# Patient Record
Sex: FEMALE | Race: BLACK OR AFRICAN AMERICAN | Employment: OTHER | URBAN - METROPOLITAN AREA
[De-identification: names, ages, dates, MRNs, and addresses within clinical notes are randomized per-mention and may not be internally consistent; named-entity substitution may affect disease eponyms.]

---

## 2017-11-15 ENCOUNTER — HOSPITAL ENCOUNTER (OUTPATIENT)
Dept: MAMMOGRAPHY | Age: 67
Discharge: HOME | End: 2017-11-15
Attending: FAMILY MEDICINE
Payer: COMMERCIAL

## 2017-11-15 PROCEDURE — 77067 SCR MAMMO BI INCL CAD: CPT

## 2017-11-17 ENCOUNTER — HOSPITAL ENCOUNTER (OUTPATIENT)
Dept: MAMMOGRAPHY | Age: 67
Discharge: HOME | End: 2017-11-17
Attending: FAMILY MEDICINE
Payer: COMMERCIAL

## 2017-11-17 DIAGNOSIS — N63.10 BREAST MASS, RIGHT: ICD-10-CM

## 2017-11-17 PROCEDURE — 76642 ULTRASOUND BREAST LIMITED: CPT

## 2017-11-20 NOTE — PROGRESS NOTES
sched MRI bilateral with contrast : 1. 2.4 cm x 1 cm x 1.8 cm hypoechoic abnormality at the level of a prior scar  which may represent scar tissue although should be further characterized given  that this does appear to have demonstrated evolution by mammography. This would  be best performed with a contrasted bilateral breast MRI. I am going to try to order; might need PA? ??

## 2017-11-28 ENCOUNTER — HOSPITAL ENCOUNTER (OUTPATIENT)
Dept: MRI IMAGING | Age: 67
Discharge: HOME | End: 2017-11-28
Attending: FAMILY MEDICINE
Payer: COMMERCIAL

## 2017-11-28 VITALS — WEIGHT: 163 LBS | BODY MASS INDEX: 30.8 KG/M2

## 2017-11-28 DIAGNOSIS — R92.8 ABNORMAL ULTRASOUND OF BREAST: ICD-10-CM

## 2017-11-28 DIAGNOSIS — N63.10 BREAST MASS, RIGHT: ICD-10-CM

## 2017-11-28 DIAGNOSIS — Z85.3 PERSONAL HISTORY OF BREAST CANCER: ICD-10-CM

## 2017-11-28 LAB — CREAT BLD-MCNC: 1.2 MG/DL (ref 0.8–1.5)

## 2017-11-28 PROCEDURE — 82565 ASSAY OF CREATININE: CPT

## 2017-11-28 PROCEDURE — 74011250636 HC RX REV CODE- 250/636: Performed by: FAMILY MEDICINE

## 2017-11-28 PROCEDURE — A9577 INJ MULTIHANCE: HCPCS | Performed by: FAMILY MEDICINE

## 2017-11-28 PROCEDURE — 77059 MRI BREAST BI W WO CONT: CPT

## 2017-11-28 PROCEDURE — 74011000258 HC RX REV CODE- 258: Performed by: FAMILY MEDICINE

## 2017-11-28 RX ORDER — SODIUM CHLORIDE 0.9 % (FLUSH) 0.9 %
10 SYRINGE (ML) INJECTION
Status: COMPLETED | OUTPATIENT
Start: 2017-11-28 | End: 2017-11-28

## 2017-11-28 RX ADMIN — GADOBENATE DIMEGLUMINE 15 ML: 529 INJECTION, SOLUTION INTRAVENOUS at 10:07

## 2017-11-28 RX ADMIN — SODIUM CHLORIDE 100 ML: 900 INJECTION, SOLUTION INTRAVENOUS at 10:07

## 2017-11-28 RX ADMIN — Medication 10 ML: at 10:07

## 2017-12-05 ENCOUNTER — HOSPITAL ENCOUNTER (OUTPATIENT)
Dept: MAMMOGRAPHY | Age: 67
Discharge: HOME | End: 2017-12-05
Attending: FAMILY MEDICINE
Payer: COMMERCIAL

## 2017-12-05 VITALS — SYSTOLIC BLOOD PRESSURE: 110 MMHG | DIASTOLIC BLOOD PRESSURE: 73 MMHG | TEMPERATURE: 97.2 F | HEART RATE: 94 BPM

## 2017-12-05 DIAGNOSIS — R92.2 BREAST DENSITY: ICD-10-CM

## 2017-12-05 DIAGNOSIS — R92.8 OTHER ABNORMAL AND INCONCLUSIVE FINDINGS ON DIAGNOSTIC IMAGING OF BREAST: ICD-10-CM

## 2017-12-05 DIAGNOSIS — N63.10 BREAST MASS, RIGHT: ICD-10-CM

## 2017-12-05 DIAGNOSIS — Z85.3 HISTORY OF BREAST CANCER: ICD-10-CM

## 2017-12-05 PROCEDURE — 77065 DX MAMMO INCL CAD UNI: CPT

## 2017-12-05 PROCEDURE — 19083 BX BREAST 1ST LESION US IMAG: CPT

## 2017-12-05 PROCEDURE — 74011000250 HC RX REV CODE- 250: Performed by: FAMILY MEDICINE

## 2017-12-05 PROCEDURE — 88305 TISSUE EXAM BY PATHOLOGIST: CPT | Performed by: FAMILY MEDICINE

## 2017-12-05 RX ORDER — LIDOCAINE HYDROCHLORIDE 10 MG/ML
8 INJECTION INFILTRATION; PERINEURAL
Status: COMPLETED | OUTPATIENT
Start: 2017-12-05 | End: 2017-12-05

## 2017-12-05 RX ADMIN — LIDOCAINE HYDROCHLORIDE 8 ML: 10 INJECTION, SOLUTION INFILTRATION; PERINEURAL at 09:36

## 2017-12-06 NOTE — PROGRESS NOTES
I gave the patient results, and informally discussed over the phone with Dr. Sandra Barcenas, please schedule an appointment within the next 1-2 weeks with Dr. Won Cruz at the breast center for breast cancer local recurrence considering surgical options

## 2017-12-06 NOTE — PROGRESS NOTES
I spoke with Aman Walsh @ Dr Ean Miranda office regarding the results of Ms Gi Deshpande breast U/S bx. Pathology: Rt IDC with lobular features. Dr Ean Miranda gave her the results and she will not come in for results.  She has decided to go to a surgeon @ [facility] and will be followed up by Dr Ean Miranda

## 2018-02-08 RX ORDER — CEFAZOLIN SODIUM IN 0.9 % NACL 2 G/50 ML
2 INTRAVENOUS SOLUTION, PIGGYBACK (ML) INTRAVENOUS ONCE
Status: CANCELLED | OUTPATIENT
Start: 2018-02-14 | End: 2018-02-14

## 2018-02-15 ENCOUNTER — HOSPITAL ENCOUNTER (OUTPATIENT)
Dept: SURGERY | Age: 68
Discharge: HOME | End: 2018-02-15
Payer: COMMERCIAL

## 2018-02-15 VITALS
DIASTOLIC BLOOD PRESSURE: 82 MMHG | HEIGHT: 62 IN | SYSTOLIC BLOOD PRESSURE: 149 MMHG | HEART RATE: 72 BPM | WEIGHT: 167.19 LBS | BODY MASS INDEX: 30.77 KG/M2 | TEMPERATURE: 97.8 F | RESPIRATION RATE: 20 BRPM | OXYGEN SATURATION: 100 %

## 2018-02-15 LAB
ANION GAP SERPL CALC-SCNC: 5 MMOL/L (ref 7–16)
APPEARANCE UR: ABNORMAL
APTT PPP: 29.5 SEC (ref 23.2–35.3)
ATRIAL RATE: 70 BPM
BACTERIA URNS QL MICRO: ABNORMAL /HPF
BILIRUB UR QL: NEGATIVE
BUN SERPL-MCNC: 11 MG/DL (ref 8–23)
CALCIUM SERPL-MCNC: 10 MG/DL (ref 8.3–10.4)
CALCULATED P AXIS, ECG09: 67 DEGREES
CALCULATED R AXIS, ECG10: 52 DEGREES
CALCULATED T AXIS, ECG11: 32 DEGREES
CASTS URNS QL MICRO: ABNORMAL /LPF
CHLORIDE SERPL-SCNC: 107 MMOL/L (ref 98–107)
CO2 SERPL-SCNC: 30 MMOL/L (ref 21–32)
COLOR UR: YELLOW
CREAT SERPL-MCNC: 1 MG/DL (ref 0.6–1)
DIAGNOSIS, 93000: NORMAL
EPI CELLS #/AREA URNS HPF: ABNORMAL /HPF
ERYTHROCYTE [DISTWIDTH] IN BLOOD BY AUTOMATED COUNT: 15.3 % (ref 11.9–14.6)
GLUCOSE BLD STRIP.AUTO-MCNC: 84 MG/DL (ref 65–100)
GLUCOSE SERPL-MCNC: 83 MG/DL (ref 65–100)
GLUCOSE UR STRIP.AUTO-MCNC: NEGATIVE MG/DL
HCT VFR BLD AUTO: 37.7 % (ref 35.8–46.3)
HGB BLD-MCNC: 12 G/DL (ref 11.7–15.4)
HGB UR QL STRIP: ABNORMAL
INR PPP: 1
KETONES UR QL STRIP.AUTO: NEGATIVE MG/DL
LEUKOCYTE ESTERASE UR QL STRIP.AUTO: NEGATIVE
MCH RBC QN AUTO: 24.7 PG (ref 26.1–32.9)
MCHC RBC AUTO-ENTMCNC: 31.8 G/DL (ref 31.4–35)
MCV RBC AUTO: 77.7 FL (ref 79.6–97.8)
NITRITE UR QL STRIP.AUTO: POSITIVE
P-R INTERVAL, ECG05: 138 MS
PH UR STRIP: 5.5 [PH] (ref 5–9)
PLATELET # BLD AUTO: 265 K/UL (ref 150–450)
PMV BLD AUTO: 9.2 FL (ref 10.8–14.1)
POTASSIUM SERPL-SCNC: 4 MMOL/L (ref 3.5–5.1)
PROT UR STRIP-MCNC: NEGATIVE MG/DL
PROTHROMBIN TIME: 13.2 SEC (ref 11.5–14.5)
Q-T INTERVAL, ECG07: 376 MS
QRS DURATION, ECG06: 78 MS
QTC CALCULATION (BEZET), ECG08: 406 MS
RBC # BLD AUTO: 4.85 M/UL (ref 4.05–5.25)
RBC #/AREA URNS HPF: ABNORMAL /HPF
SODIUM SERPL-SCNC: 142 MMOL/L (ref 136–145)
SP GR UR REFRACTOMETRY: 1.01 (ref 1–1.02)
UROBILINOGEN UR QL STRIP.AUTO: 0.2 EU/DL (ref 0.2–1)
VENTRICULAR RATE, ECG03: 70 BPM
WBC # BLD AUTO: 6.2 K/UL (ref 4.3–11.1)
WBC URNS QL MICRO: ABNORMAL /HPF

## 2018-02-15 PROCEDURE — 80048 BASIC METABOLIC PNL TOTAL CA: CPT | Performed by: SURGERY

## 2018-02-15 PROCEDURE — 85610 PROTHROMBIN TIME: CPT | Performed by: SURGERY

## 2018-02-15 PROCEDURE — 85027 COMPLETE CBC AUTOMATED: CPT | Performed by: SURGERY

## 2018-02-15 PROCEDURE — 93005 ELECTROCARDIOGRAM TRACING: CPT | Performed by: ANESTHESIOLOGY

## 2018-02-15 PROCEDURE — 81001 URINALYSIS AUTO W/SCOPE: CPT | Performed by: SURGERY

## 2018-02-15 PROCEDURE — 82962 GLUCOSE BLOOD TEST: CPT

## 2018-02-15 PROCEDURE — 85730 THROMBOPLASTIN TIME PARTIAL: CPT | Performed by: SURGERY

## 2018-02-15 NOTE — PERIOP NOTES
POC glucose 84 . Instructed  Patient that if blood sugar 300 or > , surgery may be cancelled. Patient verified name, , and surgery as listed in [facility]. Patient provided medical/health information and PTA medications to the best of their ability. TYPE  CASE:3  Orders per surgeon: Received  and dated 18. Labs per surgeon:cbc, bmp, pt/ptt, urine. Results: pending  Labs per anesthesia protocol: type and screen on the dos. EKG  :  Today per protocol. To desk for review along with old ekg  Per Dr. Nereyda Norwood pt did not need seen per anesthesia today. Patient provided with and instructed on education handouts including Guide to Surgery, blood transfusions, pain management, and hand hygiene for the family and community, and [place] [id number] [facility] brochure.     hibiclens and instructions given per hospital policy. Instructed patient to continue previous medications as prescribed prior to surgery unless otherwise directed and to take the following medications the day of surgery according to anesthesia guidelines : amlodipine, flonase if needed, bystolic, effexor . Instructed patient to hold  the following medications: aspirin, calcium chew. Original medication prescription bottles  not visualized during patient appointment. Patient teach back successful and patient demonstrates knowledge of instruction.

## 2018-02-15 NOTE — PERIOP NOTES
Recent Results (from the past 12 hour(s))   CBC W/O DIFF    Collection Time: 02/15/18 10:59 AM   Result Value Ref Range    WBC 6.2 4.3 - 11.1 K/uL    RBC 4.85 4.05 - 5.25 M/uL    HGB 12.0 11.7 - 15.4 g/dL    HCT 37.7 35.8 - 46.3 %    MCV 77.7 (L) 79.6 - 97.8 FL    MCH 24.7 (L) 26.1 - 32.9 PG    MCHC 31.8 31.4 - 35.0 g/dL    RDW 15.3 (H) 11.9 - 14.6 %    PLATELET 136 440 - 429 K/uL    MPV 9.2 (L) 10.8 - 84.0 FL   METABOLIC PANEL, BASIC    Collection Time: 02/15/18 10:59 AM   Result Value Ref Range    Sodium 142 136 - 145 mmol/L    Potassium 4.0 3.5 - 5.1 mmol/L    Chloride 107 98 - 107 mmol/L    CO2 30 21 - 32 mmol/L    Anion gap 5 (L) 7 - 16 mmol/L    Glucose 83 65 - 100 mg/dL    BUN 11 8 - 23 MG/DL    Creatinine 1.00 0.6 - 1.0 MG/DL    GFR est AA >60 >60 ml/min/1.73m2    GFR est non-AA 59 (L) >60 ml/min/1.73m2    Calcium 10.0 8.3 - 10.4 MG/DL   PROTHROMBIN TIME + INR    Collection Time: 02/15/18 10:59 AM   Result Value Ref Range    Prothrombin time 13.2 11.5 - 14.5 sec    INR 1.0     PTT    Collection Time: 02/15/18 10:59 AM   Result Value Ref Range    aPTT 29.5 23.2 - 35.3 SEC   URINALYSIS W/ RFLX MICROSCOPIC    Collection Time: 02/15/18 10:59 AM   Result Value Ref Range    Color YELLOW      Appearance CLOUDY      Specific gravity 1.013 1.001 - 1.023      pH (UA) 5.5 5.0 - 9.0      Protein NEGATIVE  NEG mg/dL    Glucose NEGATIVE  mg/dL    Ketone NEGATIVE  NEG mg/dL    Bilirubin NEGATIVE  NEG      Blood TRACE (A) NEG      Urobilinogen 0.2 0.2 - 1.0 EU/dL    Nitrites POSITIVE (A) NEG      Leukocyte Esterase NEGATIVE  NEG      WBC 3-5 0 /hpf    RBC 0-3 0 /hpf    Epithelial cells 0-3 0 /hpf    Bacteria 4+ (H) 0 /hpf    Casts 0-3 0 /lpf   GLUCOSE, POC    Collection Time: 02/15/18 11:14 AM   Result Value Ref Range    Glucose (POC) 84 65 - 100 mg/dL

## 2018-02-15 NOTE — PERIOP NOTES
Notified Jaswant Lott at office that pt has + nitrates and 4+ bacteria in urine, she states she will notify Dr. Kelsey Stanley.

## 2018-02-21 ENCOUNTER — ANESTHESIA (OUTPATIENT)
Dept: SURGERY | Age: 68
DRG: 581 | End: 2018-02-21
Payer: COMMERCIAL

## 2018-02-21 ENCOUNTER — APPOINTMENT (OUTPATIENT)
Dept: NUCLEAR MEDICINE | Age: 68
DRG: 581 | End: 2018-02-21
Attending: SURGERY
Payer: COMMERCIAL

## 2018-02-21 ENCOUNTER — ANESTHESIA EVENT (OUTPATIENT)
Dept: SURGERY | Age: 68
DRG: 581 | End: 2018-02-21
Payer: COMMERCIAL

## 2018-02-21 ENCOUNTER — HOSPITAL ENCOUNTER (INPATIENT)
Age: 68
LOS: 2 days | Discharge: HOME | DRG: 581 | End: 2018-02-23
Attending: SURGERY | Admitting: SURGERY
Payer: COMMERCIAL

## 2018-02-21 DIAGNOSIS — C50.911 MALIGNANT NEOPLASM OF RIGHT FEMALE BREAST, UNSPECIFIED ESTROGEN RECEPTOR STATUS, UNSPECIFIED SITE OF BREAST (HCC): ICD-10-CM

## 2018-02-21 DIAGNOSIS — G89.18 POST-OP PAIN: Primary | ICD-10-CM

## 2018-02-21 PROBLEM — C50.919 BREAST CANCER IN FEMALE (HCC): Status: ACTIVE | Noted: 2018-02-21

## 2018-02-21 LAB
ABO + RH BLD: NORMAL
BLOOD GROUP ANTIBODIES SERPL: NORMAL
GLUCOSE BLD STRIP.AUTO-MCNC: 91 MG/DL (ref 65–100)
SPECIMEN EXP DATE BLD: NORMAL

## 2018-02-21 PROCEDURE — 74011250636 HC RX REV CODE- 250/636: Performed by: ANESTHESIOLOGY

## 2018-02-21 PROCEDURE — 74011250636 HC RX REV CODE- 250/636

## 2018-02-21 PROCEDURE — 88307 TISSUE EXAM BY PATHOLOGIST: CPT | Performed by: SURGERY

## 2018-02-21 PROCEDURE — 77030012406 HC DRN WND PENRS BARD -A: Performed by: SURGERY

## 2018-02-21 PROCEDURE — 0KBH0ZZ EXCISION OF RIGHT THORAX MUSCLE, OPEN APPROACH: ICD-10-PCS | Performed by: SURGERY

## 2018-02-21 PROCEDURE — A9541 TC99M SULFUR COLLOID: HCPCS

## 2018-02-21 PROCEDURE — 77030002996 HC SUT SLK J&J -A: Performed by: SURGERY

## 2018-02-21 PROCEDURE — 0KXF0Z5 TRANSFER RIGHT TRUNK MUSCLE, LATISSIMUS DORSI MYOCUTANEOUS FLAP, OPEN APPROACH: ICD-10-PCS | Performed by: SURGERY

## 2018-02-21 PROCEDURE — 74011250636 HC RX REV CODE- 250/636: Performed by: SURGERY

## 2018-02-21 PROCEDURE — 76210000006 HC OR PH I REC 0.5 TO 1 HR: Performed by: SURGERY

## 2018-02-21 PROCEDURE — 0HHT0NZ INSERTION OF TISSUE EXPANDER INTO RIGHT BREAST, OPEN APPROACH: ICD-10-PCS | Performed by: SURGERY

## 2018-02-21 PROCEDURE — 76060000042 HC ANESTHESIA 5.5 TO 6 HR: Performed by: SURGERY

## 2018-02-21 PROCEDURE — 74011000250 HC RX REV CODE- 250

## 2018-02-21 PROCEDURE — 65270000029 HC RM PRIVATE

## 2018-02-21 PROCEDURE — C1789 PROSTHESIS, BREAST, IMP: HCPCS | Performed by: SURGERY

## 2018-02-21 PROCEDURE — 82962 GLUCOSE BLOOD TEST: CPT

## 2018-02-21 PROCEDURE — 77030008477 HC STYL SATN SLP COVD -A: Performed by: NURSE ANESTHETIST, CERTIFIED REGISTERED

## 2018-02-21 PROCEDURE — 77030011283 HC ELECTRD NDL COVD -A: Performed by: SURGERY

## 2018-02-21 PROCEDURE — 77030031139 HC SUT VCRL2 J&J -A: Performed by: SURGERY

## 2018-02-21 PROCEDURE — 77030011640 HC PAD GRND REM COVD -A: Performed by: SURGERY

## 2018-02-21 PROCEDURE — 07B50ZX EXCISION OF RIGHT AXILLARY LYMPHATIC, OPEN APPROACH, DIAGNOSTIC: ICD-10-PCS | Performed by: SURGERY

## 2018-02-21 PROCEDURE — 77030027138 HC INCENT SPIROMETER -A

## 2018-02-21 PROCEDURE — 0HTT0ZZ RESECTION OF RIGHT BREAST, OPEN APPROACH: ICD-10-PCS | Performed by: SURGERY

## 2018-02-21 PROCEDURE — 77030032490 HC SLV COMPR SCD KNE COVD -B: Performed by: SURGERY

## 2018-02-21 PROCEDURE — 77030013674 HC FIL EXPND TISS ALGN -A: Performed by: SURGERY

## 2018-02-21 PROCEDURE — 77030033138 HC SUT PGA STRATFX J&J -B: Performed by: SURGERY

## 2018-02-21 PROCEDURE — 77030016570 HC BLNKT BAIR HGGR 3M -B: Performed by: NURSE ANESTHETIST, CERTIFIED REGISTERED

## 2018-02-21 PROCEDURE — 86900 BLOOD TYPING SEROLOGIC ABO: CPT | Performed by: ANESTHESIOLOGY

## 2018-02-21 PROCEDURE — 77030008703 HC TU ET UNCUF COVD -A: Performed by: NURSE ANESTHETIST, CERTIFIED REGISTERED

## 2018-02-21 PROCEDURE — 76010000178 HC OR TIME 5.5 TO 6 HR INTENSV-TIER 1: Performed by: SURGERY

## 2018-02-21 PROCEDURE — 74011000250 HC RX REV CODE- 250: Performed by: SURGERY

## 2018-02-21 PROCEDURE — 74011250637 HC RX REV CODE- 250/637: Performed by: SURGERY

## 2018-02-21 PROCEDURE — 77030019908 HC STETH ESOPH SIMS -A: Performed by: NURSE ANESTHETIST, CERTIFIED REGISTERED

## 2018-02-21 PROCEDURE — 77030008467 HC STPLR SKN COVD -B: Performed by: SURGERY

## 2018-02-21 PROCEDURE — 99218 HC RM OBSERVATION: CPT

## 2018-02-21 PROCEDURE — 88305 TISSUE EXAM BY PATHOLOGIST: CPT | Performed by: SURGERY

## 2018-02-21 PROCEDURE — 77030018836 HC SOL IRR NACL ICUM -A: Performed by: SURGERY

## 2018-02-21 PROCEDURE — 77030013567 HC DRN WND RESERV BARD -A: Performed by: SURGERY

## 2018-02-21 DEVICE — TEXTURED, HIGH PROFILE, SUTURE TABS, INTEGRAL INJECTION DOME, 475CC
Type: IMPLANTABLE DEVICE | Site: BREAST | Status: FUNCTIONAL
Brand: ARTOURA BREAST TISSUE EXPANDER

## 2018-02-21 RX ORDER — GLYCOPYRROLATE 0.2 MG/ML
INJECTION INTRAMUSCULAR; INTRAVENOUS AS NEEDED
Status: DISCONTINUED | OUTPATIENT
Start: 2018-02-21 | End: 2018-02-21 | Stop reason: HOSPADM

## 2018-02-21 RX ORDER — SODIUM CHLORIDE 0.9 % (FLUSH) 0.9 %
5-10 SYRINGE (ML) INJECTION EVERY 8 HOURS
Status: DISCONTINUED | OUTPATIENT
Start: 2018-02-21 | End: 2018-02-23 | Stop reason: HOSPADM

## 2018-02-21 RX ORDER — ROCURONIUM BROMIDE 10 MG/ML
INJECTION, SOLUTION INTRAVENOUS AS NEEDED
Status: DISCONTINUED | OUTPATIENT
Start: 2018-02-21 | End: 2018-02-21 | Stop reason: HOSPADM

## 2018-02-21 RX ORDER — ACETAMINOPHEN 325 MG/1
650 TABLET ORAL
Status: DISCONTINUED | OUTPATIENT
Start: 2018-02-21 | End: 2018-02-23 | Stop reason: HOSPADM

## 2018-02-21 RX ORDER — EPHEDRINE SULFATE 50 MG/ML
INJECTION, SOLUTION INTRAVENOUS AS NEEDED
Status: DISCONTINUED | OUTPATIENT
Start: 2018-02-21 | End: 2018-02-21 | Stop reason: HOSPADM

## 2018-02-21 RX ORDER — BUPIVACAINE HYDROCHLORIDE 5 MG/ML
INJECTION, SOLUTION EPIDURAL; INTRACAUDAL AS NEEDED
Status: DISCONTINUED | OUTPATIENT
Start: 2018-02-21 | End: 2018-02-21 | Stop reason: HOSPADM

## 2018-02-21 RX ORDER — LIDOCAINE HYDROCHLORIDE 20 MG/ML
INJECTION, SOLUTION EPIDURAL; INFILTRATION; INTRACAUDAL; PERINEURAL AS NEEDED
Status: DISCONTINUED | OUTPATIENT
Start: 2018-02-21 | End: 2018-02-21 | Stop reason: HOSPADM

## 2018-02-21 RX ORDER — ONDANSETRON 2 MG/ML
INJECTION INTRAMUSCULAR; INTRAVENOUS AS NEEDED
Status: DISCONTINUED | OUTPATIENT
Start: 2018-02-21 | End: 2018-02-21 | Stop reason: HOSPADM

## 2018-02-21 RX ORDER — SODIUM CHLORIDE 0.9 % (FLUSH) 0.9 %
5-10 SYRINGE (ML) INJECTION AS NEEDED
Status: DISCONTINUED | OUTPATIENT
Start: 2018-02-21 | End: 2018-02-21 | Stop reason: HOSPADM

## 2018-02-21 RX ORDER — CEFAZOLIN SODIUM 1 G/3ML
INJECTION, POWDER, FOR SOLUTION INTRAMUSCULAR; INTRAVENOUS AS NEEDED
Status: DISCONTINUED | OUTPATIENT
Start: 2018-02-21 | End: 2018-02-21 | Stop reason: HOSPADM

## 2018-02-21 RX ORDER — OXYCODONE HYDROCHLORIDE 5 MG/1
5 TABLET ORAL
Status: DISCONTINUED | OUTPATIENT
Start: 2018-02-21 | End: 2018-02-21 | Stop reason: HOSPADM

## 2018-02-21 RX ORDER — SODIUM CHLORIDE 0.9 % (FLUSH) 0.9 %
5-10 SYRINGE (ML) INJECTION AS NEEDED
Status: DISCONTINUED | OUTPATIENT
Start: 2018-02-21 | End: 2018-02-23 | Stop reason: HOSPADM

## 2018-02-21 RX ORDER — AMLODIPINE BESYLATE 10 MG/1
10 TABLET ORAL DAILY
Status: DISCONTINUED | OUTPATIENT
Start: 2018-02-22 | End: 2018-02-23 | Stop reason: HOSPADM

## 2018-02-21 RX ORDER — DIPHENHYDRAMINE HCL 25 MG
25 CAPSULE ORAL
Status: DISCONTINUED | OUTPATIENT
Start: 2018-02-21 | End: 2018-02-23 | Stop reason: HOSPADM

## 2018-02-21 RX ORDER — VENLAFAXINE HYDROCHLORIDE 37.5 MG/1
37.5 CAPSULE, EXTENDED RELEASE ORAL
Status: DISCONTINUED | OUTPATIENT
Start: 2018-02-22 | End: 2018-02-23 | Stop reason: HOSPADM

## 2018-02-21 RX ORDER — DOCUSATE SODIUM 100 MG/1
100 CAPSULE, LIQUID FILLED ORAL 2 TIMES DAILY
Status: DISCONTINUED | OUTPATIENT
Start: 2018-02-21 | End: 2018-02-23 | Stop reason: HOSPADM

## 2018-02-21 RX ORDER — SUCCINYLCHOLINE CHLORIDE 20 MG/ML
INJECTION INTRAMUSCULAR; INTRAVENOUS AS NEEDED
Status: DISCONTINUED | OUTPATIENT
Start: 2018-02-21 | End: 2018-02-21 | Stop reason: HOSPADM

## 2018-02-21 RX ORDER — CEFAZOLIN SODIUM IN 0.9 % NACL 2 G/50 ML
2 INTRAVENOUS SOLUTION, PIGGYBACK (ML) INTRAVENOUS ONCE
Status: DISCONTINUED | OUTPATIENT
Start: 2018-02-21 | End: 2018-02-21 | Stop reason: SDUPTHER

## 2018-02-21 RX ORDER — FENTANYL CITRATE 50 UG/ML
INJECTION, SOLUTION INTRAMUSCULAR; INTRAVENOUS AS NEEDED
Status: DISCONTINUED | OUTPATIENT
Start: 2018-02-21 | End: 2018-02-21 | Stop reason: HOSPADM

## 2018-02-21 RX ORDER — NEOSTIGMINE METHYLSULFATE 1 MG/ML
INJECTION INTRAVENOUS AS NEEDED
Status: DISCONTINUED | OUTPATIENT
Start: 2018-02-21 | End: 2018-02-21 | Stop reason: HOSPADM

## 2018-02-21 RX ORDER — PROPOFOL 10 MG/ML
INJECTION, EMULSION INTRAVENOUS AS NEEDED
Status: DISCONTINUED | OUTPATIENT
Start: 2018-02-21 | End: 2018-02-21 | Stop reason: HOSPADM

## 2018-02-21 RX ORDER — OXYCODONE AND ACETAMINOPHEN 5; 325 MG/1; MG/1
1 TABLET ORAL
Status: DISCONTINUED | OUTPATIENT
Start: 2018-02-21 | End: 2018-02-23 | Stop reason: HOSPADM

## 2018-02-21 RX ORDER — DEXAMETHASONE SODIUM PHOSPHATE 4 MG/ML
INJECTION, SOLUTION INTRA-ARTICULAR; INTRALESIONAL; INTRAMUSCULAR; INTRAVENOUS; SOFT TISSUE AS NEEDED
Status: DISCONTINUED | OUTPATIENT
Start: 2018-02-21 | End: 2018-02-21 | Stop reason: HOSPADM

## 2018-02-21 RX ORDER — ONDANSETRON 8 MG/1
4 TABLET, ORALLY DISINTEGRATING ORAL
Status: DISCONTINUED | OUTPATIENT
Start: 2018-02-21 | End: 2018-02-23 | Stop reason: HOSPADM

## 2018-02-21 RX ORDER — OXYCODONE AND ACETAMINOPHEN 10; 325 MG/1; MG/1
1 TABLET ORAL AS NEEDED
Status: DISCONTINUED | OUTPATIENT
Start: 2018-02-21 | End: 2018-02-21 | Stop reason: HOSPADM

## 2018-02-21 RX ORDER — HYDROMORPHONE HYDROCHLORIDE 2 MG/ML
0.5 INJECTION, SOLUTION INTRAMUSCULAR; INTRAVENOUS; SUBCUTANEOUS
Status: DISCONTINUED | OUTPATIENT
Start: 2018-02-21 | End: 2018-02-23 | Stop reason: HOSPADM

## 2018-02-21 RX ORDER — MIDAZOLAM HYDROCHLORIDE 1 MG/ML
2 INJECTION, SOLUTION INTRAMUSCULAR; INTRAVENOUS
Status: DISCONTINUED | OUTPATIENT
Start: 2018-02-21 | End: 2018-02-21 | Stop reason: HOSPADM

## 2018-02-21 RX ORDER — SODIUM CHLORIDE, SODIUM LACTATE, POTASSIUM CHLORIDE, CALCIUM CHLORIDE 600; 310; 30; 20 MG/100ML; MG/100ML; MG/100ML; MG/100ML
75 INJECTION, SOLUTION INTRAVENOUS CONTINUOUS
Status: DISCONTINUED | OUTPATIENT
Start: 2018-02-21 | End: 2018-02-21 | Stop reason: HOSPADM

## 2018-02-21 RX ORDER — CEFAZOLIN SODIUM/WATER 2 G/20 ML
2 SYRINGE (ML) INTRAVENOUS ONCE
Status: COMPLETED | OUTPATIENT
Start: 2018-02-21 | End: 2018-02-21

## 2018-02-21 RX ORDER — ACETAMINOPHEN 10 MG/ML
INJECTION, SOLUTION INTRAVENOUS AS NEEDED
Status: DISCONTINUED | OUTPATIENT
Start: 2018-02-21 | End: 2018-02-21 | Stop reason: HOSPADM

## 2018-02-21 RX ORDER — HYDROMORPHONE HYDROCHLORIDE 2 MG/ML
0.5 INJECTION, SOLUTION INTRAMUSCULAR; INTRAVENOUS; SUBCUTANEOUS
Status: DISCONTINUED | OUTPATIENT
Start: 2018-02-21 | End: 2018-02-21 | Stop reason: HOSPADM

## 2018-02-21 RX ORDER — LIDOCAINE HYDROCHLORIDE AND EPINEPHRINE 10; 10 MG/ML; UG/ML
INJECTION, SOLUTION INFILTRATION; PERINEURAL AS NEEDED
Status: DISCONTINUED | OUTPATIENT
Start: 2018-02-21 | End: 2018-02-21 | Stop reason: HOSPADM

## 2018-02-21 RX ADMIN — EPHEDRINE SULFATE 5 MG: 50 INJECTION, SOLUTION INTRAVENOUS at 13:32

## 2018-02-21 RX ADMIN — ACETAMINOPHEN 1000 MG: 10 INJECTION, SOLUTION INTRAVENOUS at 17:30

## 2018-02-21 RX ADMIN — EPHEDRINE SULFATE 10 MG: 50 INJECTION, SOLUTION INTRAVENOUS at 13:54

## 2018-02-21 RX ADMIN — OXYCODONE HYDROCHLORIDE AND ACETAMINOPHEN 1 TABLET: 5; 325 TABLET ORAL at 21:44

## 2018-02-21 RX ADMIN — Medication 2 G: at 12:57

## 2018-02-21 RX ADMIN — FENTANYL CITRATE 25 MCG: 50 INJECTION, SOLUTION INTRAMUSCULAR; INTRAVENOUS at 14:45

## 2018-02-21 RX ADMIN — DOCUSATE SODIUM 100 MG: 100 CAPSULE, LIQUID FILLED ORAL at 21:44

## 2018-02-21 RX ADMIN — EPHEDRINE SULFATE 5 MG: 50 INJECTION, SOLUTION INTRAVENOUS at 13:14

## 2018-02-21 RX ADMIN — EPHEDRINE SULFATE 5 MG: 50 INJECTION, SOLUTION INTRAVENOUS at 13:06

## 2018-02-21 RX ADMIN — ROCURONIUM BROMIDE 10 MG: 10 INJECTION, SOLUTION INTRAVENOUS at 16:29

## 2018-02-21 RX ADMIN — EPHEDRINE SULFATE 5 MG: 50 INJECTION, SOLUTION INTRAVENOUS at 13:08

## 2018-02-21 RX ADMIN — EPHEDRINE SULFATE 5 MG: 50 INJECTION, SOLUTION INTRAVENOUS at 13:47

## 2018-02-21 RX ADMIN — FENTANYL CITRATE 25 MCG: 50 INJECTION, SOLUTION INTRAMUSCULAR; INTRAVENOUS at 16:07

## 2018-02-21 RX ADMIN — DEXAMETHASONE SODIUM PHOSPHATE 4 MG: 4 INJECTION, SOLUTION INTRA-ARTICULAR; INTRALESIONAL; INTRAMUSCULAR; INTRAVENOUS; SOFT TISSUE at 13:10

## 2018-02-21 RX ADMIN — FENTANYL CITRATE 50 MCG: 50 INJECTION, SOLUTION INTRAMUSCULAR; INTRAVENOUS at 13:02

## 2018-02-21 RX ADMIN — FENTANYL CITRATE 25 MCG: 50 INJECTION, SOLUTION INTRAMUSCULAR; INTRAVENOUS at 15:30

## 2018-02-21 RX ADMIN — HYDROMORPHONE HYDROCHLORIDE 0.5 MG: 2 INJECTION, SOLUTION INTRAMUSCULAR; INTRAVENOUS; SUBCUTANEOUS at 18:49

## 2018-02-21 RX ADMIN — ROCURONIUM BROMIDE 10 MG: 10 INJECTION, SOLUTION INTRAVENOUS at 15:08

## 2018-02-21 RX ADMIN — Medication 10 ML: at 21:46

## 2018-02-21 RX ADMIN — EPHEDRINE SULFATE 10 MG: 50 INJECTION, SOLUTION INTRAVENOUS at 13:52

## 2018-02-21 RX ADMIN — SODIUM CHLORIDE, SODIUM LACTATE, POTASSIUM CHLORIDE, AND CALCIUM CHLORIDE 75 ML/HR: 600; 310; 30; 20 INJECTION, SOLUTION INTRAVENOUS at 09:16

## 2018-02-21 RX ADMIN — ONDANSETRON 4 MG: 2 INJECTION INTRAMUSCULAR; INTRAVENOUS at 18:25

## 2018-02-21 RX ADMIN — GLYCOPYRROLATE 0.4 MG: 0.2 INJECTION INTRAMUSCULAR; INTRAVENOUS at 18:00

## 2018-02-21 RX ADMIN — PROPOFOL 170 MG: 10 INJECTION, EMULSION INTRAVENOUS at 12:52

## 2018-02-21 RX ADMIN — ROCURONIUM BROMIDE 20 MG: 10 INJECTION, SOLUTION INTRAVENOUS at 14:21

## 2018-02-21 RX ADMIN — NEOSTIGMINE METHYLSULFATE 3 MG: 1 INJECTION INTRAVENOUS at 18:00

## 2018-02-21 RX ADMIN — SUCCINYLCHOLINE CHLORIDE 120 MG: 20 INJECTION INTRAMUSCULAR; INTRAVENOUS at 12:52

## 2018-02-21 RX ADMIN — ROCURONIUM BROMIDE 5 MG: 10 INJECTION, SOLUTION INTRAVENOUS at 12:52

## 2018-02-21 RX ADMIN — SODIUM CHLORIDE, SODIUM LACTATE, POTASSIUM CHLORIDE, AND CALCIUM CHLORIDE: 600; 310; 30; 20 INJECTION, SOLUTION INTRAVENOUS at 13:50

## 2018-02-21 RX ADMIN — ROCURONIUM BROMIDE 10 MG: 10 INJECTION, SOLUTION INTRAVENOUS at 15:57

## 2018-02-21 RX ADMIN — LIDOCAINE HYDROCHLORIDE 100 MG: 20 INJECTION, SOLUTION EPIDURAL; INFILTRATION; INTRACAUDAL; PERINEURAL at 12:52

## 2018-02-21 RX ADMIN — FENTANYL CITRATE 100 MCG: 50 INJECTION, SOLUTION INTRAMUSCULAR; INTRAVENOUS at 12:52

## 2018-02-21 RX ADMIN — CEFAZOLIN SODIUM 2 G: 1 INJECTION, POWDER, FOR SOLUTION INTRAMUSCULAR; INTRAVENOUS at 16:46

## 2018-02-21 RX ADMIN — FENTANYL CITRATE 50 MCG: 50 INJECTION, SOLUTION INTRAMUSCULAR; INTRAVENOUS at 16:38

## 2018-02-21 RX ADMIN — SODIUM CHLORIDE, SODIUM LACTATE, POTASSIUM CHLORIDE, AND CALCIUM CHLORIDE: 600; 310; 30; 20 INJECTION, SOLUTION INTRAVENOUS at 17:40

## 2018-02-21 RX ADMIN — FENTANYL CITRATE 25 MCG: 50 INJECTION, SOLUTION INTRAMUSCULAR; INTRAVENOUS at 13:43

## 2018-02-21 RX ADMIN — MIDAZOLAM HYDROCHLORIDE 2 MG: 1 INJECTION, SOLUTION INTRAMUSCULAR; INTRAVENOUS at 11:41

## 2018-02-21 NOTE — H&P
CC: Right breast cancer  HPI: 76 y/o s/p previous lumpectomy and radation with a new right breast cancer. Plans for mastectomy, SLN, LDMCF reconstruction. No issues. Off anticoagulation. Has had her lymphoscintigraphy performed. Past Medical History:   Diagnosis Date    Abnormal results of thyroid function studies     Anemia     Anxiety     BPV (benign positional vertigo)     [facility] 1999    right---radiation and 5 years of tamoxifen--lumpectomy    Breast cancer ([facility] 2018    right    Chronic depression     Chronic tension headache     Diabetes mellitus type II, controlled ([facility]     saxenda- bs: does not check blood sugars.  Elevated glucose     Encounter for long-term (current) use of high-risk medication     Encounter for long-term (current) use of medications     Fatigue     Heart palpitations     Hematuria     Hot flashes, menopausal     Hyperlipidemia     Hypertension, benign     Hyperthyroidism     Insomnia     Menopausal disorder     Microcytosis     Obesity      Past Surgical History:   Procedure Laterality Date    HX BREAST BIOPSY  1999    HX BREAST LUMPECTOMY  1999    HX CATARACT REMOVAL Left 10/2017     with IOL    HX CATARACT REMOVAL Right 11/2017    with IOL    HX PARTIAL HYSTERECTOMY      still w/ ovaries     A&O x3  RRR  Resp clear  Marked in upright position. Right inframammory scar. Minimal radiation damage. A/P:  Will porceed with immediate flap reconstruction.

## 2018-02-21 NOTE — H&P
[facility]  [address] [place] [address]  [phone]      Patient:  Rohit Hutchison  : 1950     HPI  Rohit Hutchison is a 78 y.o. female who presents to the office today to discuss surgical options for right breast mass. She underwent routine mammography and was found to have right breast mass. Patient states that she had right breast cancer in  in which she underwent right lumpectomy with radiation and 5 years of tamoxifen. She has healed well from this has a scar low almost in the inframammary fold. Patient states she has no pain to the breast and denies any nipple discharge. No redness or tenderness noted. States she does self breast exams and can not feel any changes. Denies weight loss or fatigue. Denies smoking or use of alcohol. Core Bx completed on 2017 infiltrating duct carcinoma. ER/CO positive and Her-2/mai negative. Denies family history of cancer.             Past Medical History:   Diagnosis Date    Abnormal results of thyroid function studies      Anemia      Anxiety      BPV (benign positional vertigo)      Breast cancer (HCC)     Chronic depression      Chronic tension headache      Diabetes mellitus type II, controlled (HCC)      Elevated glucose      Encounter for long-term (current) use of high-risk medication      Encounter for long-term (current) use of medications      Fatigue      Heart palpitations      Hematuria      Hot flashes, menopausal      Hyperlipidemia      Hypertension, benign      Hyperthyroidism      Insomnia      Menopausal disorder      Microcytosis      Obesity               Current Outpatient Prescriptions   Medication Sig Dispense Refill    nebivolol (BYSTOLIC) 5 mg tablet Take 1 Tab by mouth daily. Indications: hypertension 90 Tab 3    ALPRAZolam (XANAX) 0.5 mg tablet Take 1 Tab by mouth nightly.  Max Daily Amount: 0.5 mg. 1/2 to 1 for insomnia 90 Tab 1    venlafaxine [facility]) 75 mg tablet Take 1 Tab by mouth two (2) times a day. 180 Tab 3    spironolactone (ALDACTONE) 25 mg tablet Take 1 Tab by mouth daily. 90 Tab 3    liraglutide (SAXENDA) 3 mg/0.5 mL (18 mg/3 mL) pen 0.5 mL by SubCUTAneous route daily. 15 Pen 3    Insulin Needles, Disposable, (ARIANA PEN NEEDLE) 32 gauge x 5/32\" ndle 1 Pen Needle by Does Not Apply route daily. 90 Pen Needle 3    amLODIPine (NORVASC) 5 mg tablet Take 1 Tab by mouth daily. 90 Tab 3    butalbital-acetaminophen-caffeine (FIORICET, ESGIC) -40 mg per tablet Take 1-2 Tabs by mouth four (4) times daily as needed for Pain. Max Daily Amount: 8 Tabs. 30 Tab 1    fluticasone (FLONASE) 50 mcg/actuation nasal spray 2 Sprays by Both Nostrils route daily. 1 Bottle 0    aspirin 81 mg chewable tablet Take 81 mg by mouth daily.        Calcium-Vitamin D3-Vitamin K (VIACTIV) 922-990-32 mg-unit-mcg chew Take 1 Tab by mouth two (2) times a day.        methylPREDNISolone (MEDROL DOSEPACK) 4 mg tablet Take as directed on the pack.  1 Dose Pack 0            Allergies   Allergen Reactions    Ambien [Zolpidem] Other (comments)       Makes her dream    Buspar [Buspirone] Vertigo    Iron Other (comments)       constipation    Macrodantin [Nitrofurantoin Macrocrystalline] Unknown (comments)    Milk Other (comments)       Causes stomach problems            Past Surgical History:   Procedure Laterality Date    HX BREAST BIOPSY   1999    HX BREAST LUMPECTOMY   1999    HX CATARACT REMOVAL Left 10/2017     dr Mauricio Bigger eye    HX CATARACT REMOVAL Right       Dr Haroldo Zuñiga; planned for 11/16/2017    HX PARTIAL HYSTERECTOMY         still w/ ovaries            Family History   Problem Relation Age of Onset    Uterine Cancer Mother      Hypertension Mother      Stroke Father      Heart Disease Maternal Grandmother             Social History   Substance Use Topics    Smoking status: Never Smoker    Smokeless tobacco: Never Used    Alcohol use No         Review of Systems   A comprehensive review of systems was negative except for that written in the HPI.     Physical Exam       Visit Vitals    /72    Pulse 83    Ht 5' 1\" (1.549 m)    Wt 164 lb (74.4 kg)    SpO2 98%    BMI 30.99 kg/m2         General:                    Alert, oriented, cooperative, awake patient in no acute distress   Skin:                                    Warm, moist with good texture   Eyes:                                   Sclera are clear, extraocular muscles intact  HENT:                                 Normocephalic; oral mucosa moist, nares patent; neck is supple; trachea midline  Respiratory:               Lungs clear to auscultation bilaterally, breathing is non-labored   Chest:                                  Symmetric throughout one respiratory excursion; no supraclavicular lymphadenopathy, no axillary lymphadenopathy noted, right breast soft, non-tender, a little firmness noted near old scar, no masses palpated  CV:                                      Regular rate and rhythm, no appreciable murmurs, rubs, gallops  Abdomen:                  Soft, protuberant but non-distended; bowel sounds are normoactive   Extremities:               No cyanosis, clubbing or edema  Neurological:             No focal signs        Labs: No results found for: CMP, APTT, PTP, INR      Mammo reviewed     MRI reviewed     Pathology reviewed     Assessment/Plan:   Zoran Barrera is a 78 y.o. female who has signs and symptoms consistent with right breast cancer. Spoke with patient regarding need for mastectomy since she preciously had radiation. Explained the need for sentinel lymph node bx and offered her the option of breast reconstruction with a plastic surgeon simultaneously. Patient verbalized understanding and wants to proceed with mastectomy, sentinel lymph node bx.     1. Refer to plastic surgeon for reconstruction options  2.  Schedule Mastectomy with sentinel lymph node bx.with simultaneous reconstruction with plastics.     Celia Soriano NP      I have seen and examined the patient with the Nurse Practitioner and agree with the above assessment and plan.      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 Plan right mastectomy with SLN biopsy with immediately reconstruction. Will take some chest wall to ensure margin.      No new c/o, ready to proceed as above.   Rory Helms MD

## 2018-02-21 NOTE — ANESTHESIA PREPROCEDURE EVALUATION
Anesthetic History   No history of anesthetic complications            Review of Systems / Medical History  Patient summary reviewed and pertinent labs reviewed    Pulmonary  Within defined limits                 Neuro/Psych         Psychiatric history     Cardiovascular    Hypertension              Exercise tolerance: >4 METS     GI/Hepatic/Renal  Within defined limits              Endo/Other    Diabetes: type 2  Hypothyroidism: well controlled  Morbid obesity     Other Findings              Physical Exam    Airway  Mallampati: II  TM Distance: > 6 cm  Neck ROM: normal range of motion   Mouth opening: Normal     Cardiovascular    Rhythm: regular           Dental  No notable dental hx       Pulmonary                 Abdominal  GI exam deferred       Other Findings            Anesthetic Plan    ASA: 3  Anesthesia type: general          Induction: Intravenous  Anesthetic plan and risks discussed with: Patient

## 2018-02-21 NOTE — BRIEF OP NOTE
BRIEF OPERATIVE NOTE    Date of Procedure: 2/21/2018   Preoperative Diagnosis: Malignant neoplasm of right female breast, unspecified estrogen receptor status, unspecified site of breast ([facility] [C50.911]  Postoperative Diagnosis: female breast, unspecified estrogen receptor status, unspecified site of breast ([facility] Kai Thomas    Procedure(s):  RIGHT SENTINAL LYMPH NODE BIOPSY with mapping  BREAST MASTECTOMY SIMPLE RIGHT  BREAST RECONSTRUCTION RIGHT  Surgeon(s) and Role:  Panel 1:     * Chelly Garza MD - Primary    Panel 2:     * Madeleine Desir MD - Primary     * [address] [place] [address], [address] [place] [address]         Assistant Staff: Nurse Practitioner: Mednel Mcnulty NP      Surgical Staff:  Circ-1: Jose Landaverde RN  Circ-Relief: Leigh Ann Kelley RN; Yessy Wright RN; Raegan Santizo RN  Scrub Tech-1: Lugene Spies  Scrub Tech-2: Geraldo Fabi  Nurse Practitioner: Mendel Mcnulty NP  Event Time In   Incision Start  1:15 PM   Incision Close      Anesthesia: General   Estimated Blood Loss: 50 ml  Specimens:   ID Type Source Tests Collected by Time Destination   1 : right mastectomy Fresh Breast  Chelly Garza MD 2/21/2018  1:54 PM Pathology   2 : right sentinal lymph node #1 Preservative Mil Moseley MD 2/21/2018  2:04 PM Pathology   3 : right sentinal lymph node #2 Preservative Mil Moseley MD 2/21/2018  2:05 PM Pathology   4 : right non sentinal lymph node Preservative Mil Moseley MD 2/21/2018  2:07 PM Pathology   5 : right sentinal lymph node #3 Preservative Mil Moseley MD 2/21/2018  2:11 PM Pathology      Findings: 1 the palpable mass is identified close to chest wall, both pectoralis major muscle and fascia was taken at the tumor site, no gross invasion into the muscle; 2 successful SLN identification;   Complications: none  Implants: by Dr Kiki Saunders

## 2018-02-21 NOTE — IP AVS SNAPSHOT
Blanco White 
 
 
 145 [facility] 322 [facility] 
[phone] Patient: Antonio Gavin 
MRN: [id number] :1950 About your hospitalization You were admitted on:  2018 You last received care in the:  [facility] 2 SURGICAL You were discharged on:  2018 Why you were hospitalized Your primary diagnosis was:  Not on File Your diagnoses also included:  Breast Cancer In Female (Hcc) Follow-up Information Follow up With Details Comments Contact Info Ami Harris MD   [address] [address] 
[phone] Miryam Lin MD On 3/1/2018 1:00 pm  [place] [place] [address] 
[phone] Your Scheduled Appointments 2018  9:00 AM EST Global Post Op with Claire Riley MD  
[facility] ([facility]) [address] [place] [address]  
[phone] Discharge Orders None A check amilcar indicates which time of day the medication should be taken. My Medications START taking these medications Instructions Each Dose to Equal  
 Morning Noon Evening Bedtime  
 cephALEXin 500 mg capsule Commonly known as:  Branda Elliott Take 1 Cap by mouth four (4) times daily. 500 mg  
    
  
   
  
   
  
   
  
  
 oxyCODONE-acetaminophen 5-325 mg per tablet Commonly known as:  PERCOCET Notes to Patient:  Take on as needed schedule Take 1 Tab by mouth every four (4) hours as needed. Max Daily Amount: 6 Tabs. 1 Tab CHANGE how you take these medications Instructions Each Dose to Equal  
 Morning Noon Evening Bedtime ALPRAZolam 0.5 mg tablet Commonly known as:  Bhavya Madera What changed:   
- when to take this 
- reasons to take this 
- additional instructions Take 1 Tab by mouth nightly. Max Daily Amount: 0.5 mg. 1/2 to 1 for insomnia  
 0.5 mg  
    
   
   
   
  
  
 fluticasone 50 mcg/actuation nasal spray Commonly known as:  Loren Paget What changed:   
- when to take this 
- reasons to take this 2 Sprays by Both Nostrils route daily. 2 Spray  
    
  
   
   
   
  
 liraglutide 3 mg/0.5 mL (18 mg/3 mL) pen Commonly known as:  Shayla Joiner What changed:  when to take this  
   
 0.5 mL by SubCUTAneous route daily. 3 mg CONTINUE taking these medications Instructions Each Dose to Equal  
 Morning Noon Evening Bedtime  
 amLODIPine 5 mg tablet Commonly known as:  [place] Soles Take 1 Tab by mouth daily. 5 mg  
    
  
   
   
   
  
 aspirin 81 mg chewable tablet Take 81 mg by mouth daily. Indications: myocardial infarction prevention, am- hold until after surgery 81 mg  
    
  
   
   
   
  
 butalbital-acetaminophen-caffeine -40 mg per tablet Commonly known as:  Carlos Eduardo Dire Notes to Patient:  Take on as needed schedule Take 1-2 Tabs by mouth four (4) times daily as needed for Pain. Max Daily Amount: 8 Tabs. 1-2 Tab Insulin Needles (Disposable) 32 gauge x 5/32\" Ndle Commonly known as:  Shannan Pen Needle 1 Pen Needle by Does Not Apply route daily. 1 Pen Needle  
    
  
   
   
   
  
 nebivolol 5 mg tablet Commonly known as:  BYSTOLIC Take 1 Tab by mouth daily. Indications: hypertension 5 mg  
    
  
   
   
   
  
 spironolactone 25 mg tablet Commonly known as:  ALDACTONE Take 1 Tab by mouth daily. 25 mg  
    
  
   
   
   
  
 venlafaxine 75 mg tablet Commonly known as:  [facility] Take 1 Tab by mouth two (2) times a day. 75 mg  
    
   
   
   
  
 VIACTIV [phone] mg-unit-mcg Chew Generic drug:  Calcium-Vitamin D3-Vitamin K Take 1 Tab by mouth two (2) times a day.  Indications: HYPOCALCEMIA PREVENTION, hold until after surgery 1 Tab Where to Get Your Medications Information on where to get these meds will be given to you by the nurse or doctor. ! Ask your nurse or doctor about these medications  
  cephALEXin 500 mg capsule  
 oxyCODONE-acetaminophen 5-325 mg per tablet Discharge Instructions Keep breast dressing in place and dry until return to clinic. Avoid any lifting more than 5 lbs Do not lift arms above shoulders Thursday 3/1/2018 [facility] 1:00 PM  
   
 
 
 
 
DISCHARGE SUMMARY from Nurse PATIENT INSTRUCTIONS: 
 
After general anesthesia or intravenous sedation, for 24 hours or while taking prescription Narcotics: · Limit your activities · Do not drive and operate hazardous machinery · Do not make important personal or business decisions · Do  not drink alcoholic beverages · If you have not urinated within 8 hours after discharge, please contact your surgeon on call. Report the following to your surgeon: 
· Excessive pain, swelling, redness or odor of or around the surgical area · Temperature over 100.5 · Nausea and vomiting lasting longer than 4 hours or if unable to take medications · Any signs of decreased circulation or nerve impairment to extremity: change in color, persistent  numbness, tingling, coldness or increase pain · Any questions What to do at Home: 
Recommended activity: Activity as tolerated, per MD instructions, No heavy lifting If you experience any of the following symptoms fever > 100.5, nausea, vomiting, pain, chest pain and/or shortness of breath please follow up with MD. 
 
*  Please give a list of your current medications to your Primary Care Provider. *  Please update this list whenever your medications are discontinued, doses are 
    changed, or new medications (including over-the-counter products) are added. *  Please carry medication information at all times in case of emergency situations. These are general instructions for a healthy lifestyle: No smoking/ No tobacco products/ Avoid exposure to second hand smoke Surgeon General's Warning:  Quitting smoking now greatly reduces serious risk to your health. Obesity, smoking, and sedentary lifestyle greatly increases your risk for illness A healthy diet, regular physical exercise & weight monitoring are important for maintaining a healthy lifestyle You may be retaining fluid if you have a history of heart failure or if you experience any of the following symptoms:  Weight gain of 3 pounds or more overnight or 5 pounds in a week, increased swelling in our hands or feet or shortness of breath while lying flat in bed. Please call your doctor as soon as you notice any of these symptoms; do not wait until your next office visit. Recognize signs and symptoms of STROKE: 
 
F-face looks uneven A-arms unable to move or move unevenly S-speech slurred or non-existent T-time-call 911 as soon as signs and symptoms begin-DO NOT go Back to bed or wait to see if you get better-TIME IS BRAIN. Warning Signs of HEART ATTACK Call 911 if you have these symptoms: 
? Chest discomfort. Most heart attacks involve discomfort in the center of the chest that lasts more than a few minutes, or that goes away and comes back. It can feel like uncomfortable pressure, squeezing, fullness, or pain. ? Discomfort in other areas of the upper body. Symptoms can include pain or discomfort in one or both arms, the back, neck, jaw, or stomach. ? Shortness of breath with or without chest discomfort. ? Other signs may include breaking out in a cold sweat, nausea, or lightheadedness. Don't wait more than five minutes to call [address]! Fast action can save your life.  Calling 911 is almost always the fastest way to get lifesaving treatment. Emergency Medical Services staff can begin treatment when they arrive  up to an hour sooner than if someone gets to the hospital by car. The discharge information has been reviewed with the patient. The patient verbalized understanding. Discharge medications reviewed with the patient and appropriate educational materials and side effects teaching were provided. ___________________________________________________________________________________________________________________________________ Mastectomy: What to Expect at [facility] Your Recovery Right after the surgery you will probably feel weak, and you may feel sore for 2 to 3 days. You may have a pulling or stretching sensation near or under your arm. You may also have itching, tingling, and throbbing in the area. This will get better in a few days. You will probably have a plastic or rubber tube, called a drain, to collect fluid from under the affected arm on the side of the surgery. Your doctor will remove this when the fluid buildup slows. This can be in a few days or even several weeks. You may be able to go back to your normal routine or return to work in several weeks, but it may take longer. How long it takes you to recover will depend on the type of surgery you had. It also depends on whether you had breast reconstruction at the same time, or if you need other treatment. Your doctor or nurse will be able to give you an idea of what you can expect. When you find out that you have cancer, you may feel many emotions and may need some help coping. This is common. Seek out family, friends, and counselors for support. You also can do things at home to make yourself feel better while you go through treatment. Call the [employer] (3-543.483.8553) or visit its website at [address] org for more information.  
This care sheet gives you a general idea about how long it will take for you to recover. But each person recovers at a different pace. Follow the steps below to get better as quickly as possible. How can you care for yourself at home? Activity ? · Rest when you feel tired. Getting enough sleep will help you recover. After any activity, rest and raise your affected arm for a period of time equal to your activity time. ? · Try to walk each day. Start by walking a little more than you did the day before. Bit by bit, increase the amount you walk. Walking boosts blood flow and helps prevent pneumonia and constipation. ? · Avoid strenuous activities, such as biking, jogging, weightlifting, or aerobic exercise, until your doctor says it is okay. This includes housework, especially if you have to use your affected arm. You will probably be able to do your normal activities in 3 to 6 weeks. Avoid repeated motions with your affected arm, such as weed pulling, window cleaning, or vacuuming, for 6 months. ? · Avoid lifting anything over 10 to 15 pounds for 4 to 6 weeks. This may include a child, grocery bags, a heavy briefcase or backpack, cat litter or dog food bags, or a vacuum . ? · Ask your doctor when you can drive again. ? · You will probably be able to go back to work or your normal routine in 3 to 6 weeks. This depends on the type of work you do and any further treatment. ? · Your doctor will let you know how soon you can take showers or baths. Diet ? · You can eat your normal diet. If your stomach is upset, try bland, low-fat foods like plain rice, broiled chicken, toast, and yogurt. ? · Drink plenty of fluids (unless your doctor tells you not to). ? · You may notice that your bowels are not regular right after your surgery. This is common. Try to avoid constipation and straining with bowel movements. Take a fiber supplement such as Citrucel or Metamucil every day.  If you have not had a bowel movement after a couple of days, take a mild laxative like Milk of Magnesia or a stool softener like Colace. Medicines ? · Your doctor will tell you if and when you can restart your medicines. He or she will also give you instructions about taking any new medicines. ? · If you take blood thinners, such as warfarin (Coumadin), clopidogrel (Plavix), or aspirin, be sure to talk to your doctor. He or she will tell you if and when to start taking those medicines again. Make sure that you understand exactly what your doctor wants you to do. ? · Take pain medicines exactly as directed. ¨ If the doctor gave you a prescription medicine for pain, take it as prescribed. ¨ If you are not taking a prescription pain medicine, ask your doctor if you can take an over-the-counter medicine. ? · If your doctor prescribed antibiotics, take them as directed. Do not stop taking them just because you feel better. You need to take the full course of antibiotics. ? · If you think your pain medicine is making you sick to your stomach: 
¨ Take your medicine after meals (unless your doctor has told you not to). ¨ Ask your doctor for a different pain medicine. Incision care ? · You will have a dressing over the cut (incision). A dressing helps the incision heal and protects it. Your doctor will tell you how to take care of this. ? · You may be wearing a special bra (surgi-bra) that holds your dressing in place after the surgery. Your doctor will tell you when you can stop wearing the bra. ?Arm exercises ? · If you had any lymph nodes removed from under your arm, your doctor will advise you to do arm exercises. Do not do the exercises until your doctor says it is okay. Ice and elevation ? · Do not use ice for swelling or pain. ? · Prop up your arm on a pillow when you sit or lie down. Try to keep your arm above the level of your heart. This will help reduce swelling. Other instructions ? · You may have one or more drains in your surgery site. Your doctor will tell you how to take care of them. Follow-up care is a key part of your treatment and safety. Be sure to make and go to all appointments, and call your doctor if you are having problems. It's also a good idea to know your test results and keep a list of the medicines you take. When should you call for help? Call 911 anytime you think you may need emergency care. For example, call if: 
? · You passed out (lost consciousness). ? · You have chest pain, are short of breath, or cough up blood. ?Call your doctor now or seek immediate medical care if: 
? · You are sick to your stomach or cannot drink fluids. ? · You cannot pass stools or gas. ? · You have pain that does not get better after you take your pain medicine. ? · You have loose stitches, or your incision comes open. ? · Bright red blood has soaked through the bandage over your incision. ? · You have signs of a blood clot in your leg (called a deep vein thrombosis), such as: 
¨ Pain in your calf, back of the knee, thigh, or groin. ¨ Redness or swelling in your leg. ? · You have signs of infection, such as: 
¨ Increased pain, swelling, warmth, or redness. ¨ Red streaks leading from the incision. ¨ Pus draining from the incision. ¨ A fever. ? Watch closely for changes in your health, and be sure to contact your doctor if: 
? · You have any problems. ? · You have new or worse swelling or pain in your arm. Where can you learn more? Go to http://shivam-ml.info/. Enter P853 in the search box to learn more about \"Mastectomy: What to Expect at Home. \" Current as of: May 12, 2017 Content Version: 11.4 © 8740-9562 Healthwise, Incorporated. Care instructions adapted under license by [employer] (which disclaims liability or warranty for this information).  If you have questions about a medical condition or this instruction, always ask your healthcare professional. [address] any warranty or liability for your use of this information. ACO Transitions of Care Introducing [employer] [address] Malgorzata Zabala offers a voluntary care coordination program to provide high quality service and care to [facility] fee-for-service beneficiaries. Salena Chase was designed to help you enhance your health and well-being through the following services: ? Transitions of Care  support for individuals who are transitioning from one care setting to another (example: Hospital to home). ? Chronic and Complex Care Coordination  support for individuals and caregivers of those with serious or chronic illnesses or with more than one chronic (ongoing) condition and those who take a number of different medications. If you meet specific medical criteria, a [address] [place] [address] Rd may call you directly to coordinate your care with your primary care physician and your other care providers. For questions about the [facility] programs, please, contact your physicians office. For general questions or additional information about Accountable Care Organizations: 
Please visit www.medicare.gov/acos. html or call 1-800-MEDICARE (6-178.821.2499) TTY users should call 7-798.221.9501. [employer] Announcement We are excited to announce that we are making your provider's discharge notes available to you in SquareClockhart. You will see these notes when they are completed and signed by the physician that discharged you from your recent hospital stay. If you have any questions or concerns about any information you see in SquareClockhart, please call the Health Information Department where you were seen or reach out to your Primary Care Provider for more information about your plan of care. Introducing [facility]! [facility] introduces hopTo patient portal. Now you can access parts of your medical record, email your doctor's office, and request medication refills online. 1. In your internet browser, go to [email or web address] 2. Click on the First Time User? Click Here link in the Sign In box. You will see the New Member Sign Up page. 3. Enter your hopTo Access Code exactly as it appears below. You will not need to use this code after youve completed the sign-up process. If you do not sign up before the expiration date, you must request a new code. · hopTo Access Code: [email or web address] Expires: 4/30/2018  8:10 AM 
 
4. Enter the last four digits of your Social Security Number (xxxx) and Date of Birth (mm/dd/yyyy) as indicated and click Submit. You will be taken to the next sign-up page. 5. Create a hopTo ID. This will be your hopTo login ID and cannot be changed, so think of one that is secure and easy to remember. 6. Create a hopTo password. You can change your password at any time. 7. Enter your Password Reset Question and Answer. This can be used at a later time if you forget your password. 8. Enter your e-mail address. You will receive e-mail notification when new information is available in [facility] 9. Click Sign Up. You can now view and download portions of your medical record. 10. Click the Download Summary menu link to download a portable copy of your medical information. If you have questions, please visit the Frequently Asked Questions section of the hopTo website. Remember, hopTo is NOT to be used for urgent needs. For medical emergencies, dial 911. Now available from your iPhone and Android! Providers Seen During Your Hospitalization Provider Specialty Primary office phone Tracey Sepulveda MD General Surgery [phone] Maricarmen Quiroga MD Plastic Surgery [phone] Your Primary Care Physician (PCP) Primary Care Physician Office Phone Office Fax [address] [facility], [address] [phone] You are allergic to the following Allergen Reactions Ambien (Zolpidem) Other (comments) Makes her dream  
    
 Buspar (Buspirone) Vertigo Iron Other (comments)  
 constipation Macrodantin (Nitrofurantoin Macrocrystalline) Unknown (comments) Milk Other (comments) Causes stomach problems Recent Documentation Height Weight BMI OB Status Smoking Status 1.562 m 74.6 kg 30.56 kg/m2 Hysterectomy Never Smoker Emergency Contacts Name Discharge Info Relation Home Work Birdie Velez  Child [2] [phone] Concepcion Loya  Other Relative [6] [phone] Patient Belongings The following personal items are in your possession at time of discharge: 
  Dental Appliances: None  Visual Aid: None      Home Medications: None   Jewelry: None  Clothing: Socks, Undergarments, Footwear, Pants, Shirt    Other Valuables: None Please provide this summary of care documentation to your next provider. Signatures-by signing, you are acknowledging that this After Visit Summary has been reviewed with you and you have received a copy. Patient Signature:  ____________________________________________________________ Date:  ____________________________________________________________  
  
Devota Danjonny Provider Signature:  ____________________________________________________________ Date:  ____________________________________________________________

## 2018-02-22 PROCEDURE — 99218 HC RM OBSERVATION: CPT

## 2018-02-22 PROCEDURE — 74011250636 HC RX REV CODE- 250/636: Performed by: SURGERY

## 2018-02-22 PROCEDURE — 65270000029 HC RM PRIVATE

## 2018-02-22 PROCEDURE — 74011250637 HC RX REV CODE- 250/637: Performed by: SURGERY

## 2018-02-22 RX ORDER — ENOXAPARIN SODIUM 100 MG/ML
40 INJECTION SUBCUTANEOUS EVERY 24 HOURS
Status: DISCONTINUED | OUTPATIENT
Start: 2018-02-22 | End: 2018-02-23 | Stop reason: HOSPADM

## 2018-02-22 RX ORDER — CEPHALEXIN 500 MG/1
500 CAPSULE ORAL 4 TIMES DAILY
Status: DISCONTINUED | OUTPATIENT
Start: 2018-02-22 | End: 2018-02-23 | Stop reason: HOSPADM

## 2018-02-22 RX ADMIN — OXYCODONE HYDROCHLORIDE AND ACETAMINOPHEN 1 TABLET: 5; 325 TABLET ORAL at 15:52

## 2018-02-22 RX ADMIN — VENLAFAXINE HYDROCHLORIDE 37.5 MG: 37.5 CAPSULE, EXTENDED RELEASE ORAL at 08:32

## 2018-02-22 RX ADMIN — CEPHALEXIN 500 MG: 500 CAPSULE ORAL at 17:58

## 2018-02-22 RX ADMIN — DOCUSATE SODIUM 100 MG: 100 CAPSULE, LIQUID FILLED ORAL at 17:58

## 2018-02-22 RX ADMIN — ENOXAPARIN SODIUM 40 MG: 40 INJECTION SUBCUTANEOUS at 10:27

## 2018-02-22 RX ADMIN — CEPHALEXIN 500 MG: 500 CAPSULE ORAL at 21:12

## 2018-02-22 RX ADMIN — Medication 10 ML: at 14:30

## 2018-02-22 RX ADMIN — HYDROMORPHONE HYDROCHLORIDE 0.5 MG: 2 INJECTION, SOLUTION INTRAMUSCULAR; INTRAVENOUS; SUBCUTANEOUS at 05:14

## 2018-02-22 RX ADMIN — Medication 10 ML: at 06:16

## 2018-02-22 RX ADMIN — OXYCODONE HYDROCHLORIDE AND ACETAMINOPHEN 1 TABLET: 5; 325 TABLET ORAL at 21:12

## 2018-02-22 RX ADMIN — OXYCODONE HYDROCHLORIDE AND ACETAMINOPHEN 1 TABLET: 5; 325 TABLET ORAL at 01:32

## 2018-02-22 RX ADMIN — CEPHALEXIN 500 MG: 500 CAPSULE ORAL at 14:29

## 2018-02-22 RX ADMIN — CEPHALEXIN 500 MG: 500 CAPSULE ORAL at 10:27

## 2018-02-22 RX ADMIN — DOCUSATE SODIUM 100 MG: 100 CAPSULE, LIQUID FILLED ORAL at 08:32

## 2018-02-22 NOTE — OP NOTES
OPERATIVE NOTE    Date of Procedure: 2/21/2018   Preoperative Diagnosis: Right breast invasive ductal carcinoma and previous radiation  Postoperative Diagnosis: Same    Procedure(s):  Right breast first stage reconstruction with latissimus dorsi myocutaneous flap    Surgeon(s) and Role:  Panel 1:     * Jens Kate MD - Primary    Panel 2:     * Rahat Cheng MD - Primary     * Baldo davis V, [address] - Assisting         Prior to the procedure the patient was met in holding. Informed consent was reviewed, risks including but not limited to infection, bleeding, failure of the procedure, damage to tissues, hematoma, seroma, poor scar formation, loss of function, asymmetry, loss of tissue, and need for further procedure. The patient expressed understanding and desire to proceed. With the patient in a relaxed straight standing position, marks were made for breast reconstruction marking anterior landmarks at the midline and inframammary folds as well as the existing transverse chest scar followed by posterior marks along the borders of the latissimus muscle, PSIS, scapular border and the projected skin paddle. Patient taken to the OR and Dr Hoda Flynn performed the ablative portion of the procedure. Following this portion of the procedure the anterior incision was temporarily closed with staples and covered with ioban. Patient repositioned left lateral with appropriate padding. Patient reprepped including the entire right arm over a sterile padded dye stand. Attention was then turned to the back where a lenticular skin paddle was marked in position at a projected central location over the latissimus muscle with an arc of rotation to allow for a tension-free comfortable inset after 90- degree rotation of the muscle and a transverse scar. The skin was incised sharply and the dissection beveled away from the central portion of the skin paddle down to and through the superficial fascia.   The skin flaps were elevated surrounding the latissimus muscle and the borders of the muscle identified. Dissection proceeded on the deep surface of the muscle at the superior edge. Muscle released at each border. Care was taken to preserve the trapezius muscle, the teres major, and the serratus anterior. Vascular pedicle identified and preserved. The vascular branch to the serratus was identified and intact. The nerve to latissimus muscle was dissected away from the vascular pedicle and a 2.5- to 3-cm segment was resected. The muscle was dissected as distally as possible toward its tendinous insertion. A pocket was made for transfer of the flap through the axilla to the chest. 10 flat jessica drains placed at the back and secured with 2-0 silk. Patient noted to have adhesion and limited tissue elasticity throughout the axilla. Dissection tedious throughout the irradiated tissue. The flap was transposed into the anterior wound and rotated without any tension on the pedicle and with maintainance of excellent blood flow through the pedicle to the skin paddle as monitored by doppler. The posterior wound was irrigated copiously and hemostasis was confirmed. The back was closed in a layered fashion with 2-0 vicryl 3 point mattress sutures, 3-0 vicryl deep fascial interrupted, running and interrupted 3-0 deep dermals and running quill 3-0 monoderm quill, dressed with xeroform, gauze and Tegaderm. Attention then turned to the chest. Skin flaps evaluated and viable. Portion of the pec major had been resected along with the specimen. Base diameter measured and expander selected. This was filled to 120 mls. Expander secured to chest wall with 3-0 vicryls. The latissimus muscle was then secured along its borders with 3-0 vicryls. The pocket was then irrigated copiously. Two drains were placed; 1 below and one above the muscle and in the axilla. These were secured with 2-0 silk.   Wound then closed with 3-0 vicryl deep fascial interrupted, running and interrupted 3-0 deep dermals and running quill 3-0 monoderm quill. Formal breast dressing applied. Surgical Staff:  Circ-1: Irma Velazquez RN  Circ-Relief: Carly Rodriguez RN; Daniel Montemayor RN; Miranda Garay RN  Scrub Tech-1: Simeon Acosta  Scrub Tech-2: Traci Herndon  Nurse Practitioner: Thaddeus Kennedy NP  Event Time In   Incision Start 1315   Incision Close 1820     Anesthesia: General   Estimated Blood Loss: 50 ml  Specimens:   ID Type Source Tests Collected by Time Destination   1 : right mastectomy Fresh Breast  Petros Tobias MD 2/21/2018 1354 Pathology   2 : right sentinal lymph node #1 Preservative Axilla  Petros Tobias MD 2/21/2018 1404 Pathology   3 : right sentinal lymph node #2 Preservative Axilla  Petros Tobias MD 2/21/2018 1405 Pathology   4 : right non sentinal lymph node Preservative Axilla  Petros Tobias MD 2/21/2018 1407 Pathology   5 : right sentinal lymph node #3 Preservative Meagan Ford MD 2/21/2018 1411 Pathology      Findings: Thin muscle coverage over right anterior chest   Complications: None immediate  Implants:   Implant Name Type Inv.  Item Serial No.  Lot No. LRB No. Used Action   EXPNDR BRST TISS HP [id number] --  - [id number]   EXPNDR BRST TISS HP [id number] --    MENTOR [id number] Right 1 Implanted

## 2018-02-22 NOTE — PROGRESS NOTES
No acute events overnight. Has been up and walking to restroom independently. Voided x 3. Tolerating diet. Pain still requiring some IV pain meds. A&O x3  RRR  Resp clear  Incisions c/d/i. Drains appropriate. Stripped. No clot. Flap pink and well perfused. 2 s cap refill. No seroma or hematoma. Continue drains, anticipate d/c with drains x3 drain teaching for caregiver. Limit lifting 5 lbs right arm. Do not lift arm above shoulder. Start DVT prophy - lvx  Wean IV pain meds.

## 2018-02-22 NOTE — ROUTINE PROCESS
END OF SHIFT NOTE:    INTAKE/OUTPUT  02/21 0701 - 02/22 0700  In: 6844 [P.O.:240; I.V.:2350]  Out: 8826 [XONTW:9708; Drains:160]  Voiding: YES  Catheter: NO  Drain:   Get-Kraft Drain 02/21/18 Right; Lower; Other (Comment) Chest (Active)   Site Assessment Clean, dry, & intact 2/22/2018  1:58 AM   Dressing Status Clean, dry, & intact 2/22/2018  1:58 AM   Status Patent; Charged;Draining;Suction (specify 2/22/2018  1:58 AM   Drainage Color Sanguinous 2/22/2018  1:58 AM   Output (ml) 45 ml 2/22/2018  3:07 AM       Get-Kraft Drain 02/21/18 Right;Other (Comment); Lower Chest (Active)   Site Assessment Clean, dry, & intact 2/22/2018  1:58 AM   Dressing Status Clean, dry, & intact 2/22/2018  1:58 AM   Status Patent; Charged;Draining;Suction (specify 2/22/2018  1:58 AM   Drainage Color Sanguinous 2/22/2018  1:58 AM   Output (ml) 25 ml 2/22/2018  3:07 AM       Get-Kraft Drain 02/21/18 Right; Lower Breast (Active)   Site Assessment Clean, dry, & intact 2/22/2018  1:58 AM   Dressing Status Clean, dry, & intact 2/22/2018  1:58 AM   Status Patent; Charged;Draining;Suction (specify 2/22/2018  1:58 AM   Drainage Color Sanguinous 2/22/2018  1:58 AM   Output (ml) 20 ml 2/22/2018  3:07 AM               Flatus: Patient does not have flatus present. Stool:  0 occurrences. Characteristics:       Emesis: 0 occurrences. Characteristics:        VITAL SIGNS  Patient Vitals for the past 12 hrs:   Temp Pulse Resp BP SpO2   02/22/18 0307 98.3 °F (36.8 °C) 88 16 122/73 97 %   02/21/18 2239 96.3 °F (35.7 °C) 89 16 128/75 95 %   02/21/18 2030 96.3 °F (35.7 °C) 88 14 127/77 99 %       Pain Assessment  Pain Intensity 1: 1 (02/22/18 0614)  Pain Location 1: Chest  Pain Intervention(s) 1: Medication (see MAR)  Patient Stated Pain Goal: 3    Ambulating  Yes    Shift report given to oncoming nurse at the bedside.     Clayburn Bumpers, LPN

## 2018-02-22 NOTE — PROGRESS NOTES
Problem: Falls - Risk of  Goal: *Absence of Falls  Document Alex Fall Risk and appropriate interventions in the flowsheet.   Outcome: Progressing Towards Goal  Fall Risk Interventions:  Mobility Interventions: Communicate number of staff needed for ambulation/transfer, Patient to call before getting OOB         Medication Interventions: Patient to call before getting OOB, Teach patient to arise slowly    Elimination Interventions: Call light in reach, Patient to call for help with toileting needs, Toilet paper/wipes in reach

## 2018-02-22 NOTE — ROUTINE PROCESS
TRANSFER - IN REPORT:    Verbal report received from Carmen Fu RN on Andra Kid  being received from PACU for routine post - op      Report consisted of patients Situation, Background, Assessment and   Recommendations(SBAR). Information from the following report(s) SBAR, OR Summary, Procedure Summary, Intake/Output and MAR was reviewed with the receiving nurse. Opportunity for questions and clarification was provided. Assessment completed upon patients arrival to unit and care assumed.

## 2018-02-22 NOTE — PROGRESS NOTES
General Surgery Progress Note    Plan  Orders per plastics  Will follow up with patient in office March 2, 2018    Subjective  Patient complains of pain \"level 3\", no other complaints.  Denies N/V    Objective  Dressings C/D/I, CARMEN drains maintained, AF, VSS    Assessment  S/P Right mastectomy with sentinel node with latissimus flap reconstruction per plastics    Celia Denis FNP-BC

## 2018-02-22 NOTE — OP NOTES
[facility] REPORT    [facility]  MR#: [id number]  : 1950  ACCOUNT #: [de-identified]   DATE OF SERVICE: 2018    SURGEON:  Binh Vieira MD     ASSISTANT:  Tatyana Ross NP    PREOPERATIVE DIAGNOSIS:  Right breast cancer. POSTOPERATIVE DIAGNOSIS:  Right breast cancer. NAME OF THE PROCEDURE:   1. Right breast mastectomy with a partial chest wall resection. 2.  Right sentinel lymph node biopsy with lymphatic mapping. ANESTHESIA: general    ESTIMATED BLOOD LOSS:  82UQ    COMPLICATIONS:  none    SPECIMENS REMOVED:  As above    IMPLANTS:      INDICATIONS:  This is a 40-year-old female who presented with a mass in her right breast that is biopsied to be confirmed to be invasive ductal carcinoma. As noted, she had a previous breast cancer in , which she had lumpectomy and radiation. She probably had lymph node treatment either by radiation, also sentinel lymph node biopsy at that time. The patient offered another surgery. Given her history of radiation, she offered mastectomy and also we discussed possible chest wall resection given the close proximity to the chest wall and the sentinel lymph node will be reattempted and she also wants reconstruction at the same time. She did see Dr. Paula Gleason and that this is scheduled as a combined surgery with Dr. Paula Gleason. Patient understood risks and benefits and agreed to proceed. FINDINGS:  1. The mass is palpable in the 6 to 7 o'clock position of her right breast, and is a marked close to the chest wall. We were able to take both pectoralis major muscle and the fascia below the mass and gross margins is clean and there is no evidence of muscle involvement. 2.  Successful sentinel lymph node biopsy. PROCEDURE:  After informed consent obtained, the patient is brought into the operating room.   As noted, the patient was marked by Dr. Paula Gleason prior to surgery, and then the patient was brought into the operating room, prepped and draped in the routine fashion and I did reconfirm some skin amilcar with Dr. Aleksandr George to include her previous lumpectomy scar. Then, the skin incision was made with a scalpel and based on the amilcar and the incision deepened into the dermis and then the skin flap was made. Superiorly, I made a slightly thicker flap given her history of radiation. The dissection carried down to the chest wall right below the clavicle, medially it was carried to the sternum and inferiorly I make a thinner flap and right down to the inframammary fold and then laterally I carried the dissection down to the anterior border of latissimus dorsi. I divided breast tail first and then the breast tissue was raised from the pectoralis major muscle. Superiorly, I left a little fascia and then as close to the tumor side, I took the fascia and some pectoralis major muscle fibers and then the breast was taken off the chest wall and the gross margin looks unremarkable to me. Then, we moved our focus to the axillary fossa. The axillary fossa was entered and then with a Neoprobe, a sentinel lymph node was identified. There were a total of 3 sentinel lymph nodes removed. They were all hot and also one nonsentinel lymph node were removed. After the sentinel lymph node biopsies, the activity in the axillary fossa dropped to less than 10% of the previous hottest lymph nodes. Then the surgery is inspected. Copious irrigation were used with sterile water and good hemostasis obtained and then the case was turned over to Dr. Aleksandr George for reconstruction. The patient had less than 50 mL blood loss on my part of the procedure. All instrument and equipment counts were correct. Candi Cleaning present, she stayed through the entire case.       Mary Scott MD       BY / RN  D: 02/21/2018 15:40     T: 02/21/2018 16:11  JOB #: [id number]

## 2018-02-22 NOTE — PERIOP NOTES
TRANSFER - OUT REPORT:    Verbal report given to [facility] RN on [employer]  being transferred to [facility] for routine post - op       Report consisted of patients Situation, Background, Assessment and   Recommendations(SBAR). Information from the following report(s) SBAR, OR Summary, Procedure Summary, Intake/Output and MAR was reviewed with the receiving nurse. Lines:   Peripheral IV 02/21/18 Left Hand (Active)   Site Assessment Clean, dry, & intact 2/21/2018  7:19 PM   Phlebitis Assessment 0 2/21/2018  7:19 PM   Infiltration Assessment 0 2/21/2018  7:19 PM   Dressing Status Clean, dry, & intact; Occlusive 2/21/2018  7:19 PM   Dressing Type Transparent;Tape 2/21/2018  7:19 PM   Hub Color/Line Status Pink; Infusing 2/21/2018  7:19 PM   Alcohol Cap Used No 2/21/2018  7:19 PM        Opportunity for questions and clarification was provided. Patient transported with:   O2 @ 3 liters    VTE prophylaxis orders have been written for [employer].    Patient and family given floor number and nurses name. Family updated re: pt status after security code verified.

## 2018-02-23 VITALS
RESPIRATION RATE: 16 BRPM | OXYGEN SATURATION: 98 % | HEIGHT: 62 IN | WEIGHT: 164.38 LBS | BODY MASS INDEX: 30.25 KG/M2 | TEMPERATURE: 97.9 F | HEART RATE: 91 BPM | DIASTOLIC BLOOD PRESSURE: 86 MMHG | SYSTOLIC BLOOD PRESSURE: 132 MMHG

## 2018-02-23 PROCEDURE — 74011250637 HC RX REV CODE- 250/637: Performed by: SURGERY

## 2018-02-23 PROCEDURE — 74011250636 HC RX REV CODE- 250/636: Performed by: SURGERY

## 2018-02-23 RX ORDER — OXYCODONE AND ACETAMINOPHEN 5; 325 MG/1; MG/1
1 TABLET ORAL
Qty: 60 TAB | Refills: 0 | Status: SHIPPED
Start: 2018-02-23 | End: 2018-03-30 | Stop reason: ALTCHOICE

## 2018-02-23 RX ORDER — CEPHALEXIN 500 MG/1
500 CAPSULE ORAL 4 TIMES DAILY
Qty: 40 CAP | Refills: 0 | Status: SHIPPED
Start: 2018-02-23 | End: 2018-03-05 | Stop reason: ALTCHOICE

## 2018-02-23 RX ADMIN — ENOXAPARIN SODIUM 40 MG: 40 INJECTION SUBCUTANEOUS at 08:08

## 2018-02-23 RX ADMIN — OXYCODONE HYDROCHLORIDE AND ACETAMINOPHEN 1 TABLET: 5; 325 TABLET ORAL at 01:26

## 2018-02-23 RX ADMIN — DOCUSATE SODIUM 100 MG: 100 CAPSULE, LIQUID FILLED ORAL at 08:08

## 2018-02-23 RX ADMIN — OXYCODONE HYDROCHLORIDE AND ACETAMINOPHEN 1 TABLET: 5; 325 TABLET ORAL at 06:33

## 2018-02-23 RX ADMIN — CEPHALEXIN 500 MG: 500 CAPSULE ORAL at 13:20

## 2018-02-23 RX ADMIN — VENLAFAXINE HYDROCHLORIDE 37.5 MG: 37.5 CAPSULE, EXTENDED RELEASE ORAL at 08:08

## 2018-02-23 RX ADMIN — CEPHALEXIN 500 MG: 500 CAPSULE ORAL at 08:08

## 2018-02-23 RX ADMIN — AMLODIPINE BESYLATE 10 MG: 10 TABLET ORAL at 08:08

## 2018-02-23 RX ADMIN — Medication 5 ML: at 01:26

## 2018-02-23 RX ADMIN — OXYCODONE HYDROCHLORIDE AND ACETAMINOPHEN 1 TABLET: 5; 325 TABLET ORAL at 13:21

## 2018-02-23 NOTE — DISCHARGE SUMMARY
Physician Discharge Summary       Patient: Valeria Hendricks MRN: [id number]  SSN: [id number]    YOB: 1950  Age: 75 y.o. Sex: female    PCP: Sindhu Rene MD    Allergies: Ambien [zolpidem]; Buspar [buspirone]; Iron; Macrodantin [nitrofurantoin macrocrystalline]; and Milk    Admit date: 2/21/2018  Admitting Provider: Inge Duncan MD    Discharge date: 2/23/2018  Discharging Provider: Inge Duncan MD    * Admission Diagnoses: Malignant neoplasm of right female breast, unspecified estrogen receptor status, unspecified site of breast [facility]) [address]    * Discharge Diagnoses:    Hospital Problems as of 2/23/2018  Date Reviewed: 2/21/2018          Codes Class Noted - Resolved POA    Breast cancer in female [facility]) ICD-10-CM: C50.919  ICD-9-CM: 174.9  2/21/2018 - Present Unknown              * Hospital Course: Underwent uneventful surgery date of admission. Initially required IV pain meds for pain control. Diet advanced and pain brought under better control. Activity advanced and ready for discharge. * Procedures:   Procedure(s):  RIGHT SENTINAL LYMPH NODE BIOPSY  BREAST MASTECTOMY SIMPLE RIGHT  BREAST RECONSTRUCTION RIGHT      Consults: None      Discharge Exam:  A&O x3  RRR  Resp clear  Abd soft  Right breast incisions c/d/i. No hematoma or seroma    * Discharge Condition: Good  * Disposition: Home    Discharge Medications:  Current Discharge Medication List          * Follow-up Care/Patient Instructions: Activity: As tolerated  Diet: Regular  Wound Care: Keep dressings in place until return to clinic.     Follow-up Information     None          Signed:  Inge Duncan MD  2/23/2018  10:03 AM

## 2018-02-23 NOTE — PROGRESS NOTES
END OF SHIFT NOTE:    INTAKE/OUTPUT  02/21 0701 - 02/22 0700  In: 3595 [P.O.:240; I.V.:2350]  Out: 9493 [ALNNL:7836; Drains:160]  Voiding: YES  Catheter: NO  Drain:   Get-Kraft Drain 02/21/18 Right; Lower; Other (Comment) Chest (Active)   Site Assessment Clean, dry, & intact 2/22/2018  2:30 PM   Dressing Status Clean, dry, & intact 2/22/2018  2:30 PM   Status Patent; Charged;Draining 2/22/2018  2:30 PM   Drainage Color Serosanguinous 2/22/2018  2:30 PM   Output (ml) 15 ml 2/22/2018  5:05 PM       Get-Kraft Drain 02/21/18 Right;Other (Comment); Lower Chest (Active)   Site Assessment Clean, dry, & intact 2/22/2018  2:30 PM   Dressing Status Clean, dry, & intact 2/22/2018  2:30 PM   Status Patent; Charged;Draining 2/22/2018  2:30 PM   Drainage Color Serosanguinous 2/22/2018  2:30 PM   Output (ml) 25 ml 2/22/2018  5:05 PM       Get-Kraft Drain 02/21/18 Right; Lower Breast (Active)   Site Assessment Clean, dry, & intact 2/22/2018  2:30 PM   Dressing Status Clean, dry, & intact 2/22/2018  2:30 PM   Status Patent; Charged;Draining 2/22/2018  2:30 PM   Drainage Color Serosanguinous 2/22/2018  2:30 PM   Output (ml) 5 ml 2/22/2018  5:05 PM               Flatus: Patient does have flatus present. Stool:  0 occurrences. Characteristics:       Emesis: 0 occurrences. Characteristics:        VITAL SIGNS  Patient Vitals for the past 12 hrs:   Temp Pulse Resp BP SpO2   02/22/18 1705 98.5 °F (36.9 °C) 94 18 124/75 97 %   02/22/18 1100 98.1 °F (36.7 °C) 85 16 147/75 98 %       Pain Assessment  Pain Intensity 1: 3 (02/22/18 1650)  Pain Location 1: Breast, Chest  Pain Intervention(s) 1: Medication (see MAR)  Patient Stated Pain Goal: 3    Ambulating  Yes    Shift report given to oncoming nurse at the bedside.     Lenoard Bottom, RN

## 2018-02-23 NOTE — PHYSICIAN ADVISORY
Letter of Determination: Outpatient Status Appropriate    This patient was originally hospitalized as Outpatient Status with observation services on 2/22/2018 for scheduled right mastectomy with latissimus dorsai flap recontstruction. This patient is appropriate for Inpatient Status in accordance with CMS regulation Section 43 .3 and the Inpatient Only List.     It is our recommendation that this patient's hospitalization status should be INPATIENT status.      The final decision regarding the patient's hospitalization status depends on the attending physician's judgement.       Debra Arndt MD, RUBIO,   Physician [facility].

## 2018-02-23 NOTE — DISCHARGE INSTRUCTIONS
Keep breast dressing in place and dry until return to clinic. Avoid any lifting more than 5 lbs  Do not lift arms above shoulders       Thursday 3/1/2018 [address] [place] [address] Surgery 1:00 PM               DISCHARGE SUMMARY from Nurse    PATIENT INSTRUCTIONS:    After general anesthesia or intravenous sedation, for 24 hours or while taking prescription Narcotics:  · Limit your activities  · Do not drive and operate hazardous machinery  · Do not make important personal or business decisions  · Do  not drink alcoholic beverages  · If you have not urinated within 8 hours after discharge, please contact your surgeon on call. Report the following to your surgeon:  · Excessive pain, swelling, redness or odor of or around the surgical area  · Temperature over 100.5  · Nausea and vomiting lasting longer than 4 hours or if unable to take medications  · Any signs of decreased circulation or nerve impairment to extremity: change in color, persistent  numbness, tingling, coldness or increase pain  · Any questions    What to do at Home:  Recommended activity: Activity as tolerated, per MD instructions, No heavy lifting    If you experience any of the following symptoms fever > 100.5, nausea, vomiting, pain, chest pain and/or shortness of breath please follow up with MD.    *  Please give a list of your current medications to your Primary Care Provider. *  Please update this list whenever your medications are discontinued, doses are      changed, or new medications (including over-the-counter products) are added. *  Please carry medication information at all times in case of emergency situations. These are general instructions for a healthy lifestyle:    No smoking/ No tobacco products/ Avoid exposure to second hand smoke  Surgeon General's Warning:  Quitting smoking now greatly reduces serious risk to your health.     Obesity, smoking, and sedentary lifestyle greatly increases your risk for illness    A healthy diet, regular physical exercise & weight monitoring are important for maintaining a healthy lifestyle    You may be retaining fluid if you have a history of heart failure or if you experience any of the following symptoms:  Weight gain of 3 pounds or more overnight or 5 pounds in a week, increased swelling in our hands or feet or shortness of breath while lying flat in bed. Please call your doctor as soon as you notice any of these symptoms; do not wait until your next office visit. Recognize signs and symptoms of STROKE:    F-face looks uneven    A-arms unable to move or move unevenly    S-speech slurred or non-existent    T-time-call 911 as soon as signs and symptoms begin-DO NOT go       Back to bed or wait to see if you get better-TIME IS BRAIN. Warning Signs of HEART ATTACK     Call 911 if you have these symptoms:   Chest discomfort. Most heart attacks involve discomfort in the center of the chest that lasts more than a few minutes, or that goes away and comes back. It can feel like uncomfortable pressure, squeezing, fullness, or pain.  Discomfort in other areas of the upper body. Symptoms can include pain or discomfort in one or both arms, the back, neck, jaw, or stomach.  Shortness of breath with or without chest discomfort.  Other signs may include breaking out in a cold sweat, nausea, or lightheadedness. Don't wait more than five minutes to call 911 - MINUTES MATTER! Fast action can save your life. Calling 911 is almost always the fastest way to get lifesaving treatment. Emergency Medical Services staff can begin treatment when they arrive -- up to an hour sooner than if someone gets to the hospital by car. The discharge information has been reviewed with the patient. The patient verbalized understanding.   Discharge medications reviewed with the patient and appropriate educational materials and side effects teaching were provided. ___________________________________________________________________________________________________________________________________       Mastectomy: What to Expect at Home  Your Recovery  Right after the surgery you will probably feel weak, and you may feel sore for 2 to 3 days. You may have a pulling or stretching sensation near or under your arm. You may also have itching, tingling, and throbbing in the area. This will get better in a few days. You will probably have a plastic or rubber tube, called a drain, to collect fluid from under the affected arm on the side of the surgery. Your doctor will remove this when the fluid buildup slows. This can be in a few days or even several weeks. You may be able to go back to your normal routine or return to work in several weeks, but it may take longer. How long it takes you to recover will depend on the type of surgery you had. It also depends on whether you had breast reconstruction at the same time, or if you need other treatment. Your doctor or nurse will be able to give you an idea of what you can expect. When you find out that you have cancer, you may feel many emotions and may need some help coping. This is common. Seek out family, friends, and counselors for support. You also can do things at home to make yourself feel better while you go through treatment. Call the [employer] (6-519.415.1246) or visit its website at [employer] [employer] for more information. This care sheet gives you a general idea about how long it will take for you to recover. But each person recovers at a different pace. Follow the steps below to get better as quickly as possible. How can you care for yourself at home? Activity  ? · Rest when you feel tired. Getting enough sleep will help you recover. After any activity, rest and raise your affected arm for a period of time equal to your activity time. ? · Try to walk each day.  Start by walking a little more than you did the day before. Bit by bit, increase the amount you walk. Walking boosts blood flow and helps prevent pneumonia and constipation. ? · Avoid strenuous activities, such as biking, jogging, weightlifting, or aerobic exercise, until your doctor says it is okay. This includes housework, especially if you have to use your affected arm. You will probably be able to do your normal activities in 3 to 6 weeks. Avoid repeated motions with your affected arm, such as weed pulling, window cleaning, or vacuuming, for 6 months. ? · Avoid lifting anything over 10 to 15 pounds for 4 to 6 weeks. This may include a child, grocery bags, a heavy briefcase or backpack, cat litter or dog food bags, or a vacuum . ? · Ask your doctor when you can drive again. ? · You will probably be able to go back to work or your normal routine in 3 to 6 weeks. This depends on the type of work you do and any further treatment. ? · Your doctor will let you know how soon you can take showers or baths. Diet  ? · You can eat your normal diet. If your stomach is upset, try bland, low-fat foods like plain rice, broiled chicken, toast, and yogurt. ? · Drink plenty of fluids (unless your doctor tells you not to). ? · You may notice that your bowels are not regular right after your surgery. This is common. Try to avoid constipation and straining with bowel movements. Take a fiber supplement such as Citrucel or Metamucil every day. If you have not had a bowel movement after a couple of days, take a mild laxative like Milk of Magnesia or a stool softener like Colace. Medicines  ? · Your doctor will tell you if and when you can restart your medicines. He or she will also give you instructions about taking any new medicines. ? · If you take blood thinners, such as warfarin (Coumadin), clopidogrel (Plavix), or aspirin, be sure to talk to your doctor. He or she will tell you if and when to start taking those medicines again.  Make sure that you understand exactly what your doctor wants you to do. ? · Take pain medicines exactly as directed. ¨ If the doctor gave you a prescription medicine for pain, take it as prescribed. ¨ If you are not taking a prescription pain medicine, ask your doctor if you can take an over-the-counter medicine. ? · If your doctor prescribed antibiotics, take them as directed. Do not stop taking them just because you feel better. You need to take the full course of antibiotics. ? · If you think your pain medicine is making you sick to your stomach:  ¨ Take your medicine after meals (unless your doctor has told you not to). ¨ Ask your doctor for a different pain medicine. Incision care  ? · You will have a dressing over the cut (incision). A dressing helps the incision heal and protects it. Your doctor will tell you how to take care of this. ? · You may be wearing a special bra (surgi-bra) that holds your dressing in place after the surgery. Your doctor will tell you when you can stop wearing the bra. ?Arm exercises  ? · If you had any lymph nodes removed from under your arm, your doctor will advise you to do arm exercises. Do not do the exercises until your doctor says it is okay. Ice and elevation  ? · Do not use ice for swelling or pain. ? · Prop up your arm on a pillow when you sit or lie down. Try to keep your arm above the level of your heart. This will help reduce swelling. Other instructions  ? · You may have one or more drains in your surgery site. Your doctor will tell you how to take care of them. Follow-up care is a key part of your treatment and safety. Be sure to make and go to all appointments, and call your doctor if you are having problems. It's also a good idea to know your test results and keep a list of the medicines you take. When should you call for help? Call 911 anytime you think you may need emergency care. For example, call if:  ? · You passed out (lost consciousness).    ? · You have chest pain, are short of breath, or cough up blood. ?Call your doctor now or seek immediate medical care if:  ? · You are sick to your stomach or cannot drink fluids. ? · You cannot pass stools or gas. ? · You have pain that does not get better after you take your pain medicine. ? · You have loose stitches, or your incision comes open. ? · Bright red blood has soaked through the bandage over your incision. ? · You have signs of a blood clot in your leg (called a deep vein thrombosis), such as:  ¨ Pain in your calf, back of the knee, thigh, or groin. ¨ Redness or swelling in your leg. ? · You have signs of infection, such as:  ¨ Increased pain, swelling, warmth, or redness. ¨ Red streaks leading from the incision. ¨ Pus draining from the incision. ¨ A fever. ? Watch closely for changes in your health, and be sure to contact your doctor if:  ? · You have any problems. ? · You have new or worse swelling or pain in your arm. Where can you learn more? Go to http://shivam-ml.info/. Enter B973 in the search box to learn more about \"Mastectomy: What to Expect at Home. \"  Current as of: May 12, 2017  Content Version: 11.4  © 2628-3669 [employer]. Care instructions adapted under license by [employer] (which disclaims liability or warranty for this information). If you have questions about a medical condition or this instruction, always ask your healthcare professional. [place] [address] any warranty or liability for your use of this information.

## 2018-02-23 NOTE — PROGRESS NOTES
Pt's D/C instructions completed. Verbalized understanding of all instructions including diet, activity, s/sx to alert MD, medications, wound care, and f/u appointment. Family called at this time for transportation. Patient's discharged delayed due to scripts, calling MD and patients pharmacy for verification that scripts had been sent prior to discharge.

## 2018-02-24 NOTE — ANESTHESIA POSTPROCEDURE EVALUATION
Post-Anesthesia Evaluation and Assessment    Patient: Roberta Maddox MRN: [id number]  SSN: [id number]    YOB: 1950  Age: 75 y.o. Sex: female       Cardiovascular Function/Vital Signs  Visit Vitals    /86    Pulse 91    Temp 36.6 °C (97.9 °F)    Resp 16    Ht 5' 1.5\" (1.562 m)    Wt 74.6 kg (164 lb 6 oz)    SpO2 98%    BMI 30.56 kg/m2       Patient is status post general anesthesia for Procedure(s):  RIGHT SENTINAL LYMPH NODE BIOPSY  BREAST MASTECTOMY SIMPLE RIGHT  BREAST RECONSTRUCTION RIGHT. Nausea/Vomiting: None    Postoperative hydration reviewed and adequate. Pain:  Pain Scale 1: Numeric (0 - 10) (02/23/18 1405)  Pain Intensity 1: 3 (02/23/18 1405)   Managed    Neurological Status:   Neuro (WDL): Within Defined Limits (02/21/18 1919)   At baseline    Mental Status and Level of Consciousness: Arousable    Pulmonary Status:   O2 Device: Room air (02/23/18 0715)   Adequate oxygenation and airway patent    Complications related to anesthesia: None    Post-anesthesia assessment completed.  No concerns    Signed By: Maria G Lopez MD     February 24, 2018

## 2018-02-26 ENCOUNTER — PATIENT OUTREACH (OUTPATIENT)
Dept: CASE MANAGEMENT | Age: 68
End: 2018-02-26

## 2018-02-26 NOTE — PROGRESS NOTES
Transition of Care Discharge Follow-up Questionnaire   Date/Time of STALIN Outreach: 2/26/2018 2:23 PM   What was the patient hospitalized for? Patient was hospitalized for Breast Cancer in [place]   Does the patient understand his/her diagnosis and/or treatment and what happened during the hospitalization? Spoke to Patient who consented to JUST[facility]UENT[facility] outreach, and verbalized understanding of treatment and diagnosis. Did the patient receive discharge instructions? Patient states d/c instructions received. Review any discharge instructions (see notes in [facility]). Ask patient if they understand these. Do they have any questions? Patient discussed discharge plan and instruction as Patient understood it to be with Care Coordinator. Which I have documented in the pertinent areas throughout this progress note. Were home services ordered (nursing, PT, OT, ST, etc.)? No HH ordered at d/c. If so, has the first visit occurred? If not, why? (Assist with coordination of services if necessary.) N/A   Was any DME ordered? No DME ordered at d/c. If so, has it been received? If not, why?  (Assist with coordination of arranging DME orders if necessary.) N/A   Complete a review of all medications (new, continued and discontinued meds per the D/C instructions and medication tab in [address] [place] [address]). Review of all meds completed with Patient Care Coordinator Percocet, Keflex  prescribed at d/c. Were all new prescriptions filled? If not, why?  (Assist with obtainment of medications if necessary.) Yes. Does the patient understand the purpose and dosing instructions for all medications? (If patient has questions, provide explanation and education.) Indicated by Patient to Care Coordinator, the purpose and dosing instructions for all medications were understood. Does the patient have any problems in performing ADLs? (If patient is unable to perform ADLs  what is the limiting factor(s)?   Do they have a support system that can assist? If no support system is present, discuss possible assistance that they may be able to obtain.) Patient states she is independent with all ADLs. Does the patient have all follow-up appointments scheduled? 7 day f/up with PCP?    7-14 day f/up with specialist?    If f/up has not been made  what actions has the care coordinator made to accomplish this? Has transportation been arranged? [facility] follow-up should be within 7 days of discharge; all others should have PCP follow-up within 7 days of discharge; follow-ups with other specialists as appropriate or ordered.) F/U with Plastic Surgery is 3/1 and 3/2. Patient states she has transportation. Pt. advised and educated on importance of PCP f/u within 14 days post d/c, and stated understanding. Patient declined assistance in scheduling. Patient stated I will call and schedule tomorrow. Pt. states she has transportation. .     Any other questions or concerns expressed by the patient? Gratitude stated and no further questions asked or needs identified. STALIN Call Completed By:   John Paul Zamora LPN/ Care Coordinator  [address]  [address] [place] [address]  [email or web address]         This note will not be viewable in [facility].

## 2018-03-05 ENCOUNTER — PATIENT OUTREACH (OUTPATIENT)
Dept: CASE MANAGEMENT | Age: 68
End: 2018-03-05

## 2018-03-05 PROBLEM — E11.21 TYPE 2 DIABETES WITH NEPHROPATHY (HCC): Status: ACTIVE | Noted: 2018-03-05

## 2018-03-05 NOTE — PROGRESS NOTES
3/5/2018 Saw the patient with Dr Emerson Plata. She previously was treated with surgery, radiation and Tamoxifen. She now has new diagnosis of breast cancer. This breast cancer was invading the muscle and Dr Emerson Plata would like her to see radiation oncology. He also discussed with the patient that Arimidex would definitely be needed which I have given her printed information regarding. We will also send Oncotype Dx to see if chemo would be of benefit. Navigation information given to patient.

## 2018-03-15 ENCOUNTER — HOSPITAL ENCOUNTER (OUTPATIENT)
Dept: RADIATION ONCOLOGY | Age: 68
Discharge: HOME | End: 2018-03-15
Payer: COMMERCIAL

## 2018-03-15 VITALS
DIASTOLIC BLOOD PRESSURE: 77 MMHG | BODY MASS INDEX: 30.87 KG/M2 | OXYGEN SATURATION: 99 % | SYSTOLIC BLOOD PRESSURE: 141 MMHG | TEMPERATURE: 98 F | HEART RATE: 80 BPM | WEIGHT: 163.4 LBS

## 2018-03-15 PROCEDURE — 99211 OFF/OP EST MAY X REQ PHY/QHP: CPT

## 2018-03-15 NOTE — CONSULTS
Patient: Asha Alicea MRN: [id number]  SSN: [id number]    YOB: 1950  Age: 75 y.o. Sex: female      Other Providers:  Pari Healy MD    CHIEF COMPLAINT: Breast cancer    DIAGNOSIS: Right breast invasive ductal carcinoma, [id number] with chest wall invasion, Stage IIA. ER 99%/DC 59% positive, HER-2 negative (0). Prior right sided breast cancer with lumpectomy and radiation (1999). PREVIOUS TREATMENT:  1) 2/21/18:  Right breast mastectomy with sentinel node biopsy    HISTORY OF PRESENT ILLNESS:  Asha Alicea is a 75 y.o. female who I am seeing at the request of Dr. Aniceto Hung. She has a history of lumpectomy in 1999 for breast cancer which was treated adjuvantly with radiation and 5 years of tamoxifen at [facility]. She presented for routine screening mammogram on 11/15/17 which identified a new, incompletely characterized, posterior right breast mass. Right breast ultrasound on 11/17/18 confirmed a 2.4 cm x 1 cm x 1.8 cm hypoechoic abnormality at the level of a prior scar. MRI 11/28/17 demonstrated a 2.1 cm x 1.6 cm x 1.7 cm enhancing mass at the level of the patient's right breast lumpectomy bed concerning for locally recurrent malignancy with evidence for involvement of the directly adjacent pectoralis muscle. Biopsy showed grade 1 IDC with lobular features, ER 99%/DC 59% positive, HER-2 negative (0), low grade, well differentiated, infiltrating duct carcinoma with lobular features in association with ductal carcinoma in situ, intermediate grade (cribriform type), with no definite lymphovascular invasion identified. Secondary to her previous right breast cancer and treatment with radiation, patient was recommended for mastectomy and sentinel node biopsy completed 2/21/18. This showed the tumor to be 2.4 cm in greatest dimension with carcinoma less than 0.1 cm from marked deep margin (invading skeletal muscle). All 4 nodes were negative for metastatic carcinoma.   She was referred to oncology for evaluation and treatment of her newly diagnosed right breast cancer and saw Dr. Awilda Hernandez 3/5/18 who recommended Oncotype for consideration of chemotherapy and an AI. She is completing expansions with Dr. Zeenat Umana and had drains in place at time of initial consultation. OBSTETRIC HISTORY:    Menarche at the age of 14.    G4,P2. Oral Contraceptive Pills:  7 years  Hormone Replacement Therapy:  \"Briefly\"  Hysterectomy in 1979. PAST MEDICAL HISTORY:    Past Medical History:   Diagnosis Date    Abnormal results of thyroid function studies     Anemia     Anxiety     BPV (benign positional vertigo)     Breast cancer ([facility] [address] 1999    right---radiation and 5 years of tamoxifen--lumpectomy    Breast cancer ([facility] [address] 2018    right    Chronic depression     Chronic tension headache     Diabetes mellitus type II, controlled ([facility] [address]     saxenda- bs: does not check blood sugars.  Elevated glucose     Encounter for long-term (current) use of high-risk medication     Encounter for long-term (current) use of medications     Fatigue     Heart palpitations     Hematuria     Hot flashes, menopausal     Hyperlipidemia     Hypertension, benign     Insomnia     Menopausal disorder     Microcytosis     Obesity        The patient denies history of collagen vascular diseases, pacemaker insertion, prior radiation or prior chemotherapy.      PAST SURGICAL HISTORY:   Past Surgical History:   Procedure Laterality Date    HX BREAST BIOPSY  1999    HX BREAST LUMPECTOMY  1999    HX BREAST RECONSTRUCTION Right 2/21/2018    BREAST RECONSTRUCTION RIGHT [facility] Miguel[facility] MA[facility]    HX CATARACT REMOVAL Left 10/2017     with IOL    HX CATARACT REMOVAL Right 11/2017    with IOL    HX MASTECTOMY Right 2/21/2018    BREAST MASTECTOMY SIMPLE RIGHT performed by Salazar Acosta MD at [facility] w/ ovaries       MEDICATIONS:     Current Outpatient Prescriptions:   oxyCODONE-acetaminophen (PERCOCET) 5-325 mg per tablet, Take 1 Tab by mouth every four (4) hours as needed. Max Daily Amount: 6 Tabs., Disp: 60 Tab, Rfl: 0    nebivolol (BYSTOLIC) 5 mg tablet, Take 1 Tab by mouth daily. Indications: hypertension (Patient taking differently: Take 5 mg by mouth daily. Indications: hypertension, am- take on the dos), Disp: 90 Tab, Rfl: 3    ALPRAZolam (XANAX) 0.5 mg tablet, Take 1 Tab by mouth nightly. Max Daily Amount: 0.5 mg. 1/2 to 1 for insomnia (Patient taking differently: Take 0.5 mg by mouth nightly as needed. 1/2 to 1 for insomnia  Indications: anxiety), Disp: 90 Tab, Rfl: 1    venlafaxine (EFFEXOR) 75 mg tablet, Take 1 Tab by mouth two (2) times a day. (Patient taking differently: Take 75 mg by mouth two (2) times a day. Indications: VASOMOTOR SYMPTOMS ASSOCIATED WITH MENOPAUSE, take on the dos), Disp: 180 Tab, Rfl: 3    spironolactone (ALDACTONE) 25 mg tablet, Take 1 Tab by mouth daily. (Patient taking differently: Take 25 mg by mouth daily. Indications: am), Disp: 90 Tab, Rfl: 3    liraglutide (SAXENDA) 3 mg/0.5 mL (18 mg/3 mL) pen, 0.5 mL by SubCUTAneous route daily. (Patient taking differently: 3 mg by SubCUTAneous route every evening. Indications: takes for diabetes management), Disp: 15 Pen, Rfl: 3    Insulin Needles, Disposable, (ARIANA PEN NEEDLE) 32 gauge x 5/32\" ndle, 1 Pen Needle by Does Not Apply route daily. , Disp: 90 Pen Needle, Rfl: 3    amLODIPine (NORVASC) 5 mg tablet, Take 1 Tab by mouth daily. (Patient taking differently: Take 5 mg by mouth daily. Indications: am- take on the dos), Disp: 90 Tab, Rfl: 3    butalbital-acetaminophen-caffeine (FIORICET, ESGIC) -40 mg per tablet, Take 1-2 Tabs by mouth four (4) times daily as needed for Pain. Max Daily Amount: 8 Tabs. (Patient taking differently: Take 1-2 Tabs by mouth four (4) times daily as needed for Pain.  Indications: Migraine), Disp: 30 Tab, Rfl: 1    fluticasone (FLONASE) 50 mcg/actuation nasal spray, 2 Sprays by Both Nostrils route daily. (Patient taking differently: 2 Sprays by Both Nostrils route daily as needed. Indications: Allergic Rhinitis), Disp: 1 Bottle, Rfl: 0    aspirin 81 mg chewable tablet, Take 81 mg by mouth daily. Indications: myocardial infarction prevention, am- hold until after surgery, Disp: , Rfl:     Calcium-Vitamin D3-Vitamin K (VIACTIV) 369-324-20 mg-unit-mcg chew, Take 1 Tab by mouth two (2) times a day. Indications: HYPOCALCEMIA PREVENTION, hold until after surgery, Disp: , Rfl:     ALLERGIES:   Allergies   Allergen Reactions    Ambien [Zolpidem] Other (comments)     Makes her dream    Buspar [Buspirone] Vertigo    Iron Other (comments)     constipation    Macrodantin [Nitrofurantoin Macrocrystalline] Unknown (comments)    Milk Other (comments)     Causes stomach problems       SOCIAL HISTORY:   Social History     Social History    Marital status:      Spouse name: N/A    Number of children: N/A    Years of education: N/A     Occupational History    Not on file. Social History Main Topics    Smoking status: Never Smoker    Smokeless tobacco: Never Used    Alcohol use No    Drug use: No    Sexual activity: Not on file     Other Topics Concern    Not on file     Social History Narrative       FAMILY HISTORY:   Family History   Problem Relation Age of Onset    Uterine Cancer Mother     Hypertension Mother     Stroke Father        REVIEW OF SYSTEMS: A full 12 point review of systems was completed and was negative unless noted in the history of present illness. PHYSICAL EXAMINATION:   ECOG Performance status 0  VITAL SIGNS:   Visit Vitals    /77 (BP 1 Location: Left arm, BP Patient Position: Sitting)    Pulse 80    Temp 98 °F (36.7 °C)    Wt 74.1 kg (163 lb 6.4 oz)    SpO2 99%    BMI 30.87 kg/m2        GENERAL: The patient is well-developed, ambulatory, alert and in no acute distress. HEENT: Head is normocephalic, atraumatic.  Pupils are equal, round and reactive to light and accommodation. Extraocular movement intact. Hearing is intact bilaterally to finger rub. Oral cavity reveals no lesions. Mucous membranes are moist. NECK: Neck is supple with no masses. CARDIOVASCULAR: Heart is regular rate and rhythm. There are no murmurs rubs or gallups. Radial pulses are 2+ RESPIRATORY: Lungs are clear to auscultation and percussion. There is normal respiratory effort. GASTROINTESTINAL: The abdomen is soft, non-tender, nondistended with no hepatospelnomagaly. Digital rectal examination: deferred LYMPHATIC: There is no cervical, supraclavicular or axillary lymphadenopathy bilaterally. MUSCULOSKELETAL: Extremities reveal no cyanosis, clubbing or edema.  is 5+/5. BREASTS: Examination of the unaffected breast reveals no dominant nodules or masses. The right-sided chest wall still has a drain in place with sutures. There is no sign of local recurrence or infection. Marlen Glee NEURO:  Cranial nerves II-XII grossly intact. Muscular strength and sensation are intact throughout all four extremities. PATHOLOGY:    2/21/18:     DIAGNOSIS   A: RIGHT MASTECTOMY: INFILTRATING DUCT CARCINOMA WITH LOBULAR AND MICROPAPILLARY FEATURES, INTERMEDIATE GRADE (MODERATELY DIFFERENTIATED) MEASURING APPROXIMATELY 2.4 X 2.1 X 1.7 CM. AREA SUSPICIOUS FOR LYMPHOVASCULAR INVASION. INFILTRATING CARCINOMA IS PRESENT LESS THAN 0.1 CM FROM MARKED DEEP MARGIN (INVADING SKELETAL MUSCLE). OTHER MARGINS ARE GREATER THAN 1 CM FROM TUMOR. DEFINITE IN SITU COMPONENT IS NOT IDENTIFIED. CHANGES CONSISTENT WITH PRIOR BIOPSY SITE (SEE [id number]). SEE COMMENT. B: RIGHT SENTINEL LYMPH NODE #1: TWO LYMPH NODES NEGATIVE FOR METASTATIC TUMOR. C: RIGHT SENTINEL LYMPH NODE #2: LYMPH NODE NEGATIVE FOR METASTATIC CARCINOMA. D: RIGHT NONSENTINEL LYMPH NODE: ADIPOSE TISSUE WITHOUT IDENTIFIABLE LYMPH NODE. E: RIGHT SENTINEL LYMPH NODE #3: LYMPH NODE NEGATIVE FOR METASTATIC CARCINOMA. Comment   Infiltrating carcinoma in this specimen is consistent with that present on prior core biopsy, [id number], which has estrogen receptors of 99%, progesterone receptors of 59% and Her-2 negative. If clinically indicated, receptor studies can be repeated in this material.      A: ANATOMIC SITE: Right breast (presumably 6 o'clock position, 8 cm from nipple based on prior core biopsy). PROCEDURE: Mastectomy and sentinel node excision. HISTOLOGIC TYPE: Infiltrating duct carcinoma with lobular and micropapillary features. SIZE: Approximately 2.4 x 2.1 x 1.7 cm. UNIFOCAL OR MULTIFOCAL: Apparently unifocal.   PRESENCE OF SKIN, NIPPLE OR SKELETAL MUSCLE INVOLVEMENT: Skeletal muscle is involved in area of deep margin. RITCHIE MODIFICATION OF BLOOM-LOPEZ GRADE:   ARCHITECTURAL SCORE: 2/3   NUCLEAR SCORE: 3/3   MITOTIC SCORE: 2/3   TOTAL SCORE: 7/9 = Intermediate grade. IN-SITU COMPONENT: Not definitely identified. LYMPHOVASCULAR INVASION: Area suspicious for lymphovascular invasion. ARE MICROCALCIFICATIONS IDENTIFIED: Yes.   TUMOR DISTANCE TO CLOSEST MARGIN: Deep margin less than 0.1 cm. ANTERIOR (SUPERFICIAL): Greater than 1 cm. POSTERIOR (DEEP): Less than 0.1 cm. MEDIAL: Greater than 1 cm. LATERAL: Greater than 1 cm. INFERIOR: Greater than 1 cm. SUPERIOR: Greater than 1 cm. LYMPH NODE STATUS:   TOTAL NUMBER OF LYMPH NODES CONTAINING CARCINOMA: 0   TOTAL NUMBER OF LYMPH NODES EXAMINED: 4   SIZE OF LARGEST POSITIVE NODE: Does not apply. EXTRA-CAPSULAR EXTENSION OF TUMOR: Does not apply. OTHER FINDINGS: Changes consistent with prior biopsy site.        LABORATORY:   Lab Results   Component Value Date/Time    Sodium 142 02/15/2018 10:59 AM    Potassium 4.0 02/15/2018 10:59 AM    Chloride 107 02/15/2018 10:59 AM    CO2 30 02/15/2018 10:59 AM    Anion gap 5 (L) 02/15/2018 10:59 AM    Glucose 83 02/15/2018 10:59 AM    BUN 11 02/15/2018 10:59 AM    Creatinine 1.00 02/15/2018 10:59 AM GFR est AA >60 02/15/2018 10:59 AM    GFR est non-AA 59 (L) 02/15/2018 10:59 AM    Calcium 10.0 02/15/2018 10:59 AM    Magnesium 2.1 05/23/2017 08:40 AM     Lab Results   Component Value Date/Time    WBC 6.2 02/15/2018 10:59 AM    HGB 12.0 02/15/2018 10:59 AM    HCT 37.7 02/15/2018 10:59 AM    PLATELET 549 71/43/5979 10:59 AM       RADIOLOGY:    I have personally reviewed the imaging and agree with the reports below. BILATERAL SCREENING MAMMOGRAM, 11/15/2017.     CLINICAL HISTORY: Routine screening mammogram.     Comparison studies:  Screening mammograms 7/20/2016, and 7/15/2015.     FINDINGS:     Bilateral digital screening mammography, interpreted in conjunction with CAD  overread. The breasts are composed of scattered fibroglandular elements. The  axilla contain benign appearing lymph nodes. No evidence of evolving skin  thickening, or nipple retraction is seen. Scattered punctate, and round  appearing calcifications are seen which are not felt to be worrisome given their  scattered distribution and typically benign appearance. No evolving unique  clustered microcalcifications are seen to suggest a worrisome process. A new  posterior right breast mass is partially visualized. This is felt to reside at  approximately the 9:00 position of the right breast located 10 cm from the  nipple. Otherwise, no evolving dominant mass, spiculated density, or  architectural distortion is seen.     IMPRESSION  IMPRESSION:  1. New posterior right breast mass which is incompletely characterized. This  appears to be a true finding and not superimposed shadows. Therefore, further  evaluation with focused ultrasound is recommended.     We are mailing breast density information to the patient along with the  mammogram report.     A reminder letter will be scheduled at the appropriate time pending additional  views.     BI-RADS Assessment Category 0: Incomplete: Needs additional imaging evaluation.     RIGHT BREAST ULTRASOUND, 11/17/2017.     CLINICAL HISTORY: Right breast abnormality seen on screening mammogram.     Technique: Grayscale, and Doppler imaging of the right breast soft tissues was  performed using a 12 MHz transducer.     FINDINGS:     An ultrasound abnormality is seen at the 6:00 position of the right breast  located 8 cm from the nipple felt to correspond to the prior mammogram  abnormality. This was previously described at 9:00 based on an MLO view. The  location is felt to be more accurately described by ultrasound. At this level,  there is a hypoechoic area measuring 2.4 cm x 1 cm x 1.8 cm in size which occurs  at the level of a prior right breast scar. Although this very well may represent  benign scar tissue, this does appear to be a new finding by mammography and has  not been demonstrated on any prior mammogram dating as far back as June 2005. Therefore, this felt that this should be more definitively confirmed by breast  MRI.     IMPRESSION  IMPRESSION:   1. 2.4 cm x 1 cm x 1.8 cm hypoechoic abnormality at the level of a prior scar  which may represent scar tissue although should be further characterized given  that this does appear to have demonstrated evolution by mammography. This would  be best performed with a contrasted bilateral breast MRI.     A reminder letter will be scheduled at the appropriate time pending additional  views.     BI-RADS Assessment Category 0: Incomplete: Needs additional imaging evaluation. MRI BILATERAL BREASTS WITHOUT AND WITH CONTRAST, 11/28/2017.      CLINICAL HISTORY:  79-year-old with history of prior right breast cancer in 1999  treated with lumpectomy and radiation. Presents for further assessment of  ultrasound abnormality seen at level of prior scar.  No current breast  complaints.     Technique: Multiplanar multisequence imaging of the breast were performed prior  to and following the uneventful intravenous administration of 15 mL of  Magnevist. Postcontrast imaging was performed using a dynamic technique. Images  were reviewed using the [employer].      Sequences obtained: Sagittal T2, axial STIR, axial T1, and axial fat-saturated  T1-weighted images prior to and following administration of contrast.      Comparison studies: Breast MRI 11/13/2008, and bilateral mammogram 11/15/2017.      FINDINGS:   The breasts are composed of heterogeneous fibroglandular elements. No enlarged  axillary lymph nodes are seen. No enlarged internal mammary lymph nodes are  seen. Evaluation of the lungs is limited by  MRI imaging. However, no obvious  pulmonary masses are seen. No significant pleural effusions are seen. The liver  is obscured by cardiac motion artifact and only partially visualized limiting  assessment. Multiple T2 hyperintense lesions are seen. Similar lesions were  seen on the prior MRI study in these grossly do not appear to enhance following  contrast administration and therefore favored to represent benign cysts.      Following the administration of intravenous contrast, normal enhancement is seen  of the heart and vascular structures of the breasts. Asymmetric enhancement is  seen of the breasts with mild to moderate left, and no significant right  background enhancement. This is likely due to prior right breast radiation as  indicated in the patient's clinical history. At the 7:00 position of the right  breast centered 9.4 cm from the nipple there is an enhancing mass measuring 2.1  cm x 1.6 cm x 1.7 cm in size at the level of the patient's prior lumpectomy scar  which is highly concerning for locally recurrent disease. There is abnormal  enhancement seen in the directly adjacent pectoralis muscle best appreciated on  postcontrast image 200 consistent with pectoralis muscle involvement. No  dominant enhancing masses, or worrisome patterns of enhancement are otherwise  seen.  Within again, there is asymmetric nodular enhancement in the left breast  without a dominant enhancing mass or segmental area of asymmetrically prominent  nonmasslike enhancement to suggest a worrisome process. No nodule definitely  demonstrating washout is demonstrated. No evidence of skin thickening, or  nipple retraction is seen. No enhancing osseous lesion is seen in the visualized  chest.     IMPRESSION  IMPRESSION:  1.  2.1 cm x 1.6 cm x 1.7 cm enhancing mass at the level of the patient's right  breast lumpectomy bed concerning for locally recurrent malignancy. There is  evidence for involvement of the directly adjacent pectoralis muscle. Further  evaluation with ultrasound-guided biopsy is recommended given that a prior  diagnostic ultrasound has demonstrated an abnormal lesion at this level. No  additional enhancing abnormalities are seen to suggest potential multifocal or  multicentric disease.     2. No evidence for metastatic disease in the visualized chest.     BI-RADS Assessment Category 4: Suspicious Finding- Biopsy should be considered. IMPRESSION:  Andra Osei is a 75 y.o. female with Stage IIA breast cancer. The natural history of breast cancer was reviewed with the patient. Prognostic features including stage, performance status and presence or absence of lymph node involvement were reviewed with the patient. She had noted skeletal muscle involvement, but this is still not considered T4 based on the AJCC staging guidelines. Various treatment options including lumpectomy alone, breast conservation therapy, and mastectomy were compared and contrasted with regard to outcome and quality of life. We discussed the data in support of Radiation following mastectomy. We discussed the [place] [facility] [place] clinical trials showing a survival benefit from the patient's receiving adjuvant radiation with chemotherapy as opposed to chemotherapy alone. This included patient's with T3, T4, and node positive disease.   There are several caveats to the trial including the fact that many patients did not receive adequate lymph node dissections. Despite these limitations, it has become the standard to treat adjuvantly for patients with T3 or T4 tumors as well as four or more lymph nodes. Furthermore, there is institutional series in support of radiation for patients with various risk factors other than lymph node positive disease including lymphovascular space invasion, tumor size greater than 2 cm, young age which is generally considered less than 48, and high-grade. Her tumor was greater than 2 cm, there was LVSI, and with the skeletal muscle invasion and area of close/positive margin at this location, I would offer post mastectomy radiation although I would only plan to treat the chest wall as opposed to the regional lymphatics as well. The practicalities, indications, benefits, side-effects and complications of radiation therapy were discussed. Skin changes fatigue, and potential for long-term complications including radiation pneumonitis, and rib fractures were among the complications highlighted. I have recommended a course of radiation therapy following mastectomy. The patient  asked numerous questions, which were answered to their satisfaction. She is still waiting a decision on chemotherapy and if this is delivered, we will deliver radiation following. She will also have expansions completed by Dr. Linden Dawn nd we will have to wait until these are fully expanded before proceeding. Plan:  1. Genetic testing-not indicated  2. Smoking cessation-not indicated  3. Patient will be simulated after full expansions or after chemotherapy depening on medical oncology's decision. A dose of 50 Gy to the chest wall and regional lymphatics will be followed by a boost to the scar.       More Smallwood MD  03/15/18

## 2018-03-15 NOTE — PROGRESS NOTES
Consult for right breast cancer. S/p right mastectomy 18. History of right lumpectomy, RT, Tamoxifen x 5 years. Oncotype testing 3-5-18. F/u Dr. Antony Holloway 3-26-18.  2/ Para 2. Started menses around age 14. Menses ended with partial hysterectomy in . Hx of contraceptive use starting in . Brief hx of HRT.     Angela Akins RN

## 2018-03-19 ENCOUNTER — PATIENT OUTREACH (OUTPATIENT)
Dept: CASE MANAGEMENT | Age: 68
End: 2018-03-19

## 2018-03-19 NOTE — PROGRESS NOTES
Attempted to reach patient for f/u call. Please note UTR patient and refer to phn log. Please note patient has completed Oncology and Surgical f/u as scheduled per CC. An Gross LPN/ Care Coordinator  [address]  [address] [place] [address]  [email or web address]    This note will not be viewable in [employer].

## 2018-03-21 ENCOUNTER — PATIENT OUTREACH (OUTPATIENT)
Dept: CASE MANAGEMENT | Age: 68
End: 2018-03-21

## 2018-06-07 ENCOUNTER — HOSPITAL ENCOUNTER (OUTPATIENT)
Dept: RADIATION ONCOLOGY | Age: 68
Discharge: HOME | End: 2018-06-07
Payer: COMMERCIAL

## 2018-06-07 VITALS
RESPIRATION RATE: 18 BRPM | BODY MASS INDEX: 31.65 KG/M2 | OXYGEN SATURATION: 100 % | HEART RATE: 89 BPM | SYSTOLIC BLOOD PRESSURE: 151 MMHG | WEIGHT: 167.5 LBS | TEMPERATURE: 98.4 F | DIASTOLIC BLOOD PRESSURE: 81 MMHG

## 2018-06-07 PROCEDURE — 77290 THER RAD SIMULAJ FIELD CPLX: CPT

## 2018-06-07 PROCEDURE — 99211 OFF/OP EST MAY X REQ PHY/QHP: CPT

## 2018-06-07 PROCEDURE — 77332 RADIATION TREATMENT AID(S): CPT

## 2018-06-07 NOTE — PROGRESS NOTES
Patient: Jenna Harris MRN: [id number]  SSN: [id number]    YOB: 1950  Age: 75 y.o. Sex: female      Other Providers:  Letitia García MD    DIAGNOSIS: Right breast invasive ductal carcinoma, [id number] with chest wall invasion, Stage IIA. ER 99%/MO 59% positive, HER-2 negative (0). Prior right sided breast cancer with lumpectomy and radiation (1999). PREVIOUS TREATMENT:  1) 2/21/18:  Right breast mastectomy with sentinel node biopsy    HISTORY OF PRESENT ILLNESS:  Jenna Harris is a 75 y.o. female who I am seeing at the request of Dr. Pancho Lambert back in follow up after expansion completion. She has a history of lumpectomy in 1999 for breast cancer which was treated adjuvantly with radiation and 5 years of tamoxifen at [facility]. She presented for routine screening mammogram on 11/15/17 which identified a new, incompletely characterized, posterior right breast mass. Right breast ultrasound on 11/17/18 confirmed a 2.4 cm x 1 cm x 1.8 cm hypoechoic abnormality at the level of a prior scar. MRI 11/28/17 demonstrated a 2.1 cm x 1.6 cm x 1.7 cm enhancing mass at the level of the patient's right breast lumpectomy bed concerning for locally recurrent malignancy with evidence for involvement of the directly adjacent pectoralis muscle. Biopsy showed grade 1 IDC with lobular features, ER 99%/MO 59% positive, HER-2 negative (0), low grade, well differentiated, infiltrating duct carcinoma with lobular features in association with ductal carcinoma in situ, intermediate grade (cribriform type), with no definite lymphovascular invasion identified. Secondary to her previous right breast cancer and treatment with radiation, patient was recommended for mastectomy and sentinel node biopsy completed 2/21/18. This showed the tumor to be 2.4 cm in greatest dimension with carcinoma less than 0.1 cm from marked deep margin (invading skeletal muscle). All 4 nodes were negative for metastatic carcinoma.   She was referred to oncology for evaluation and treatment of her newly diagnosed right breast cancer and saw Dr. Vicente Hickman 3/5/18 who recommended Oncotype for consideration of chemotherapy and an AI. She was completing expansions with Dr. Meredith Marcos and had drains in place at time of initial consultation. At follow-up 6/7/2018 her genetic risk or had returned as low risk, 18. She completed expansions and return for simulation. Dr. Vicente Hickman recommended endocrine therapy alone, Arimidex. She has no other concerns or complications. She returned for discussion, consent, and simulation planned for today. OBSTETRIC HISTORY:    Menarche at the age of 14.    G4,P2. Oral Contraceptive Pills:  7 years  Hormone Replacement Therapy:  \"Briefly\"  Hysterectomy in 1979. PAST MEDICAL HISTORY:    Past Medical History:   Diagnosis Date    Abnormal results of thyroid function studies     Anemia     Anxiety     BPV (benign positional vertigo)     Breast cancer ([facility] [facility] 1999    right---radiation and 5 years of tamoxifen--lumpectomy    Breast cancer ([facility] [address] 2018    right    Chronic depression     Chronic tension headache     Diabetes mellitus type II, controlled ([facility] [address]     saxenda- bs: does not check blood sugars.      Elevated glucose     Encounter for long-term (current) use of high-risk medication     Encounter for long-term (current) use of medications     Fatigue     Heart palpitations     Hematuria     Hot flashes, menopausal     Hyperlipidemia     Hypertension, benign     Insomnia     Menopausal disorder     Microcytosis     Obesity        PAST SURGICAL HISTORY:   Past Surgical History:   Procedure Laterality Date    HX BREAST BIOPSY  1999    HX BREAST LUMPECTOMY  1999    HX BREAST RECONSTRUCTION Right 2/21/2018    BREAST RECONSTRUCTION RIGHT performed by Wanda Hutchins MD at [address] [place] [address] HX CATARACT REMOVAL Left 10/2017     with IOL    HX CATARACT REMOVAL Right 11/2017    with IOL    HX MASTECTOMY Right 2/21/2018 BREAST MASTECTOMY SIMPLE RIGHT performed by Cyril Staley MD at [facility]    still w/ ovaries       MEDICATIONS:     Current Outpatient Prescriptions:     anastrozole (ARIMIDEX) 1 mg tablet, Take 1 Tab by mouth daily. Indications: Hormone Receptor Positive Breast Cancer, Disp: 90 Tab, Rfl: 3    nebivolol (BYSTOLIC) 5 mg tablet, Take 1 Tab by mouth daily. Indications: hypertension (Patient taking differently: Take 5 mg by mouth daily. Indications: hypertension, am- take on the dos), Disp: 90 Tab, Rfl: 3    ALPRAZolam (XANAX) 0.5 mg tablet, Take 1 Tab by mouth nightly. Max Daily Amount: 0.5 mg. 1/2 to 1 for insomnia (Patient taking differently: Take 0.5 mg by mouth nightly as needed. 1/2 to 1 for insomnia  Indications: anxiety), Disp: 90 Tab, Rfl: 1    venlafaxine (EFFEXOR) 75 mg tablet, Take 1 Tab by mouth two (2) times a day. (Patient taking differently: Take 75 mg by mouth two (2) times a day. Indications: VASOMOTOR SYMPTOMS ASSOCIATED WITH MENOPAUSE, take on the dos), Disp: 180 Tab, Rfl: 3    spironolactone (ALDACTONE) 25 mg tablet, Take 1 Tab by mouth daily. (Patient taking differently: Take 25 mg by mouth daily. Indications: am), Disp: 90 Tab, Rfl: 3    liraglutide (SAXENDA) 3 mg/0.5 mL (18 mg/3 mL) pen, 0.5 mL by SubCUTAneous route daily. (Patient taking differently: 3 mg by SubCUTAneous route every evening. Indications: takes for diabetes management), Disp: 15 Pen, Rfl: 3    Insulin Needles, Disposable, (ARIANA PEN NEEDLE) 32 gauge x 5/32\" ndle, 1 Pen Needle by Does Not Apply route daily. , Disp: 90 Pen Needle, Rfl: 3    amLODIPine (NORVASC) 5 mg tablet, Take 1 Tab by mouth daily. (Patient taking differently: Take 5 mg by mouth daily. Indications: am- take on the dos), Disp: 90 Tab, Rfl: 3    butalbital-acetaminophen-caffeine (FIORICET, ESGIC) -40 mg per tablet, Take 1-2 Tabs by mouth four (4) times daily as needed for Pain. Max Daily Amount: 8 Tabs.  (Patient taking differently: Take 1-2 Tabs by mouth four (4) times daily as needed for Pain. Indications: Migraine), Disp: 30 Tab, Rfl: 1    fluticasone (FLONASE) 50 mcg/actuation nasal spray, 2 Sprays by Both Nostrils route daily. (Patient taking differently: 2 Sprays by Both Nostrils route daily as needed. Indications: Allergic Rhinitis), Disp: 1 Bottle, Rfl: 0    aspirin 81 mg chewable tablet, Take 81 mg by mouth daily. Indications: myocardial infarction prevention, am- hold until after surgery, Disp: , Rfl:     Calcium-Vitamin D3-Vitamin K (VIACTIV) 768-162-23 mg-unit-mcg chew, Take 1 Tab by mouth two (2) times a day. Indications: HYPOCALCEMIA PREVENTION, hold until after surgery, Disp: , Rfl:     ALLERGIES:   Allergies   Allergen Reactions    Ambien [Zolpidem] Other (comments)     Makes her dream    Buspar [Buspirone] Vertigo    Iron Other (comments)     constipation    Macrodantin [Nitrofurantoin Macrocrystalline] Unknown (comments)    Milk Other (comments)     Causes stomach problems       SOCIAL HISTORY:   Social History     Social History    Marital status:      Spouse name: N/A    Number of children: N/A    Years of education: N/A     Occupational History    Not on file.      Social History Main Topics    Smoking status: Never Smoker    Smokeless tobacco: Never Used    Alcohol use No    Drug use: No    Sexual activity: Not Currently     Other Topics Concern    Not on file     Social History Narrative       FAMILY HISTORY:   Family History   Problem Relation Age of Onset    Uterine Cancer Mother     Hypertension Mother     Stroke Father     Cancer Maternal Grandfather      Lung     No Known Problems Paternal Grandmother     No Known Problems Paternal Grandfather        PHYSICAL EXAMINATION:   ECOG Performance status 0  VITAL SIGNS:   Visit Vitals    /81    Pulse 89    Temp 98.4 °F (36.9 °C)    Resp 18    Wt 76 kg (167 lb 8 oz)    SpO2 100%    BMI 31.65 kg/m2        GENERAL: The patient is well-developed, ambulatory, alert and in no acute distress. CARDIOVASCULAR: Heart is regular rate and rhythm. There are no murmurs rubs or gallups. Radial pulses are 2+ RESPIRATORY: Lungs are clear to auscultation and percussion. There is normal respiratory effort. GASTROINTESTINAL: The abdomen is soft, non-tender, nondistended with no hepatospelnomagaly. Digital rectal examination: deferred   BREASTS: Examination of the unaffected breast reveals no dominant nodules or masses. The right-sided chest wall is expanded. NEURO:  Cranial nerves II-XII grossly intact. Muscular strength and sensation are intact throughout all four extremities. PATHOLOGY:    2/21/18:     DIAGNOSIS   A: RIGHT MASTECTOMY: INFILTRATING DUCT CARCINOMA WITH LOBULAR AND MICROPAPILLARY FEATURES, INTERMEDIATE GRADE (MODERATELY DIFFERENTIATED) MEASURING APPROXIMATELY 2.4 X 2.1 X 1.7 CM. AREA SUSPICIOUS FOR LYMPHOVASCULAR INVASION. INFILTRATING CARCINOMA IS PRESENT LESS THAN 0.1 CM FROM MARKED DEEP MARGIN (INVADING SKELETAL MUSCLE). OTHER MARGINS ARE GREATER THAN 1 CM FROM TUMOR. DEFINITE IN SITU COMPONENT IS NOT IDENTIFIED. CHANGES CONSISTENT WITH PRIOR BIOPSY SITE (SEE [id number]). SEE COMMENT. B: RIGHT SENTINEL LYMPH NODE #1: TWO LYMPH NODES NEGATIVE FOR METASTATIC TUMOR. C: RIGHT SENTINEL LYMPH NODE #2: LYMPH NODE NEGATIVE FOR METASTATIC CARCINOMA. D: RIGHT NONSENTINEL LYMPH NODE: ADIPOSE TISSUE WITHOUT IDENTIFIABLE LYMPH NODE. E: RIGHT SENTINEL LYMPH NODE #3: LYMPH NODE NEGATIVE FOR METASTATIC CARCINOMA. Comment   Infiltrating carcinoma in this specimen is consistent with that present on prior core biopsy, [id number], which has estrogen receptors of 99%, progesterone receptors of 59% and Her-2 negative. If clinically indicated, receptor studies can be repeated in this material.      A: ANATOMIC SITE: Right breast (presumably 6 o'clock position, 8 cm from nipple based on prior core biopsy).    PROCEDURE: Mastectomy and sentinel node excision. HISTOLOGIC TYPE: Infiltrating duct carcinoma with lobular and micropapillary features. SIZE: Approximately 2.4 x 2.1 x 1.7 cm. UNIFOCAL OR MULTIFOCAL: Apparently unifocal.   PRESENCE OF SKIN, NIPPLE OR SKELETAL MUSCLE INVOLVEMENT: Skeletal muscle is involved in area of deep margin. RITCHIE MODIFICATION OF BLOOM-LOPEZ GRADE:   ARCHITECTURAL SCORE: 2/3   NUCLEAR SCORE: 3/3   MITOTIC SCORE: 2/3   TOTAL SCORE: 7/9 = Intermediate grade. IN-SITU COMPONENT: Not definitely identified. LYMPHOVASCULAR INVASION: Area suspicious for lymphovascular invasion. ARE MICROCALCIFICATIONS IDENTIFIED: Yes.   TUMOR DISTANCE TO CLOSEST MARGIN: Deep margin less than 0.1 cm. ANTERIOR (SUPERFICIAL): Greater than 1 cm. POSTERIOR (DEEP): Less than 0.1 cm. MEDIAL: Greater than 1 cm. LATERAL: Greater than 1 cm. INFERIOR: Greater than 1 cm. SUPERIOR: Greater than 1 cm. LYMPH NODE STATUS:   TOTAL NUMBER OF LYMPH NODES CONTAINING CARCINOMA: 0   TOTAL NUMBER OF LYMPH NODES EXAMINED: 4   SIZE OF LARGEST POSITIVE NODE: Does not apply. EXTRA-CAPSULAR EXTENSION OF TUMOR: Does not apply. OTHER FINDINGS: Changes consistent with prior biopsy site.        LABORATORY:   Lab Results   Component Value Date/Time    Sodium 142 02/15/2018 10:59 AM    Potassium 4.0 02/15/2018 10:59 AM    Chloride 107 02/15/2018 10:59 AM    CO2 30 02/15/2018 10:59 AM    Anion gap 5 (L) 02/15/2018 10:59 AM    Glucose 83 02/15/2018 10:59 AM    BUN 11 02/15/2018 10:59 AM    Creatinine 1.00 02/15/2018 10:59 AM    GFR est AA >60 02/15/2018 10:59 AM    GFR est non-AA 59 (L) 02/15/2018 10:59 AM    Calcium 10.0 02/15/2018 10:59 AM    Magnesium 2.1 05/23/2017 08:40 AM     Lab Results   Component Value Date/Time    WBC 6.2 02/15/2018 10:59 AM    HGB 12.0 02/15/2018 10:59 AM    HCT 37.7 02/15/2018 10:59 AM    PLATELET 684 48/43/2073 10:59 AM       RADIOLOGY:    I have personally reviewed the imaging and agree with the reports below. BILATERAL SCREENING MAMMOGRAM, 11/15/2017.     CLINICAL HISTORY: Routine screening mammogram.     Comparison studies:  Screening mammograms 7/20/2016, and 7/15/2015.     FINDINGS:     Bilateral digital screening mammography, interpreted in conjunction with CAD  overread. The breasts are composed of scattered fibroglandular elements. The  axilla contain benign appearing lymph nodes. No evidence of evolving skin  thickening, or nipple retraction is seen. Scattered punctate, and round  appearing calcifications are seen which are not felt to be worrisome given their  scattered distribution and typically benign appearance. No evolving unique  clustered microcalcifications are seen to suggest a worrisome process. A new  posterior right breast mass is partially visualized. This is felt to reside at  approximately the 9:00 position of the right breast located 10 cm from the  nipple. Otherwise, no evolving dominant mass, spiculated density, or  architectural distortion is seen.     IMPRESSION  IMPRESSION:  1. New posterior right breast mass which is incompletely characterized. This  appears to be a true finding and not superimposed shadows. Therefore, further  evaluation with focused ultrasound is recommended.     We are mailing breast density information to the patient along with the  mammogram report.     A reminder letter will be scheduled at the appropriate time pending additional  views.     BI-RADS Assessment Category 0: Incomplete: Needs additional imaging evaluation. RIGHT BREAST ULTRASOUND, 11/17/2017.     CLINICAL HISTORY: Right breast abnormality seen on screening mammogram.     Technique: Grayscale, and Doppler imaging of the right breast soft tissues was  performed using a 12 MHz transducer.     FINDINGS:     An ultrasound abnormality is seen at the 6:00 position of the right breast  located 8 cm from the nipple felt to correspond to the prior mammogram  abnormality.  This was previously described at 9:00 based on an MLO view. The  location is felt to be more accurately described by ultrasound. At this level,  there is a hypoechoic area measuring 2.4 cm x 1 cm x 1.8 cm in size which occurs  at the level of a prior right breast scar. Although this very well may represent  benign scar tissue, this does appear to be a new finding by mammography and has  not been demonstrated on any prior mammogram dating as far back as June 2005. Therefore, this felt that this should be more definitively confirmed by breast  MRI.     IMPRESSION  IMPRESSION:   1. 2.4 cm x 1 cm x 1.8 cm hypoechoic abnormality at the level of a prior scar  which may represent scar tissue although should be further characterized given  that this does appear to have demonstrated evolution by mammography. This would  be best performed with a contrasted bilateral breast MRI.     A reminder letter will be scheduled at the appropriate time pending additional  views.     BI-RADS Assessment Category 0: Incomplete: Needs additional imaging evaluation. MRI BILATERAL BREASTS WITHOUT AND WITH CONTRAST, 11/28/2017.      CLINICAL HISTORY:  25-year-old with history of prior right breast cancer in 1999  treated with lumpectomy and radiation. Presents for further assessment of  ultrasound abnormality seen at level of prior scar. No current breast  complaints.     Technique: Multiplanar multisequence imaging of the breast were performed prior  to and following the uneventful intravenous administration of 15 mL of  Magnevist. Postcontrast imaging was performed using a dynamic technique.  Images  were reviewed using the [employer].      Sequences obtained: Sagittal T2, axial STIR, axial T1, and axial fat-saturated  T1-weighted images prior to and following administration of contrast.      Comparison studies: Breast MRI 11/13/2008, and bilateral mammogram 11/15/2017.      FINDINGS:   The breasts are composed of heterogeneous fibroglandular elements. No enlarged  axillary lymph nodes are seen. No enlarged internal mammary lymph nodes are  seen. Evaluation of the lungs is limited by  MRI imaging. However, no obvious  pulmonary masses are seen. No significant pleural effusions are seen. The liver  is obscured by cardiac motion artifact and only partially visualized limiting  assessment. Multiple T2 hyperintense lesions are seen. Similar lesions were  seen on the prior MRI study in these grossly do not appear to enhance following  contrast administration and therefore favored to represent benign cysts.      Following the administration of intravenous contrast, normal enhancement is seen  of the heart and vascular structures of the breasts. Asymmetric enhancement is  seen of the breasts with mild to moderate left, and no significant right  background enhancement. This is likely due to prior right breast radiation as  indicated in the patient's clinical history. At the 7:00 position of the right  breast centered 9.4 cm from the nipple there is an enhancing mass measuring 2.1  cm x 1.6 cm x 1.7 cm in size at the level of the patient's prior lumpectomy scar  which is highly concerning for locally recurrent disease. There is abnormal  enhancement seen in the directly adjacent pectoralis muscle best appreciated on  postcontrast image 200 consistent with pectoralis muscle involvement. No  dominant enhancing masses, or worrisome patterns of enhancement are otherwise  seen. Within again, there is asymmetric nodular enhancement in the left breast  without a dominant enhancing mass or segmental area of asymmetrically prominent  nonmasslike enhancement to suggest a worrisome process. No nodule definitely  demonstrating washout is demonstrated. No evidence of skin thickening, or  nipple retraction is seen.  No enhancing osseous lesion is seen in the visualized  chest.     IMPRESSION  IMPRESSION:  1.  2.1 cm x 1.6 cm x 1.7 cm enhancing mass at the level of the patient's right  breast lumpectomy bed concerning for locally recurrent malignancy. There is  evidence for involvement of the directly adjacent pectoralis muscle. Further  evaluation with ultrasound-guided biopsy is recommended given that a prior  diagnostic ultrasound has demonstrated an abnormal lesion at this level. No  additional enhancing abnormalities are seen to suggest potential multifocal or  multicentric disease.     2. No evidence for metastatic disease in the visualized chest.     BI-RADS Assessment Category 4: Suspicious Finding- Biopsy should be considered. IMPRESSION:  Wiley Severance is a 75 y.o. female with Stage IIA breast cancer. The natural history of breast cancer was again reviewed with the patient. Prognostic features including stage, performance status and presence or absence of lymph node involvement were reviewed with the patient. She had noted skeletal muscle involvement, but this is still not considered T4 based on the AJCC staging guidelines. Various treatment options including lumpectomy alone, breast conservation therapy, and mastectomy were compared and contrasted with regard to outcome and quality of life. With her tumor being greater than 2 cm, there was LVSI, and with the skeletal muscle invasion and area of close/positive margin at this location, I would offer post mastectomy radiation although I would only plan to treat the chest wall as opposed to the regional lymphatics as well. The practicalities, indications, benefits, side-effects and complications of radiation therapy were discussed. Skin changes fatigue, and potential for long-term complications including radiation pneumonitis, and rib fractures were among the complications highlighted. I have recommended a course of radiation therapy following mastectomy. The patient  asked numerous questions, which were answered to their satisfaction.   She completed expansion placement, and signed informed consenttoday. We will proceed with simulation. Plan:  1. Genetic testing-not indicated  2. Smoking cessation-not indicated  3. Patient will be simulated after full expansions or after chemotherapy depening on medical oncology's decision. A dose of 50 Gy to the chest wall alone to be followed by a boost to the scar. Portions of this note were copied from prior encounters and reviewed for accuracy, currency, and represent documentation and tasks completed during this encounter. I verify and attest these portions to be unchanged from prior visits.     Jose Donaldson MD  06/07/18

## 2018-06-07 NOTE — PROGRESS NOTES
Pt here today to complete the CT/Sim for a recurrence of her right breast cancer. Pt received RT in 1999 at [facility], and she also completed Tamoxifen. Pt has breast expanders in place. Her Oncotype score is 18.

## 2018-06-11 ENCOUNTER — HOSPITAL ENCOUNTER (OUTPATIENT)
Dept: RADIATION ONCOLOGY | Age: 68
Discharge: HOME | End: 2018-06-11
Payer: COMMERCIAL

## 2018-06-11 PROCEDURE — 77300 RADIATION THERAPY DOSE PLAN: CPT

## 2018-06-11 PROCEDURE — 77334 RADIATION TREATMENT AID(S): CPT

## 2018-06-11 PROCEDURE — 77295 3-D RADIOTHERAPY PLAN: CPT

## 2018-06-20 ENCOUNTER — HOSPITAL ENCOUNTER (OUTPATIENT)
Dept: RADIATION ONCOLOGY | Age: 68
Discharge: HOME | End: 2018-06-20
Payer: COMMERCIAL

## 2018-06-20 PROCEDURE — 77280 THER RAD SIMULAJ FIELD SMPL: CPT

## 2018-06-20 PROCEDURE — 77412 RADIATION TX DELIVERY LVL 3: CPT

## 2018-06-21 ENCOUNTER — HOSPITAL ENCOUNTER (OUTPATIENT)
Dept: RADIATION ONCOLOGY | Age: 68
Discharge: HOME | End: 2018-06-21
Payer: COMMERCIAL

## 2018-06-21 PROCEDURE — 77412 RADIATION TX DELIVERY LVL 3: CPT

## 2018-06-22 ENCOUNTER — HOSPITAL ENCOUNTER (OUTPATIENT)
Dept: RADIATION ONCOLOGY | Age: 68
Discharge: HOME | End: 2018-06-22
Payer: COMMERCIAL

## 2018-06-22 PROCEDURE — 77412 RADIATION TX DELIVERY LVL 3: CPT

## 2018-06-25 ENCOUNTER — HOSPITAL ENCOUNTER (OUTPATIENT)
Dept: RADIATION ONCOLOGY | Age: 68
Discharge: HOME | End: 2018-06-25
Payer: COMMERCIAL

## 2018-06-25 PROCEDURE — 77412 RADIATION TX DELIVERY LVL 3: CPT

## 2018-06-25 NOTE — PROGRESS NOTES
Patient: Natasha Robertson MRN: [id number]  SSN: [id number]    YOB: 1950  Age: 75 y.o. Sex: female      DIAGNOSIS:  Right breast invasive ductal carcinoma, [id number] with chest wall invasion, Stage IIA. ER 99%/AL 59% positive, HER-2 negative (0). Prior right sided breast cancer with lumpectomy and radiation (1999).    PREVIOUS TREATMENT:  1) 2/21/18:  Right breast mastectomy with sentinel node biopsy    TREATMENT SITE:  Right chest wall    DOSE and FRACTIONATION:  4/25 fractions, 800 cGy of 5000 cGy planned, 0/5 boost, 0 cGy of 1000 cGy planned. INTERVAL HISTORY:  Natasha Robertson is a 75 y.o. female being treated for right breast cancer. She was doing well without complaints week 1. OBJECTIVE:  No findings week 1. There were no vitals taken for this visit. Lab Results   Component Value Date/Time    Sodium 142 02/15/2018 10:59 AM    Potassium 4.0 02/15/2018 10:59 AM    Chloride 107 02/15/2018 10:59 AM    CO2 30 02/15/2018 10:59 AM    Anion gap 5 (L) 02/15/2018 10:59 AM    Glucose 83 02/15/2018 10:59 AM    BUN 11 02/15/2018 10:59 AM    Creatinine 1.00 02/15/2018 10:59 AM    GFR est AA >60 02/15/2018 10:59 AM    GFR est non-AA 59 (L) 02/15/2018 10:59 AM    Calcium 10.0 02/15/2018 10:59 AM    Magnesium 2.1 05/23/2017 08:40 AM     Lab Results   Component Value Date/Time    WBC 6.2 02/15/2018 10:59 AM    HGB 12.0 02/15/2018 10:59 AM    HCT 37.7 02/15/2018 10:59 AM    PLATELET 877 01/74/5860 10:59 AM       ASSESSMENT and PLAN:  Natasha Robertson is tolerating radiation as anticipated for the current dose and fraction. We will continue on as planned with another treatment visit anticipated next week.         Alf Keys MD   June 25, 2018

## 2018-06-26 ENCOUNTER — HOSPITAL ENCOUNTER (OUTPATIENT)
Dept: RADIATION ONCOLOGY | Age: 68
Discharge: HOME | End: 2018-06-26
Payer: COMMERCIAL

## 2018-06-26 PROCEDURE — 77336 RADIATION PHYSICS CONSULT: CPT

## 2018-06-26 PROCEDURE — 77412 RADIATION TX DELIVERY LVL 3: CPT

## 2018-06-27 ENCOUNTER — HOSPITAL ENCOUNTER (OUTPATIENT)
Dept: RADIATION ONCOLOGY | Age: 68
Discharge: HOME | End: 2018-06-27
Payer: COMMERCIAL

## 2018-06-27 PROCEDURE — 77417 THER RADIOLOGY PORT IMAGE(S): CPT

## 2018-06-27 PROCEDURE — 77412 RADIATION TX DELIVERY LVL 3: CPT

## 2018-06-28 ENCOUNTER — APPOINTMENT (OUTPATIENT)
Dept: RADIATION ONCOLOGY | Age: 68
End: 2018-06-28
Payer: COMMERCIAL

## 2018-06-29 ENCOUNTER — HOSPITAL ENCOUNTER (OUTPATIENT)
Dept: RADIATION ONCOLOGY | Age: 68
Discharge: HOME | End: 2018-06-29
Payer: COMMERCIAL

## 2018-06-29 PROCEDURE — 77412 RADIATION TX DELIVERY LVL 3: CPT

## 2018-07-02 ENCOUNTER — HOSPITAL ENCOUNTER (OUTPATIENT)
Dept: RADIATION ONCOLOGY | Age: 68
Discharge: HOME | End: 2018-07-02
Payer: COMMERCIAL

## 2018-07-02 PROCEDURE — 77412 RADIATION TX DELIVERY LVL 3: CPT

## 2018-07-02 NOTE — PROGRESS NOTES
Patient: Alf Albert MRN: [id number]  SSN: [id number]    YOB: 1950  Age: 75 y.o. Sex: female      DIAGNOSIS:  Right breast invasive ductal carcinoma, [id number] with chest wall invasion, Stage IIA. ER 99%/AL 59% positive, HER-2 negative (0). Prior right sided breast cancer with lumpectomy and radiation (1999).    PREVIOUS TREATMENT:  1) 2/21/18:  Right breast mastectomy with sentinel node biopsy    TREATMENT SITE:  Right chest wall    DOSE and FRACTIONATION:  8/25 fractions, 1600 cGy of 5000 cGy planned, 0/5 boost, 0 cGy of 1000 cGy planned. INTERVAL HISTORY:  Alf Albert is a 75 y.o. female being treated for right breast cancer. She was doing well without complaints week 1. Week 2 with some occasional chest discomfort and \"stinging. \"        OBJECTIVE:  No findings week 1. There were no vitals taken for this visit. Lab Results   Component Value Date/Time    Sodium 142 02/15/2018 10:59 AM    Potassium 4.0 02/15/2018 10:59 AM    Chloride 107 02/15/2018 10:59 AM    CO2 30 02/15/2018 10:59 AM    Anion gap 5 (L) 02/15/2018 10:59 AM    Glucose 83 02/15/2018 10:59 AM    BUN 11 02/15/2018 10:59 AM    Creatinine 1.00 02/15/2018 10:59 AM    GFR est AA >60 02/15/2018 10:59 AM    GFR est non-AA 59 (L) 02/15/2018 10:59 AM    Calcium 10.0 02/15/2018 10:59 AM    Magnesium 2.1 05/23/2017 08:40 AM     Lab Results   Component Value Date/Time    WBC 6.2 02/15/2018 10:59 AM    HGB 12.0 02/15/2018 10:59 AM    HCT 37.7 02/15/2018 10:59 AM    PLATELET 889 23/10/4522 10:59 AM       ASSESSMENT and PLAN:  Alf Albert is tolerating radiation as anticipated for the current dose and fraction. We will continue on as planned with another treatment visit anticipated next week.         Tyrone Lao MD   July 2, 2018

## 2018-07-03 ENCOUNTER — HOSPITAL ENCOUNTER (OUTPATIENT)
Dept: RADIATION ONCOLOGY | Age: 68
Discharge: HOME | End: 2018-07-03
Payer: COMMERCIAL

## 2018-07-03 PROCEDURE — 77412 RADIATION TX DELIVERY LVL 3: CPT

## 2018-07-05 ENCOUNTER — HOSPITAL ENCOUNTER (OUTPATIENT)
Dept: RADIATION ONCOLOGY | Age: 68
Discharge: HOME | End: 2018-07-05
Payer: COMMERCIAL

## 2018-07-05 PROCEDURE — 77412 RADIATION TX DELIVERY LVL 3: CPT

## 2018-07-06 ENCOUNTER — HOSPITAL ENCOUNTER (OUTPATIENT)
Dept: RADIATION ONCOLOGY | Age: 68
Discharge: HOME | End: 2018-07-06
Payer: COMMERCIAL

## 2018-07-06 PROCEDURE — 77417 THER RADIOLOGY PORT IMAGE(S): CPT

## 2018-07-06 PROCEDURE — 77412 RADIATION TX DELIVERY LVL 3: CPT

## 2018-07-06 PROCEDURE — 77336 RADIATION PHYSICS CONSULT: CPT

## 2018-07-09 ENCOUNTER — HOSPITAL ENCOUNTER (OUTPATIENT)
Dept: RADIATION ONCOLOGY | Age: 68
Discharge: HOME | End: 2018-07-09
Payer: COMMERCIAL

## 2018-07-09 PROCEDURE — 77412 RADIATION TX DELIVERY LVL 3: CPT

## 2018-07-09 NOTE — PROGRESS NOTES
Patient: Xochitl Sahni MRN: [id number]  SSN: [id number]    YOB: 1950  Age: 75 y.o. Sex: female      DIAGNOSIS:  Right breast invasive ductal carcinoma, [id number] with chest wall invasion, Stage IIA. ER 99%/ID 59% positive, HER-2 negative (0). Prior right sided breast cancer with lumpectomy and radiation (1999).    PREVIOUS TREATMENT:  1) 2/21/18:  Right breast mastectomy with sentinel node biopsy    TREATMENT SITE:  Right chest wall    DOSE and FRACTIONATION:  12/25 fractions, 2400 cGy of 5000 cGy planned, 0/5 boost, 0 cGy of 1000 cGy planned. INTERVAL HISTORY:  Xochitl Sahni is a 75 y.o. female being treated for right breast cancer. She was doing well without complaints week 1. Week 2 with some occasional chest discomfort and \"stinging. \" No change week 3. OBJECTIVE:  No findings week 1. There were no vitals taken for this visit. Lab Results   Component Value Date/Time    Sodium 142 02/15/2018 10:59 AM    Potassium 4.0 02/15/2018 10:59 AM    Chloride 107 02/15/2018 10:59 AM    CO2 30 02/15/2018 10:59 AM    Anion gap 5 (L) 02/15/2018 10:59 AM    Glucose 83 02/15/2018 10:59 AM    BUN 11 02/15/2018 10:59 AM    Creatinine 1.00 02/15/2018 10:59 AM    GFR est AA >60 02/15/2018 10:59 AM    GFR est non-AA 59 (L) 02/15/2018 10:59 AM    Calcium 10.0 02/15/2018 10:59 AM    Magnesium 2.1 05/23/2017 08:40 AM     Lab Results   Component Value Date/Time    WBC 6.2 02/15/2018 10:59 AM    HGB 12.0 02/15/2018 10:59 AM    HCT 37.7 02/15/2018 10:59 AM    PLATELET 540 60/87/9171 10:59 AM       ASSESSMENT and PLAN:  Xochitl Sahni is tolerating radiation as anticipated for the current dose and fraction. We will continue on as planned with another treatment visit anticipated next week.         Dinora Witt MD   July 9, 2018

## 2018-07-10 ENCOUNTER — HOSPITAL ENCOUNTER (OUTPATIENT)
Dept: RADIATION ONCOLOGY | Age: 68
Discharge: HOME | End: 2018-07-10
Payer: COMMERCIAL

## 2018-07-10 ENCOUNTER — HOSPITAL ENCOUNTER (OUTPATIENT)
Dept: LAB | Age: 68
Discharge: HOME | End: 2018-07-10
Payer: COMMERCIAL

## 2018-07-10 DIAGNOSIS — Z17.0 MALIGNANT NEOPLASM OF RIGHT BREAST IN FEMALE, ESTROGEN RECEPTOR POSITIVE, UNSPECIFIED SITE OF BREAST (HCC): ICD-10-CM

## 2018-07-10 DIAGNOSIS — C50.911 MALIGNANT NEOPLASM OF RIGHT BREAST IN FEMALE, ESTROGEN RECEPTOR POSITIVE, UNSPECIFIED SITE OF BREAST (HCC): ICD-10-CM

## 2018-07-10 LAB
ALBUMIN SERPL-MCNC: 3.6 G/DL (ref 3.2–4.6)
ALBUMIN/GLOB SERPL: 0.9 {RATIO} (ref 1.2–3.5)
ALP SERPL-CCNC: 81 U/L (ref 50–136)
ALT SERPL-CCNC: 25 U/L (ref 12–65)
ANION GAP SERPL CALC-SCNC: 3 MMOL/L (ref 7–16)
AST SERPL-CCNC: 12 U/L (ref 15–37)
BASOPHILS # BLD: 0 K/UL (ref 0–0.2)
BASOPHILS NFR BLD: 1 % (ref 0–2)
BILIRUB SERPL-MCNC: 0.3 MG/DL (ref 0.2–1.1)
BUN SERPL-MCNC: 12 MG/DL (ref 8–23)
CALCIUM SERPL-MCNC: 9.8 MG/DL (ref 8.3–10.4)
CHLORIDE SERPL-SCNC: 110 MMOL/L (ref 98–107)
CO2 SERPL-SCNC: 26 MMOL/L (ref 21–32)
CREAT SERPL-MCNC: 1.12 MG/DL (ref 0.6–1)
DIFFERENTIAL METHOD BLD: ABNORMAL
EOSINOPHIL # BLD: 0 K/UL (ref 0–0.8)
EOSINOPHIL NFR BLD: 1 % (ref 0.5–7.8)
ERYTHROCYTE [DISTWIDTH] IN BLOOD BY AUTOMATED COUNT: 15.5 % (ref 11.9–14.6)
GLOBULIN SER CALC-MCNC: 3.9 G/DL (ref 2.3–3.5)
GLUCOSE SERPL-MCNC: 86 MG/DL (ref 65–100)
HCT VFR BLD AUTO: 37 % (ref 35.8–46.3)
HGB BLD-MCNC: 11.4 G/DL (ref 11.7–15.4)
LYMPHOCYTES # BLD: 1.3 K/UL (ref 0.5–4.6)
LYMPHOCYTES NFR BLD: 23 % (ref 13–44)
MCH RBC QN AUTO: 24.1 PG (ref 26.1–32.9)
MCHC RBC AUTO-ENTMCNC: 30.8 G/DL (ref 31.4–35)
MCV RBC AUTO: 78.1 FL (ref 79.6–97.8)
MONOCYTES # BLD: 0.9 K/UL (ref 0.1–1.3)
MONOCYTES NFR BLD: 15 % (ref 4–12)
NEUTS SEG # BLD: 3.4 K/UL (ref 1.7–8.2)
NEUTS SEG NFR BLD: 60 % (ref 43–78)
NRBC # BLD: 0 K/UL (ref 0–0.2)
PLATELET # BLD AUTO: 200 K/UL (ref 150–450)
PMV BLD AUTO: 8.8 FL (ref 10.8–14.1)
POTASSIUM SERPL-SCNC: 3.9 MMOL/L (ref 3.5–5.1)
PROT SERPL-MCNC: 7.5 G/DL (ref 6.3–8.2)
RBC # BLD AUTO: 4.74 M/UL (ref 4.05–5.25)
SODIUM SERPL-SCNC: 139 MMOL/L (ref 136–145)
WBC # BLD AUTO: 5.7 K/UL (ref 4.3–11.1)

## 2018-07-10 PROCEDURE — 77412 RADIATION TX DELIVERY LVL 3: CPT

## 2018-07-10 PROCEDURE — 36415 COLL VENOUS BLD VENIPUNCTURE: CPT | Performed by: INTERNAL MEDICINE

## 2018-07-10 PROCEDURE — 80053 COMPREHEN METABOLIC PANEL: CPT | Performed by: INTERNAL MEDICINE

## 2018-07-10 PROCEDURE — 85025 COMPLETE CBC W/AUTO DIFF WBC: CPT | Performed by: INTERNAL MEDICINE

## 2018-07-11 ENCOUNTER — HOSPITAL ENCOUNTER (OUTPATIENT)
Dept: RADIATION ONCOLOGY | Age: 68
Discharge: HOME | End: 2018-07-11
Payer: COMMERCIAL

## 2018-07-11 PROCEDURE — 77412 RADIATION TX DELIVERY LVL 3: CPT

## 2018-07-12 ENCOUNTER — HOSPITAL ENCOUNTER (OUTPATIENT)
Dept: RADIATION ONCOLOGY | Age: 68
Discharge: HOME | End: 2018-07-12
Payer: COMMERCIAL

## 2018-07-12 PROCEDURE — 77412 RADIATION TX DELIVERY LVL 3: CPT

## 2018-07-13 ENCOUNTER — HOSPITAL ENCOUNTER (OUTPATIENT)
Dept: RADIATION ONCOLOGY | Age: 68
Discharge: HOME | End: 2018-07-13
Payer: COMMERCIAL

## 2018-07-13 PROCEDURE — 77412 RADIATION TX DELIVERY LVL 3: CPT

## 2018-07-13 PROCEDURE — 77417 THER RADIOLOGY PORT IMAGE(S): CPT

## 2018-07-16 ENCOUNTER — HOSPITAL ENCOUNTER (OUTPATIENT)
Dept: RADIATION ONCOLOGY | Age: 68
Discharge: HOME | End: 2018-07-16
Payer: COMMERCIAL

## 2018-07-16 PROCEDURE — 77336 RADIATION PHYSICS CONSULT: CPT

## 2018-07-16 PROCEDURE — 77412 RADIATION TX DELIVERY LVL 3: CPT

## 2018-07-16 NOTE — PROGRESS NOTES
Patient: Kyle Aponte MRN: [id number]  SSN: [id number]    YOB: 1950  Age: 75 y.o. Sex: female      DIAGNOSIS:  Right breast invasive ductal carcinoma, [id number] with chest wall invasion, Stage IIA. ER 99%/HI 59% positive, HER-2 negative (0). Prior right sided breast cancer with lumpectomy and radiation (1999).    PREVIOUS TREATMENT:  1) 2/21/18:  Right breast mastectomy with sentinel node biopsy    TREATMENT SITE:  Right chest wall    DOSE and FRACTIONATION:  17/25 fractions, 3400 cGy of 5000 cGy planned, 0/5 boost, 0 cGy of 1000 cGy planned. INTERVAL HISTORY:  Kyle Aponte is a 75 y.o. female being treated for right breast cancer. She was doing well without complaints week 1. Week 2 with some occasional chest discomfort and \"stinging. \" No change week 3-4      OBJECTIVE:  No findings week 1. There were no vitals taken for this visit. Lab Results   Component Value Date/Time    Sodium 139 07/10/2018 10:44 AM    Potassium 3.9 07/10/2018 10:44 AM    Chloride 110 (H) 07/10/2018 10:44 AM    CO2 26 07/10/2018 10:44 AM    Anion gap 3 (L) 07/10/2018 10:44 AM    Glucose 86 07/10/2018 10:44 AM    BUN 12 07/10/2018 10:44 AM    Creatinine 1.12 (H) 07/10/2018 10:44 AM    GFR est AA >60 07/10/2018 10:44 AM    GFR est non-AA 51 (L) 07/10/2018 10:44 AM    Calcium 9.8 07/10/2018 10:44 AM    Magnesium 2.1 05/23/2017 08:40 AM    Albumin 3.6 07/10/2018 10:44 AM    Protein, total 7.5 07/10/2018 10:44 AM    Globulin 3.9 (H) 07/10/2018 10:44 AM    A-G Ratio 0.9 (L) 07/10/2018 10:44 AM    AST (SGOT) 12 (L) 07/10/2018 10:44 AM    ALT (SGPT) 25 07/10/2018 10:44 AM     Lab Results   Component Value Date/Time    WBC 5.7 07/10/2018 10:44 AM    HGB 11.4 (L) 07/10/2018 10:44 AM    HCT 37.0 07/10/2018 10:44 AM    PLATELET 611 61/18/6858 10:44 AM       ASSESSMENT and PLAN:  Kyle Aponte is tolerating radiation as anticipated for the current dose and fraction.   We will continue on as planned with another treatment visit anticipated next week.         Inga Harris MD   July 16, 2018

## 2018-07-17 ENCOUNTER — HOSPITAL ENCOUNTER (OUTPATIENT)
Dept: RADIATION ONCOLOGY | Age: 68
Discharge: HOME | End: 2018-07-17
Payer: COMMERCIAL

## 2018-07-17 PROCEDURE — 77412 RADIATION TX DELIVERY LVL 3: CPT

## 2018-07-18 ENCOUNTER — HOSPITAL ENCOUNTER (OUTPATIENT)
Dept: RADIATION ONCOLOGY | Age: 68
Discharge: HOME | End: 2018-07-18
Payer: COMMERCIAL

## 2018-07-18 PROCEDURE — 77412 RADIATION TX DELIVERY LVL 3: CPT

## 2018-07-19 ENCOUNTER — HOSPITAL ENCOUNTER (OUTPATIENT)
Dept: RADIATION ONCOLOGY | Age: 68
Discharge: HOME | End: 2018-07-19
Payer: COMMERCIAL

## 2018-07-19 PROCEDURE — 77290 THER RAD SIMULAJ FIELD CPLX: CPT

## 2018-07-19 PROCEDURE — 77334 RADIATION TREATMENT AID(S): CPT

## 2018-07-19 PROCEDURE — 77412 RADIATION TX DELIVERY LVL 3: CPT

## 2018-07-23 ENCOUNTER — HOSPITAL ENCOUNTER (OUTPATIENT)
Dept: RADIATION ONCOLOGY | Age: 68
Discharge: HOME | End: 2018-07-23
Payer: COMMERCIAL

## 2018-07-23 PROCEDURE — 77412 RADIATION TX DELIVERY LVL 3: CPT

## 2018-07-23 NOTE — PROGRESS NOTES
Patient: Evangelina Swanson MRN: [id number]  SSN: [id number]    YOB: 1950  Age: 75 y.o. Sex: female      DIAGNOSIS:  Right breast invasive ductal carcinoma, [id number] with chest wall invasion, Stage IIA. ER 99%/MN 59% positive, HER-2 negative (0). Prior right sided breast cancer with lumpectomy and radiation (1999).    PREVIOUS TREATMENT:  1) 2/21/18:  Right breast mastectomy with sentinel node biopsy    TREATMENT SITE:  Right chest wall    DOSE and FRACTIONATION:  22/25 fractions, 4400 cGy of 5000 cGy planned, 0/5 boost, 0 cGy of 1000 cGy planned. INTERVAL HISTORY:  Evangelina Swanson is a 75 y.o. female being treated for right breast cancer. She was doing well without complaints week 1. Week 2 with some occasional chest discomfort and \"stinging. \" No change week 3-5. OBJECTIVE:  No findings week 1. There were no vitals taken for this visit. Lab Results   Component Value Date/Time    Sodium 139 07/10/2018 10:44 AM    Potassium 3.9 07/10/2018 10:44 AM    Chloride 110 (H) 07/10/2018 10:44 AM    CO2 26 07/10/2018 10:44 AM    Anion gap 3 (L) 07/10/2018 10:44 AM    Glucose 86 07/10/2018 10:44 AM    BUN 12 07/10/2018 10:44 AM    Creatinine 1.12 (H) 07/10/2018 10:44 AM    GFR est AA >60 07/10/2018 10:44 AM    GFR est non-AA 51 (L) 07/10/2018 10:44 AM    Calcium 9.8 07/10/2018 10:44 AM    Magnesium 2.1 05/23/2017 08:40 AM    Albumin 3.6 07/10/2018 10:44 AM    Protein, total 7.5 07/10/2018 10:44 AM    Globulin 3.9 (H) 07/10/2018 10:44 AM    A-G Ratio 0.9 (L) 07/10/2018 10:44 AM    AST (SGOT) 12 (L) 07/10/2018 10:44 AM    ALT (SGPT) 25 07/10/2018 10:44 AM     Lab Results   Component Value Date/Time    WBC 5.7 07/10/2018 10:44 AM    HGB 11.4 (L) 07/10/2018 10:44 AM    HCT 37.0 07/10/2018 10:44 AM    PLATELET 688 53/99/8671 10:44 AM       ASSESSMENT and PLAN:  Evangelina Swanson is tolerating radiation as anticipated for the current dose and fraction.   We will continue on as planned with another treatment visit anticipated next week.         Toby Novoa MD   July 23, 2018

## 2018-07-24 ENCOUNTER — HOSPITAL ENCOUNTER (OUTPATIENT)
Dept: RADIATION ONCOLOGY | Age: 68
Discharge: HOME | End: 2018-07-24
Payer: COMMERCIAL

## 2018-07-24 PROCEDURE — 77412 RADIATION TX DELIVERY LVL 3: CPT

## 2018-07-25 ENCOUNTER — HOSPITAL ENCOUNTER (OUTPATIENT)
Dept: RADIATION ONCOLOGY | Age: 68
Discharge: HOME | End: 2018-07-25
Payer: COMMERCIAL

## 2018-07-25 PROCEDURE — 77412 RADIATION TX DELIVERY LVL 3: CPT

## 2018-07-25 PROCEDURE — 77300 RADIATION THERAPY DOSE PLAN: CPT

## 2018-07-26 ENCOUNTER — HOSPITAL ENCOUNTER (OUTPATIENT)
Dept: RADIATION ONCOLOGY | Age: 68
Discharge: HOME | End: 2018-07-26
Payer: COMMERCIAL

## 2018-07-26 PROCEDURE — 77412 RADIATION TX DELIVERY LVL 3: CPT

## 2018-07-27 ENCOUNTER — HOSPITAL ENCOUNTER (OUTPATIENT)
Dept: RADIATION ONCOLOGY | Age: 68
Discharge: HOME | End: 2018-07-27
Payer: COMMERCIAL

## 2018-07-27 PROCEDURE — 77412 RADIATION TX DELIVERY LVL 3: CPT

## 2018-07-27 NOTE — PROGRESS NOTES
Patient: Sue Fontenot MRN: [id number]  SSN: [id number] YOB: 1950  Age: 75 y.o. Sex: female DIAGNOSIS:  Right breast invasive ductal carcinoma, [id number] with chest wall invasion, Stage IIA. ER 99%/DE 59% positive, HER-2 negative (0). Prior right sided breast cancer with lumpectomy and radiation (1999).   
PREVIOUS TREATMENT: 
1) 2/21/18:  Right breast mastectomy with sentinel node biopsy TREATMENT SITE:  Right chest wall DOSE and FRACTIONATION:  25/25 fractions, 5000 cGy of 5000 cGy planned, 1/5 boost, 200 cGy of 1000 cGy planned. INTERVAL HISTORY:  Sue Fontenot is a 75 y.o. female being treated for right breast cancer. She was doing well without complaints week 1. Week 2 with some occasional chest discomfort and \"stinging. \" No change week 3-5. Some axillary irritation final week. OBJECTIVE:  No findings week 1. There were no vitals taken for this visit. Lab Results Component Value Date/Time Sodium 139 07/10/2018 10:44 AM  
 Potassium 3.9 07/10/2018 10:44 AM  
 Chloride 110 (H) 07/10/2018 10:44 AM  
 CO2 26 07/10/2018 10:44 AM  
 Anion gap 3 (L) 07/10/2018 10:44 AM  
 Glucose 86 07/10/2018 10:44 AM  
 BUN 12 07/10/2018 10:44 AM  
 Creatinine 1.12 (H) 07/10/2018 10:44 AM  
 GFR est AA >60 07/10/2018 10:44 AM  
 GFR est non-AA 51 (L) 07/10/2018 10:44 AM  
 Calcium 9.8 07/10/2018 10:44 AM  
 Magnesium 2.1 05/23/2017 08:40 AM  
 Albumin 3.6 07/10/2018 10:44 AM  
 Protein, total 7.5 07/10/2018 10:44 AM  
 Globulin 3.9 (H) 07/10/2018 10:44 AM  
 A-G Ratio 0.9 (L) 07/10/2018 10:44 AM  
 AST (SGOT) 12 (L) 07/10/2018 10:44 AM  
 ALT (SGPT) 25 07/10/2018 10:44 AM  
 
Lab Results Component Value Date/Time WBC 5.7 07/10/2018 10:44 AM  
 HGB 11.4 (L) 07/10/2018 10:44 AM  
 HCT 37.0 07/10/2018 10:44 AM  
 PLATELET 594 41/10/1058 10:44 AM  
 
 
ASSESSMENT and PLAN:  Sue Fontenot is tolerating radiation as anticipated for the current dose and fraction.   We will continue on as planned with with treatment to be completed next Thursday. Will plan for 1 month follow up. Torsten Max MD  
July 27, 2018

## 2018-07-30 ENCOUNTER — HOSPITAL ENCOUNTER (OUTPATIENT)
Dept: RADIATION ONCOLOGY | Age: 68
Discharge: HOME | End: 2018-07-30
Payer: COMMERCIAL

## 2018-07-30 PROCEDURE — 77336 RADIATION PHYSICS CONSULT: CPT

## 2018-07-30 PROCEDURE — 77412 RADIATION TX DELIVERY LVL 3: CPT

## 2018-07-31 ENCOUNTER — HOSPITAL ENCOUNTER (OUTPATIENT)
Dept: RADIATION ONCOLOGY | Age: 68
Discharge: HOME | End: 2018-07-31
Payer: COMMERCIAL

## 2018-07-31 PROCEDURE — 77412 RADIATION TX DELIVERY LVL 3: CPT

## 2018-08-01 ENCOUNTER — HOSPITAL ENCOUNTER (OUTPATIENT)
Dept: RADIATION ONCOLOGY | Age: 68
Discharge: HOME | End: 2018-08-01
Payer: COMMERCIAL

## 2018-08-01 PROCEDURE — 77412 RADIATION TX DELIVERY LVL 3: CPT

## 2018-08-02 ENCOUNTER — HOSPITAL ENCOUNTER (OUTPATIENT)
Dept: RADIATION ONCOLOGY | Age: 68
Discharge: HOME | End: 2018-08-02
Payer: COMMERCIAL

## 2018-08-02 PROCEDURE — 77412 RADIATION TX DELIVERY LVL 3: CPT

## 2018-09-06 ENCOUNTER — HOSPITAL ENCOUNTER (OUTPATIENT)
Dept: RADIATION ONCOLOGY | Age: 68
Discharge: HOME | End: 2018-09-06
Payer: COMMERCIAL

## 2018-09-06 VITALS
DIASTOLIC BLOOD PRESSURE: 73 MMHG | TEMPERATURE: 98.1 F | BODY MASS INDEX: 31.62 KG/M2 | SYSTOLIC BLOOD PRESSURE: 134 MMHG | WEIGHT: 171.8 LBS | HEIGHT: 62 IN | RESPIRATION RATE: 18 BRPM | HEART RATE: 66 BPM | OXYGEN SATURATION: 100 %

## 2018-09-06 PROCEDURE — 99211 OFF/OP EST MAY X REQ PHY/QHP: CPT

## 2018-09-06 NOTE — PROGRESS NOTES
Pt here today for FUP post RT to the right breast which ended 8/2/18. Pt is s/p a right mastectomy. She has a history of having a right lumpectomy and 5 years of Tamoxifen through [facility]. A referral to Onc Rehab was made to evaluate pt for a Sleeve, and to assist with exercises. Pt will return in 6 months for FUP.

## 2018-09-06 NOTE — PROGRESS NOTES
Patient: Xochitl Sahni MRN: [id number]  SSN: [id number] YOB: 1950  Age: 75 y.o. Sex: female Other Providers:  Etienne Page MD 
 
CHIEF COMPLAINT: Breast cancer DIAGNOSIS: Right breast invasive ductal carcinoma, [id number] with chest wall invasion, Stage IIA. ER 99%/IN 59% positive, HER-2 negative (0). Prior right sided breast cancer with lumpectomy and radiation (1999). PREVIOUS TREATMENT: 
1) 2/21/18:  Right breast mastectomy with sentinel node biopsy 2) Radiation of the right chest wall. DOSE:  5000 cGy in 25 fractions right chest wall, 1000 cGy in 5 fractions right medial chest wall boost, 1000 cGy in 5 fractions right lateral chest wall boost. Total 7000 cGy. TREATMENT DATES:  6/20/2018 - 8/2/2018. HISTORY OF PRESENT ILLNESS:  Xochitl Sahni is a 75 y.o. female who was initially seen by Dr. Tanika Bravo at the request of Dr. Ayla Alfaro. She has a history of lumpectomy in 1999 for breast cancer which was treated adjuvantly with radiation and 5 years of tamoxifen at [facility]. She presented for routine screening mammogram on 11/15/17 which identified a new, incompletely characterized, posterior right breast mass. Right breast ultrasound on 11/17/18 confirmed a 2.4 cm x 1 cm x 1.8 cm hypoechoic abnormality at the level of a prior scar. MRI 11/28/17 demonstrated a 2.1 cm x 1.6 cm x 1.7 cm enhancing mass at the level of the patient's right breast lumpectomy bed concerning for locally recurrent malignancy with evidence for involvement of the directly adjacent pectoralis muscle. Biopsy showed grade 1 IDC with lobular features, ER 99%/IN 59% positive, HER-2 negative (0), low grade, well differentiated, infiltrating duct carcinoma with lobular features in association with ductal carcinoma in situ, intermediate grade (cribriform type), with no definite lymphovascular invasion identified.   Secondary to her previous right breast cancer and treatment with radiation, patient was recommended for mastectomy and sentinel node biopsy completed 2/21/18. This showed the tumor to be 2.4 cm in greatest dimension with carcinoma less than 0.1 cm from marked deep margin (invading skeletal muscle). All 4 nodes were negative for metastatic carcinoma. She was referred to oncology for evaluation and treatment of her newly diagnosed right breast cancer and saw Dr. Sudeep Capone 3/5/18 who recommended Oncotype for consideration of chemotherapy and an AI. She is completing expansions with Dr. Ed Mon and had drains in place at time of initial consultation. INTERVAL HISTORY: Ms Henry Garcia returns today 1 month after completing radiation therapy of the right chest wall. She tolerated radiation fairly well. Continues with some breast tenderness. Energy continues to improve. Tolerating Arimidex under the direction of Dr. Sudeep Capone. OBSTETRIC HISTORY:   
Menarche at the age of 14.   
G4,P2. Oral Contraceptive Pills:  7 years Hormone Replacement Therapy:  \"Briefly\" Hysterectomy in 1979. PAST MEDICAL HISTORY:   
Past Medical History:  
Diagnosis Date  Abnormal results of thyroid function studies  Anemia  Anxiety  BPV (benign positional vertigo)  Breast cancer ([address] 1999  
 right---radiation and 5 years of tamoxifen--lumpectomy  Breast cancer ([facility] 2018  
 right  Chronic depression  Chronic tension headache  Diabetes mellitus type II, controlled ([address]   
 saxenda- bs: does not check blood sugars.  Elevated glucose  Encounter for long-term (current) use of high-risk medication  Encounter for long-term (current) use of medications  Fatigue  Heart palpitations  Hematuria  Hot flashes, menopausal   
 Hyperlipidemia  Hypertension, benign  Insomnia  Menopausal disorder  Microcytosis  Obesity The patient denies history of collagen vascular diseases, pacemaker insertion, prior radiation or prior chemotherapy. PAST SURGICAL HISTORY:  
Past Surgical History:  
Procedure Laterality Date New Maciel Erwin  HX BREAST RECONSTRUCTION Right 2/21/2018 BREAST RECONSTRUCTION RIGHT performed by nAna Lagunas MD at [facility] MA[facility] OR  
 HX CATARACT REMOVAL Left 10/2017  
  with IOL  HX CATARACT REMOVAL Right 11/2017  
 with IOL  HX MASTECTOMY Right 2/21/2018 BREAST MASTECTOMY SIMPLE RIGHT performed by Sandee Martínez MD at [address] w/ ovaries MEDICATIONS:  
 
Current Outpatient Prescriptions:  
  anastrozole (ARIMIDEX) 1 mg tablet, Take 1 Tab by mouth daily. Indications: Hormone Receptor Positive Breast Cancer, Disp: 90 Tab, Rfl: 3 
  nebivolol (BYSTOLIC) 5 mg tablet, Take 1 Tab by mouth daily. Indications: hypertension (Patient taking differently: Take 5 mg by mouth daily. Indications: hypertension, am- take on the dos), Disp: 90 Tab, Rfl: 3 
  ALPRAZolam (XANAX) 0.5 mg tablet, Take 1 Tab by mouth nightly. Max Daily Amount: 0.5 mg. 1/2 to 1 for insomnia (Patient taking differently: Take 0.5 mg by mouth nightly as needed. 1/2 to 1 for insomnia  Indications: anxiety), Disp: 90 Tab, Rfl: 1   venlafaxine (EFFEXOR) 75 mg tablet, Take 1 Tab by mouth two (2) times a day. (Patient taking differently: Take 75 mg by mouth two (2) times a day. Indications: VASOMOTOR SYMPTOMS ASSOCIATED WITH MENOPAUSE, take on the dos), Disp: 180 Tab, Rfl: 3 
  spironolactone (ALDACTONE) 25 mg tablet, Take 1 Tab by mouth daily. (Patient taking differently: Take 25 mg by mouth daily. Indications: am), Disp: 90 Tab, Rfl: 3 
  liraglutide (SAXENDA) 3 mg/0.5 mL (18 mg/3 mL) pen, 0.5 mL by SubCUTAneous route daily. (Patient taking differently: 3 mg by SubCUTAneous route every evening. Indications: takes for diabetes management), Disp: 15 Pen, Rfl: 3   Insulin Needles, Disposable, (ARIANA PEN NEEDLE) 32 gauge x 5/32\" ndle, 1 Pen Needle by Does Not Apply route daily. , Disp: 90 Pen Needle, Rfl: 3 
  amLODIPine (NORVASC) 5 mg tablet, Take 1 Tab by mouth daily. (Patient taking differently: Take 5 mg by mouth daily. Indications: am- take on the dos), Disp: 90 Tab, Rfl: 3 
  butalbital-acetaminophen-caffeine (FIORICET, ESGIC) -40 mg per tablet, Take 1-2 Tabs by mouth four (4) times daily as needed for Pain. Max Daily Amount: 8 Tabs. (Patient taking differently: Take 1-2 Tabs by mouth four (4) times daily as needed for Pain. Indications: Migraine), Disp: 30 Tab, Rfl: 1 
  fluticasone (FLONASE) 50 mcg/actuation nasal spray, 2 Sprays by Both Nostrils route daily. (Patient taking differently: 2 Sprays by Both Nostrils route daily as needed. Indications: Allergic Rhinitis), Disp: 1 Bottle, Rfl: 0 
  aspirin 81 mg chewable tablet, Take 81 mg by mouth daily. Indications: myocardial infarction prevention, am- hold until after surgery, Disp: , Rfl:  
  Calcium-Vitamin D3-Vitamin K (VIACTIV) 540-226-37 mg-unit-mcg chew, Take 1 Tab by mouth two (2) times a day. Indications: HYPOCALCEMIA PREVENTION, hold until after surgery, Disp: , Rfl: ALLERGIES:  
Allergies Allergen Reactions  Ambien [Zolpidem] Other (comments) Makes her dream  
 Buspar [Buspirone] Vertigo  Iron Other (comments)  
  constipation  Macrodantin [Nitrofurantoin Macrocrystalline] Unknown (comments)  Milk Other (comments) Causes stomach problems SOCIAL HISTORY:  
Social History Social History  Marital status:  Spouse name: N/A  
 Number of children: N/A  
 Years of education: N/A Occupational History  Not on file. Social History Main Topics  Smoking status: Never Smoker  Smokeless tobacco: Never Used  Alcohol use No  
 Drug use: No  
 Sexual activity: Not Currently Other Topics Concern  Not on file Social History Narrative FAMILY HISTORY:  
Family History Problem Relation Age of Onset  Uterine Cancer Mother  Hypertension Mother  Stroke Father  Cancer Maternal Grandfather Lung  No Known Problems Paternal Grandmother  No Known Problems Paternal Grandfather REVIEW OF SYSTEMS: A full 12 point review of systems was completed and was negative unless noted in the history of present illness. PHYSICAL EXAMINATION:  
ECOG Performance status 0 
VITAL SIGNS: blood pressure  134/73  Pulse 66  Temperature 98.1  Weight 171.8 GENERAL: The patient is well-developed, ambulatory, alert and in no acute distress. LYMPHATIC: There is no cervical, supraclavicular or axillary lymphadenopathy bilaterally. BREASTS: Examination of the unaffected breast reveals no dominant nodules or masses. BREAST: Examination of the right chest wall/reconstructed breast shows tanning of the skin with healed skin incisions. PATHOLOGY:   
2/21/18:   
 DIAGNOSIS  
A: RIGHT MASTECTOMY: INFILTRATING DUCT CARCINOMA WITH LOBULAR AND MICROPAPILLARY FEATURES, INTERMEDIATE GRADE (MODERATELY DIFFERENTIATED) MEASURING APPROXIMATELY 2.4 X 2.1 X 1.7 CM. AREA SUSPICIOUS FOR LYMPHOVASCULAR INVASION. INFILTRATING CARCINOMA IS PRESENT LESS THAN 0.1 CM FROM MARKED DEEP MARGIN (INVADING SKELETAL MUSCLE). OTHER MARGINS ARE GREATER THAN 1 CM FROM TUMOR. DEFINITE IN SITU COMPONENT IS NOT IDENTIFIED. CHANGES CONSISTENT WITH PRIOR BIOPSY SITE (SEE [id number]). SEE COMMENT. B: RIGHT SENTINEL LYMPH NODE #1: TWO LYMPH NODES NEGATIVE FOR METASTATIC TUMOR. C: RIGHT SENTINEL LYMPH NODE #2: LYMPH NODE NEGATIVE FOR METASTATIC CARCINOMA. D: RIGHT NONSENTINEL LYMPH NODE: ADIPOSE TISSUE WITHOUT IDENTIFIABLE LYMPH NODE. E: RIGHT SENTINEL LYMPH NODE #3: LYMPH NODE NEGATIVE FOR METASTATIC CARCINOMA. Comment Infiltrating carcinoma in this specimen is consistent with that present on prior core biopsy, [id number], which has estrogen receptors of 99%, progesterone receptors of 59% and Her-2 negative. If clinically indicated, receptor studies can be repeated in this material.  
 
 A: ANATOMIC SITE: Right breast (presumably 6 o'clock position, 8 cm from nipple based on prior core biopsy). PROCEDURE: Mastectomy and sentinel node excision. HISTOLOGIC TYPE: Infiltrating duct carcinoma with lobular and micropapillary features. SIZE: Approximately 2.4 x 2.1 x 1.7 cm. UNIFOCAL OR MULTIFOCAL: Apparently unifocal.  
PRESENCE OF SKIN, NIPPLE OR SKELETAL MUSCLE INVOLVEMENT: Skeletal muscle is involved in area of deep margin. RITCHIE MODIFICATION OF BLOOM-LOPEZ GRADE:  
ARCHITECTURAL SCORE: 2/3 NUCLEAR SCORE: 3/3 MITOTIC SCORE: 2/3 TOTAL SCORE: 7/9 = Intermediate grade. IN-SITU COMPONENT: Not definitely identified. LYMPHOVASCULAR INVASION: Area suspicious for lymphovascular invasion. ARE MICROCALCIFICATIONS IDENTIFIED: Yes.  
TUMOR DISTANCE TO CLOSEST MARGIN: Deep margin less than 0.1 cm. ANTERIOR (SUPERFICIAL): Greater than 1 cm. POSTERIOR (DEEP): Less than 0.1 cm. MEDIAL: Greater than 1 cm. LATERAL: Greater than 1 cm. INFERIOR: Greater than 1 cm. SUPERIOR: Greater than 1 cm. LYMPH NODE STATUS:  
TOTAL NUMBER OF LYMPH NODES CONTAINING CARCINOMA: 0  
TOTAL NUMBER OF LYMPH NODES EXAMINED: 4 SIZE OF LARGEST POSITIVE NODE: Does not apply. EXTRA-CAPSULAR EXTENSION OF TUMOR: Does not apply. OTHER FINDINGS: Changes consistent with prior biopsy site. LABORATORY:  
Lab Results Component Value Date/Time  Sodium 139 07/10/2018 10:44 AM  
 Potassium 3.9 07/10/2018 10:44 AM  
 Chloride 110 (H) 07/10/2018 10:44 AM  
 CO2 26 07/10/2018 10:44 AM  
 Anion gap 3 (L) 07/10/2018 10:44 AM  
 Glucose 86 07/10/2018 10:44 AM  
 BUN 12 07/10/2018 10:44 AM  
 Creatinine 1.12 (H) 07/10/2018 10:44 AM  
 GFR est AA >60 07/10/2018 10:44 AM  
 GFR est non-AA 51 (L) 07/10/2018 10:44 AM  
 Calcium 9.8 07/10/2018 10:44 AM  
 Magnesium 2.1 05/23/2017 08:40 AM  
 Albumin 3.6 07/10/2018 10:44 AM  
 Protein, total 7.5 07/10/2018 10:44 AM  
 Globulin 3.9 (H) 07/10/2018 10:44 AM  
 A-G Ratio 0.9 (L) 07/10/2018 10:44 AM  
 AST (SGOT) 12 (L) 07/10/2018 10:44 AM  
 ALT (SGPT) 25 07/10/2018 10:44 AM  
 
Lab Results Component Value Date/Time WBC 5.7 07/10/2018 10:44 AM  
 HGB 11.4 (L) 07/10/2018 10:44 AM  
 HCT 37.0 07/10/2018 10:44 AM  
 PLATELET 216 79/81/4377 10:44 AM  
 
 
RADIOLOGY:   
 
BILATERAL SCREENING MAMMOGRAM, 11/15/2017. 
  
CLINICAL HISTORY: Routine screening mammogram. 
  
Comparison studies:  Screening mammograms 7/20/2016, and 7/15/2015. 
  
FINDINGS: 
  
Bilateral digital screening mammography, interpreted in conjunction with CAD 
overread. The breasts are composed of scattered fibroglandular elements. The 
axilla contain benign appearing lymph nodes. No evidence of evolving skin 
thickening, or nipple retraction is seen. Scattered punctate, and round 
appearing calcifications are seen which are not felt to be worrisome given their 
scattered distribution and typically benign appearance. No evolving unique 
clustered microcalcifications are seen to suggest a worrisome process. A new 
posterior right breast mass is partially visualized. This is felt to reside at 
approximately the 9:00 position of the right breast located 10 cm from the 
nipple. Otherwise, no evolving dominant mass, spiculated density, or 
architectural distortion is seen. 
  
IMPRESSION: 
1. New posterior right breast mass which is incompletely characterized. This 
appears to be a true finding and not superimposed shadows.  Therefore, further 
evaluation with focused ultrasound is recommended. 
  
We are mailing breast density information to the patient along with the 
mammogram report. 
  
A reminder letter will be scheduled at the appropriate time pending additional 
views. 
  
 BI-RADS Assessment Category 0: Incomplete: Needs additional imaging evaluation. RIGHT BREAST ULTRASOUND, 11/17/2017. 
  
CLINICAL HISTORY: Right breast abnormality seen on screening mammogram. 
  
Technique: Grayscale, and Doppler imaging of the right breast soft tissues was 
performed using a 12 MHz transducer. 
  
FINDINGS: 
  
An ultrasound abnormality is seen at the 6:00 position of the right breast 
located 8 cm from the nipple felt to correspond to the prior mammogram 
abnormality. This was previously described at 9:00 based on an MLO view. The 
location is felt to be more accurately described by ultrasound. At this level, 
there is a hypoechoic area measuring 2.4 cm x 1 cm x 1.8 cm in size which occurs 
at the level of a prior right breast scar. Although this very well may represent 
benign scar tissue, this does appear to be a new finding by mammography and has 
not been demonstrated on any prior mammogram dating as far back as June 2005. Therefore, this felt that this should be more definitively confirmed by breast 
MRI. 
  
IMPRESSION:  
1. 2.4 cm x 1 cm x 1.8 cm hypoechoic abnormality at the level of a prior scar 
which may represent scar tissue although should be further characterized given 
that this does appear to have demonstrated evolution by mammography. This would 
be best performed with a contrasted bilateral breast MRI. 
  
A reminder letter will be scheduled at the appropriate time pending additional 
views. 
  
BI-RADS Assessment Category 0: Incomplete: Needs additional imaging evaluation. MRI BILATERAL BREASTS WITHOUT AND WITH CONTRAST, 11/28/2017. 
   
CLINICAL HISTORY:  57-year-old with history of prior right breast cancer in 1999 
treated with lumpectomy and radiation. Presents for further assessment of 
ultrasound abnormality seen at level of prior scar. No current breast 
complaints. 
  
Sequences obtained: Sagittal T2, axial STIR, axial T1, and axial fat-saturated T1-weighted images prior to and following administration of contrast.  
  
Comparison studies: Breast MRI 11/13/2008, and bilateral mammogram 11/15/2017. 
   
FINDINGS:  
The breasts are composed of heterogeneous fibroglandular elements. No enlarged 
axillary lymph nodes are seen. No enlarged internal mammary lymph nodes are 
seen. Evaluation of the lungs is limited by  MRI imaging. However, no obvious 
pulmonary masses are seen. No significant pleural effusions are seen. The liver 
is obscured by cardiac motion artifact and only partially visualized limiting 
assessment. Multiple T2 hyperintense lesions are seen. Similar lesions were 
seen on the prior MRI study in these grossly do not appear to enhance following 
contrast administration and therefore favored to represent benign cysts. 
   
Following the administration of intravenous contrast, normal enhancement is seen 
of the heart and vascular structures of the breasts. Asymmetric enhancement is 
seen of the breasts with mild to moderate left, and no significant right 
background enhancement. This is likely due to prior right breast radiation as 
indicated in the patient's clinical history. At the 7:00 position of the right 
breast centered 9.4 cm from the nipple there is an enhancing mass measuring 2.1 
cm x 1.6 cm x 1.7 cm in size at the level of the patient's prior lumpectomy scar 
which is highly concerning for locally recurrent disease. There is abnormal 
enhancement seen in the directly adjacent pectoralis muscle best appreciated on 
postcontrast image 200 consistent with pectoralis muscle involvement. No 
dominant enhancing masses, or worrisome patterns of enhancement are otherwise 
seen. Within again, there is asymmetric nodular enhancement in the left breast 
without a dominant enhancing mass or segmental area of asymmetrically prominent 
nonmasslike enhancement to suggest a worrisome process. No nodule definitely demonstrating washout is demonstrated. No evidence of skin thickening, or 
nipple retraction is seen. No enhancing osseous lesion is seen in the visualized 
chest. 
 
IMPRESSION: 
1.  2.1 cm x 1.6 cm x 1.7 cm enhancing mass at the level of the patient's right 
breast lumpectomy bed concerning for locally recurrent malignancy. There is 
evidence for involvement of the directly adjacent pectoralis muscle. Further 
evaluation with ultrasound-guided biopsy is recommended given that a prior 
diagnostic ultrasound has demonstrated an abnormal lesion at this level. No 
additional enhancing abnormalities are seen to suggest potential multifocal or 
multicentric disease. 
  
2. No evidence for metastatic disease in the visualized chest. 
  
BI-RADS Assessment Category 4: Suspicious Finding- Biopsy should be considered. IMPRESSION:  Bridgette Orlando is a 75 y.o. female with Stage IIA breast cancer s/p right breast mastectomy. She completed radiation of the right chest wall, 8/2/2-18. Ms Shaun Figueroa is now 1 month after completing radiation therapy. She is recovering as anticipated from radiation therapy. She continues with some expected side effects related to radiation such as some occasional tenderness and tightness with axilla. PLAN:  
1) Encouraged lotioning of chest wall along with light massage. Refer to [facility] for evaluation of sleeve/excercise of extremity. 2) Endocrine therapy under the direction of Dr. Ute Rodgers, medical oncology 3) We discussed ongoing surveillance for her breast cancer which would include follow up every 6 months  x2 years then yearly generally. All questions were answered to the best of my ability. RTC 6 months. 4) I spent 25 minutes in the care of Ms Shaun Figueroa today, over 50% of which was in direct counseling and ongoing management of her right breast cancer s/p right breast mastectomy. All questions were answered to the best of my ability. Dakota Pacheco NP 
09/06/18 Portions of this note were copied from prior encounters and reviewed for accuracy, currency, and represent documentation and tasks completed during this encounter. I verify and attest these portions to be unchanged from prior visits.

## 2018-09-18 ENCOUNTER — HOSPITAL ENCOUNTER (OUTPATIENT)
Dept: PHYSICAL THERAPY | Age: 68
Discharge: HOME | End: 2018-09-18
Payer: COMMERCIAL

## 2018-09-18 PROCEDURE — 97161 PT EVAL LOW COMPLEX 20 MIN: CPT

## 2018-09-18 PROCEDURE — 97140 MANUAL THERAPY 1/> REGIONS: CPT

## 2018-09-18 PROCEDURE — 97110 THERAPEUTIC EXERCISES: CPT

## 2018-09-18 PROCEDURE — G8985 CARRY GOAL STATUS: HCPCS

## 2018-09-18 PROCEDURE — G8984 CARRY CURRENT STATUS: HCPCS

## 2018-09-18 NOTE — PROGRESS NOTES
Ambulatory/Rehab Services H2 Model Falls Risk Assessment    Risk Factor Pts. ·   Confusion/Disorientation/Impulsivity  []    4 ·   Symptomatic Depression  []   2 ·   Altered Elimination  []   1 ·   Dizziness/Vertigo  []   1 ·   Gender (Male)  []   1 ·   Any administered antiepileptics (anticonvulsants):  []   2 ·   Any administered benzodiazepines:  []   1 ·   Visual Impairment (specify):  []   1 ·   Portable Oxygen Use  []   1 ·   Orthostatic ? BP  []   1 ·   History of Recent Falls (within 3 mos.)  []   5     Ability to Rise from Chair (choose one) Pts. ·   Ability to rise in a single movement  [x]   0 ·   Pushes up, successful in one attempt  []   1 ·   Multiple attempts, but successful  []   3 ·   Unable to rise without assistance  []   4   Total: (5 or greater = High Risk) 0     Falls Prevention Plan:   []                Physical Limitations to Exercise (specify):   []                Mobility Assistance Device (type):   []                Exercise/Equipment Adaptation (specify):    ©2010 [facility] [place] Patent #[id number].  Federal Law prohibits the replication, distribution or use without written permission from [facility] [employer]

## 2018-09-18 NOTE — THERAPY EVALUATION
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 5 = High Risk)  MD Orders: oncology rehab MEDICAL/REFERRING DIAGNOSIS:  Malignant neoplasm of unspecified site of right female breast [C50.911]    DATE OF ONSET: 18  REFERRING PHYSICIAN: Viral Galeana NP  RETURN PHYSICIAN APPOINTMENT: 3/19  Dr. Tamara Maldonado:  Ms. Meera Ruiz presents following treatment of  Right breast cancer. She originally was diagnosed in [address] [place] [address]. She underwent a lumpectomy and was placed on tamoxifen. She developed cancer again in . She underwent a mastectomy 18. She had 4 nodes removed. She completed radiation . She is currently in the process of a lat flap reconstruction with expander. She will benefit from therapeutic exercises and lymphedema education. PROBLEM LIST (Impacting functional limitations):  1. Decreased Strength  2. Decreased Flexibility/Joint Mobility  3. Edema/Girth  4. Decreased Knowledge of Precautions  5. Decreased [place] with Home Exercise Program INTERVENTIONS PLANNED:  1. Decongestion Therapy  2. Home Exercise Program (HEP)  3. Manual Therapy  4. Range of Motion (ROM)  5. Therapeutic Exercise/Strengthening   TREATMENT PLAN:  Effective Dates: 2018 TO 2018 (90 days).   Frequency/Duration: 2 times a week for 90 Days  GOALS: (Goals have been discussed and agreed upon with patient.)  Short-Term Functional Goals: Time Frame: 4 weeks  1. The patient will be independent with HEP for ROM within 4 weeks. 2. The patient will have knowledge of signs and symptoms of lymphedema and how to manage within 4 weeks. 3. The patient will gain 10 degrees ROM of the right shoulder within 4 weeks. 4. The patient will be fit with a compression sleeve as needed within 4 weeks. Discharge Goals: Time Frame: 8 weeks  1. The patient will have full ROM of the right shoulder within 8 weeks. Rehabilitation Potential For Stated Goals: Good  Regarding Lupe Pisano's therapy, I certify that the treatment plan above will be carried out by a therapist or under their direction. Thank you for this referral,  Brandon Brito PT     Referring Physician Signature: Tank Jj NP              Date                    The information in this section was collected on 9/18/18 (except where otherwise noted). HISTORY:   History of Present Injury/Illness (Reason for Referral):  Right breast cancer, mastectomy with lat flap reconstruction, risk for lymphedema, decreased ROM, radiation, arimidex  Past Medical History/Comorbidities:   Ms. John Roach  has a past medical history of Abnormal results of thyroid function studies; Anemia; Anxiety; BPV (benign positional vertigo); Breast cancer ([address] (1999); Breast cancer ([address] (2018); Chronic depression; Chronic tension headache; Diabetes mellitus type II, controlled ([address] Elevated glucose; Encounter for long-term (current) use of high-risk medication; Encounter for long-term (current) use of medications; Fatigue; Heart palpitations; Hematuria; Hot flashes, menopausal; Hyperlipidemia; Hypertension, benign; Insomnia; Menopausal disorder; Microcytosis; and Obesity. She also has no past medical history of Adverse effect of anesthesia; Difficult intubation; Malignant hyperthermia due to anesthesia; Nausea & vomiting; or Pseudocholinesterase deficiency.   Ms. John Roach  has a past surgical history that includes hx breast lumpectomy (1999); hx breast biopsy (1999); hx partial hysterectomy (1979); hx cataract removal (Left, 10/2017); hx cataract removal (Right, 11/2017); hx mastectomy (Right, 2/21/2018); and hx breast reconstruction (Right, 2/21/2018). Past Medical History:   Diagnosis Date    Abnormal results of thyroid function studies     Anemia     Anxiety     BPV (benign positional vertigo)     Breast cancer ([facility] 1999    right---radiation and 5 years of tamoxifen--lumpectomy    Breast cancer ([facility] 2018    right    Chronic depression     Chronic tension headache     Diabetes mellitus type II, controlled ([facility]     saxenda- bs: does not check blood sugars.  Elevated glucose     Encounter for long-term (current) use of high-risk medication     Encounter for long-term (current) use of medications     Fatigue     Heart palpitations     Hematuria     Hot flashes, menopausal     Hyperlipidemia     Hypertension, benign     Insomnia     Menopausal disorder     Microcytosis     Obesity      Past Surgical History:   Procedure Laterality Date    HX BREAST BIOPSY  1999    HX BREAST LUMPECTOMY  1999    HX BREAST RECONSTRUCTION Right 2/21/2018    BREAST RECONSTRUCTION RIGHT performed by Noelle Buerger, MD at [address] [place] [address] HX CATARACT REMOVAL Left 10/2017     with IOL    HX CATARACT REMOVAL Right 11/2017    with IOL    HX MASTECTOMY Right 2/21/2018    BREAST MASTECTOMY SIMPLE RIGHT performed by Olam Eisenmenger, MD at [facility] w/ ovaries       Social History/Living Environment:     lives with spouse  Prior Level of Function/Work/Activity:  Works full time as a front office/ , desk work  Dominant Side:         RIGHT  Current Medications:       Current Outpatient Prescriptions:     anastrozole (ARIMIDEX) 1 mg tablet, Take 1 Tab by mouth daily.  Indications: Hormone Receptor Positive Breast Cancer, Disp: 90 Tab, Rfl: 3    nebivolol (BYSTOLIC) 5 mg tablet, Take 1 Tab by mouth daily. Indications: hypertension (Patient taking differently: Take 5 mg by mouth daily. Indications: hypertension, am- take on the dos), Disp: 90 Tab, Rfl: 3    ALPRAZolam (XANAX) 0.5 mg tablet, Take 1 Tab by mouth nightly. Max Daily Amount: 0.5 mg. 1/2 to 1 for insomnia (Patient taking differently: Take 0.5 mg by mouth nightly as needed. 1/2 to 1 for insomnia  Indications: anxiety), Disp: 90 Tab, Rfl: 1    venlafaxine (EFFEXOR) 75 mg tablet, Take 1 Tab by mouth two (2) times a day. (Patient taking differently: Take 75 mg by mouth two (2) times a day. Indications: VASOMOTOR SYMPTOMS ASSOCIATED WITH MENOPAUSE, take on the dos), Disp: 180 Tab, Rfl: 3    spironolactone (ALDACTONE) 25 mg tablet, Take 1 Tab by mouth daily. (Patient taking differently: Take 25 mg by mouth daily. Indications: am), Disp: 90 Tab, Rfl: 3    liraglutide (SAXENDA) 3 mg/0.5 mL (18 mg/3 mL) pen, 0.5 mL by SubCUTAneous route daily. (Patient taking differently: 3 mg by SubCUTAneous route every evening. Indications: takes for diabetes management), Disp: 15 Pen, Rfl: 3    Insulin Needles, Disposable, (ARIANA PEN NEEDLE) 32 gauge x 5/32\" ndle, 1 Pen Needle by Does Not Apply route daily. , Disp: 90 Pen Needle, Rfl: 3    amLODIPine (NORVASC) 5 mg tablet, Take 1 Tab by mouth daily. (Patient taking differently: Take 5 mg by mouth daily. Indications: am- take on the dos), Disp: 90 Tab, Rfl: 3    butalbital-acetaminophen-caffeine (FIORICET, ESGIC) -40 mg per tablet, Take 1-2 Tabs by mouth four (4) times daily as needed for Pain. Max Daily Amount: 8 Tabs. (Patient taking differently: Take 1-2 Tabs by mouth four (4) times daily as needed for Pain. Indications: Migraine), Disp: 30 Tab, Rfl: 1    fluticasone (FLONASE) 50 mcg/actuation nasal spray, 2 Sprays by Both Nostrils route daily. (Patient taking differently: 2 Sprays by Both Nostrils route daily as needed. Indications:  Allergic Rhinitis), Disp: 1 Bottle, Rfl: 0    aspirin 81 mg chewable tablet, Take 81 mg by mouth daily. Indications: myocardial infarction prevention, am- hold until after surgery, Disp: , Rfl:     Calcium-Vitamin D3-Vitamin K (VIACTIV) 751-015-59 mg-unit-mcg chew, Take 1 Tab by mouth two (2) times a day. Indications: HYPOCALCEMIA PREVENTION, hold until after surgery, Disp: , Rfl:    Date Last Reviewed:  9/18/18   Number of Personal Factors/Comorbidities that affect the Plan of Care: 1-2: MODERATE COMPLEXITY   EXAMINATION:   Palpation:          Tissue very tight around expanders and right lateral trunk region  ROM:          Right flexion 130, abduction 100, external rotation 45  Strength:          Grossly 4/5 x 4 extremties  Functional Mobility:         Gait/Ambulation:  independent        Transfers:  independent        Bed Mobility:  independent  Skin Integrity:          intact  Edema/Girth:  non-pitting    Left Right    Initial Most Recent Initial Most Recent   Upper  Extremity Base 3rd Finger (cm): 18.8  Wrist (cm): 15.7  Mid Forearm (cm): 20  Elbow (cm): 25.1  Axilla (cm): 27 (31.2)   Base 3rd Finger (cm): 19.2  Wrist (cm): 16.5  Mid Forearm (cm): 21.3  Elbow (cm): 26.5  Axilla (cm): 29.6 (32.5)     Lower  Extremity               Body Structures Involved:  1. Joints  2. Muscles  3. lymphatic system Body Functions Affected:  1. Sensory/Pain  2. Neuromusculoskeletal Activities and Participation Affected:  1. None   Number of elements (examined above) that affect the Plan of Care: 4+: HIGH COMPLEXITY   CLINICAL PRESENTATION:   Presentation: Stable and uncomplicated: LOW COMPLEXITY   CLINICAL DECISION MAKING:   Outcome Measure: Tool Used: Disabilities of the Arm, Shoulder and Hand (DASH) Questionnaire - Quick Version  Score:  Initial: 24/55  Most Recent: X/55 (Date: -- )   Interpretation of Score: The DASH is designed to measure the activities of daily living in person's with upper extremity dysfunction or pain.   Each section is scored on a 1-5 scale, 5 representing the greatest disability. The scores of each section are added together for a total score of 55. Score 11 12-19 20-28 29-37 38-45 46-54 55   Modifier CH CI CJ CK CL CM CN     ? Carrying, Moving, and Handling Objects:     - CURRENT STATUS: CJ - 20%-39% impaired, limited or restricted    - GOAL STATUS: CI - 1%-19% impaired, limited or restricted    - D/C STATUS:  ---------------To be determined---------------      Tool Used: ECOG Performance Survey Score  Score:  Initial: 1 Most Recent:      Interpretation of Score:   0 Fully active, able to carry on all pre-disease performance without restriction   1 Restricted in physically strenuous activity but ambulatory and able to carry out work of a light or sedentary nature, e.g., light house work, office work   2 Ambulatory and capable of all selfcare but unable to carry out any work activities. Up and about more than 50% of waking hours   3 Capable of only limited selfcare, confined to bed or chair more than 50% of waking hours   4 Completely disabled. Cannot carry on any selfcare. Totally confined to bed or chair   5 Dead        Medical Necessity:   · Patient is expected to demonstrate progress in strength, range of motion and edema management to increase independence with self care and household activity. Reason for Services/Other Comments:  · Patient continues to demonstrate capacity to improve strength, ROM and edema management  which will increase independence.    Use of outcome tool(s) and clinical judgement create a POC that gives a: Clear prediction of patient's progress: LOW COMPLEXITY            TREATMENT:   (In addition to Assessment/Re-Assessment sessions the following treatments were rendered)  Pre-treatment Symptoms/Complaints:  Limited ROM, risk for lymphedema  Pain: Initial:     5/10 Post Session:  5/10     assessment  Instructed in HEP: wand external rotation, chicken wing, trunk rotation, standing moose and scapular retraction x 5 reps with 5 count hold, to increase to 10 reps. Educated in lymphedema and risk reduction. Given written information. [place] [facility]  Treatment/Session Assessment:    · Response to Treatment:  Tolerated the assessment well. · Compliance with Program/Exercises: Will assess as treatment progresses. · Recommendations/Intent for next treatment session: \"Next visit will focus on advancements to more challenging activities\".   Total Treatment Duration:  PT Patient Time In/Time Out  Time In: SAITNDER Phillips

## 2018-09-25 ENCOUNTER — HOSPITAL ENCOUNTER (OUTPATIENT)
Dept: PHYSICAL THERAPY | Age: 68
Discharge: HOME | End: 2018-09-25
Payer: COMMERCIAL

## 2018-09-25 PROCEDURE — 97110 THERAPEUTIC EXERCISES: CPT

## 2018-09-25 NOTE — PROGRESS NOTES
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 5 = High Risk)  MD Orders: oncology rehab MEDICAL/REFERRING DIAGNOSIS:  Malignant neoplasm of unspecified site of right female breast [C50.911]    DATE OF ONSET: 18  REFERRING PHYSICIAN: Alexandra Cabrera NP  RETURN PHYSICIAN APPOINTMENT: 3/19  Dr. Jaylyn Denney:  Ms. Susana Herrera presents following treatment of  Right breast cancer. She originally was diagnosed in [address] [place] [address]. She underwent a lumpectomy and was placed on tamoxifen. She developed cancer again in . She underwent a mastectomy 18. She had 4 nodes removed. She completed radiation . She is currently in the process of a lat flap reconstruction with expander. She will benefit from therapeutic exercises and lymphedema education. PROBLEM LIST (Impacting functional limitations):  1. Decreased Strength  2. Decreased Flexibility/Joint Mobility  3. Edema/Girth  4. Decreased Knowledge of Precautions  5. Decreased [place] with Home Exercise Program INTERVENTIONS PLANNED:  1. Decongestion Therapy  2. Home Exercise Program (HEP)  3. Manual Therapy  4. Range of Motion (ROM)  5. Therapeutic Exercise/Strengthening   TREATMENT PLAN:  Effective Dates: 2018 TO 2018 (90 days).   Frequency/Duration: 2 times a week for 90 Days  GOALS: (Goals have been discussed and agreed upon with patient.)  Short-Term Functional Goals: Time Frame: 4 weeks  1. The patient will be independent with HEP for ROM within 4 weeks. 2. The patient will have knowledge of signs and symptoms of lymphedema and how to manage within 4 weeks. 3. The patient will gain 10 degrees ROM of the right shoulder within 4 weeks. 4. The patient will be fit with a compression sleeve as needed within 4 weeks. Discharge Goals: Time Frame: 8 weeks  1. The patient will have full ROM of the right shoulder within 8 weeks. Rehabilitation Potential For Stated Goals: Good  Regarding Carolyn Davaloslinda's therapy, I certify that the treatment plan above will be carried out by a therapist or under their direction. Thank you for this referral,  Johnnie Arriaza PT     Referring Physician Signature: Douglas Mac NP              Date                    The information in this section was collected on 9/18/18 (except where otherwise noted). HISTORY:   History of Present Injury/Illness (Reason for Referral):  Right breast cancer, mastectomy with lat flap reconstruction, risk for lymphedema, decreased ROM, radiation, arimidex  Past Medical History/Comorbidities:   Ms. Valeria Gonzalez  has a past medical history of Abnormal results of thyroid function studies; Anemia; Anxiety; BPV (benign positional vertigo); Breast cancer ([facility] (1999); Breast cancer ([facility] [facility] (2018); Chronic depression; Chronic tension headache; Diabetes mellitus type II, controlled ([address] Elevated glucose; Encounter for long-term (current) use of high-risk medication; Encounter for long-term (current) use of medications; Fatigue; Heart palpitations; Hematuria; Hot flashes, menopausal; Hyperlipidemia; Hypertension, benign; Insomnia; Menopausal disorder; Microcytosis; and Obesity. She also has no past medical history of Adverse effect of anesthesia; Difficult intubation; Malignant hyperthermia due to anesthesia; Nausea & vomiting; or Pseudocholinesterase deficiency.   Ms. Valeria Gonzalez  has a past surgical history that includes hx breast lumpectomy (1999); hx breast biopsy (1999); hx partial hysterectomy (1979); hx cataract removal (Left, 10/2017); hx cataract removal (Right, 11/2017); hx mastectomy (Right, 2/21/2018); and hx breast reconstruction (Right, 2/21/2018). Past Medical History:   Diagnosis Date    Abnormal results of thyroid function studies     Anemia     Anxiety     BPV (benign positional vertigo)     Breast cancer ([facility] [address] 1999    right---radiation and 5 years of tamoxifen--lumpectomy    Breast cancer ([facility] [address] 2018    right    Chronic depression     Chronic tension headache     Diabetes mellitus type II, [facility] ([facility]     saxenda- bs: does not check blood sugars.  Elevated glucose     Encounter for long-term (current) use of high-risk medication     Encounter for long-term (current) use of medications     Fatigue     Heart palpitations     Hematuria     Hot flashes, menopausal     Hyperlipidemia     Hypertension, benign     Insomnia     Menopausal disorder     Microcytosis     Obesity      Past Surgical History:   Procedure Laterality Date    HX BREAST BIOPSY  1999    HX BREAST LUMPECTOMY  1999    HX BREAST RECONSTRUCTION Right 2/21/2018    BREAST RECONSTRUCTION RIGHT performed by Doris Ward MD at [address] [place] [address] HX CATARACT REMOVAL Left 10/2017     with IOL    HX CATARACT REMOVAL Right 11/2017    with IOL    HX MASTECTOMY Right 2/21/2018    BREAST MASTECTOMY SIMPLE RIGHT performed by Sujey Flores MD at [facility] w/ ovaries       Social History/Living Environment:     lives with spouse  Prior Level of Function/Work/Activity:  Works full time as a front office/ , desk work  Dominant Side:         RIGHT  Current Medications:       Current Outpatient Prescriptions:     anastrozole (ARIMIDEX) 1 mg tablet, Take 1 Tab by mouth daily.  Indications: Hormone Receptor Positive Breast Cancer, Disp: 90 Tab, Rfl: 3    nebivolol (BYSTOLIC) 5 mg tablet, Take 1 Tab by mouth daily. Indications: hypertension (Patient taking differently: Take 5 mg by mouth daily. Indications: hypertension, am- take on the dos), Disp: 90 Tab, Rfl: 3    ALPRAZolam (XANAX) 0.5 mg tablet, Take 1 Tab by mouth nightly. Max Daily Amount: 0.5 mg. 1/2 to 1 for insomnia (Patient taking differently: Take 0.5 mg by mouth nightly as needed. 1/2 to 1 for insomnia  Indications: anxiety), Disp: 90 Tab, Rfl: 1    venlafaxine (EFFEXOR) 75 mg tablet, Take 1 Tab by mouth two (2) times a day. (Patient taking differently: Take 75 mg by mouth two (2) times a day. Indications: VASOMOTOR SYMPTOMS ASSOCIATED WITH MENOPAUSE, take on the dos), Disp: 180 Tab, Rfl: 3    spironolactone (ALDACTONE) 25 mg tablet, Take 1 Tab by mouth daily. (Patient taking differently: Take 25 mg by mouth daily. Indications: am), Disp: 90 Tab, Rfl: 3    liraglutide (SAXENDA) 3 mg/0.5 mL (18 mg/3 mL) pen, 0.5 mL by SubCUTAneous route daily. (Patient taking differently: 3 mg by SubCUTAneous route every evening. Indications: takes for diabetes management), Disp: 15 Pen, Rfl: 3    Insulin Needles, Disposable, (ARIANA PEN NEEDLE) 32 gauge x 5/32\" ndle, 1 Pen Needle by Does Not Apply route daily. , Disp: 90 Pen Needle, Rfl: 3    amLODIPine (NORVASC) 5 mg tablet, Take 1 Tab by mouth daily. (Patient taking differently: Take 5 mg by mouth daily. Indications: am- take on the dos), Disp: 90 Tab, Rfl: 3    butalbital-acetaminophen-caffeine (FIORICET, ESGIC) -40 mg per tablet, Take 1-2 Tabs by mouth four (4) times daily as needed for Pain. Max Daily Amount: 8 Tabs. (Patient taking differently: Take 1-2 Tabs by mouth four (4) times daily as needed for Pain. Indications: Migraine), Disp: 30 Tab, Rfl: 1    fluticasone (FLONASE) 50 mcg/actuation nasal spray, 2 Sprays by Both Nostrils route daily. (Patient taking differently: 2 Sprays by Both Nostrils route daily as needed. Indications:  Allergic Rhinitis), Disp: 1 Bottle, Rfl: 0    aspirin 81 mg chewable tablet, Take 81 mg by mouth daily. Indications: myocardial infarction prevention, am- hold until after surgery, Disp: , Rfl:     Calcium-Vitamin D3-Vitamin K (VIACTIV) [phone] mg-unit-mcg chew, Take 1 Tab by mouth two (2) times a day. Indications: HYPOCALCEMIA PREVENTION, hold until after surgery, Disp: , Rfl:    Date Last Reviewed:  9/18/18   Number of Personal Factors/Comorbidities that affect the Plan of Care: 1-2: MODERATE COMPLEXITY   EXAMINATION:   Palpation:          Tissue very tight around expanders and right lateral trunk region  ROM:          Right flexion 155, abduction 130, external rotation 65  Strength:          Grossly 4/5 x 4 extremties  Functional Mobility:         Gait/Ambulation:  independent        Transfers:  independent        Bed Mobility:  independent  Skin Integrity:          intact  Edema/Girth:  non-pitting    Left Right    Initial Most Recent Initial Most Recent   Upper  Extremity           Lower  Extremity               Body Structures Involved:  1. Joints  2. Muscles  3. lymphatic system Body Functions Affected:  1. Sensory/Pain  2. Neuromusculoskeletal Activities and Participation Affected:  1. None   Number of elements (examined above) that affect the Plan of Care: 4+: HIGH COMPLEXITY   CLINICAL PRESENTATION:   Presentation: Stable and uncomplicated: LOW COMPLEXITY   CLINICAL DECISION MAKING:   Outcome Measure: Tool Used: Disabilities of the Arm, Shoulder and Hand (DASH) Questionnaire - Quick Version  Score:  Initial: 24/55  Most Recent: X/55 (Date: -- )   Interpretation of Score: The DASH is designed to measure the activities of daily living in person's with upper extremity dysfunction or pain. Each section is scored on a 1-5 scale, 5 representing the greatest disability. The scores of each section are added together for a total score of 55. Score 11 12-19 20-28 29-37 38-45 46-54 55   Modifier CH CI CJ CK CL CM CN     ?  Carrying, Moving, and Handling Objects:     - CURRENT STATUS: CJ - 20%-39% impaired, limited or restricted    - GOAL STATUS: CI - 1%-19% impaired, limited or restricted    - D/C STATUS:  ---------------To be determined---------------      Tool Used: ECOG Performance Survey Score  Score:  Initial: 1 Most Recent:      Interpretation of Score:   0 Fully active, able to carry on all pre-disease performance without restriction   1 Restricted in physically strenuous activity but ambulatory and able to carry out work of a light or sedentary nature, e.g., light house work, office work   2 Ambulatory and capable of all selfcare but unable to carry out any work activities. Up and about more than 50% of waking hours   3 Capable of only limited selfcare, confined to bed or chair more than 50% of waking hours   4 Completely disabled. Cannot carry on any selfcare. Totally confined to bed or chair   5 Dead        Medical Necessity:   · Patient is expected to demonstrate progress in strength, range of motion and edema management to increase independence with self care and household activity. Reason for Services/Other Comments:  · Patient continues to demonstrate capacity to improve strength, ROM and edema management  which will increase independence. Use of outcome tool(s) and clinical judgement create a POC that gives a: Clear prediction of patient's progress: LOW COMPLEXITY            TREATMENT:   (In addition to Assessment/Re-Assessment sessions the following treatments were rendered)  Pre-treatment Symptoms/Complaints:  Limited ROM, risk for lymphedema  Pain: Initial:     0/10 Post Session:  0/10     as below  Instructed in HEP: wand external rotation, chicken wing, trunk rotation, standing moose and scapular retraction x 5 reps with 5 count hold, to increase to 10 reps. Educated in lymphedema and risk reduction. Given written information. Added wand flexion and sword of hope in supine.   To increase to 10 reps with each exercise. Will add lights weights next visit. The patient is returning to [employer] to begin lower body exercises. No upper body as of yet. Cycle Portal  Treatment/Session Assessment:    · Response to Treatment:  Tolerated the treatment well. · Compliance with Program/Exercises: Will assess as treatment progresses. · Recommendations/Intent for next treatment session: \"Next visit will focus on advancements to more challenging activities\".   Total Treatment Duration: 34 min  PT Patient Time In/Time Out  Time In: 1510  Time Out: [address], PT

## 2018-10-02 ENCOUNTER — PATIENT OUTREACH (OUTPATIENT)
Dept: CASE MANAGEMENT | Age: 68
End: 2018-10-02

## 2018-10-02 ENCOUNTER — HOSPITAL ENCOUNTER (OUTPATIENT)
Dept: PHYSICAL THERAPY | Age: 68
Discharge: HOME | End: 2018-10-02
Payer: COMMERCIAL

## 2018-10-02 ENCOUNTER — HOSPITAL ENCOUNTER (OUTPATIENT)
Dept: LAB | Age: 68
Discharge: HOME | End: 2018-10-02
Payer: COMMERCIAL

## 2018-10-02 DIAGNOSIS — C50.911 MALIGNANT NEOPLASM OF RIGHT BREAST IN FEMALE, ESTROGEN RECEPTOR POSITIVE, UNSPECIFIED SITE OF BREAST (HCC): ICD-10-CM

## 2018-10-02 DIAGNOSIS — Z17.0 MALIGNANT NEOPLASM OF RIGHT BREAST IN FEMALE, ESTROGEN RECEPTOR POSITIVE, UNSPECIFIED SITE OF BREAST (HCC): ICD-10-CM

## 2018-10-02 LAB
ALBUMIN SERPL-MCNC: 3.7 G/DL (ref 3.2–4.6)
ALBUMIN/GLOB SERPL: 0.9 {RATIO} (ref 1.2–3.5)
ALP SERPL-CCNC: 76 U/L (ref 50–136)
ALT SERPL-CCNC: 26 U/L (ref 12–65)
ANION GAP SERPL CALC-SCNC: 3 MMOL/L (ref 7–16)
AST SERPL-CCNC: 19 U/L (ref 15–37)
BASOPHILS # BLD: 0 K/UL (ref 0–0.2)
BASOPHILS NFR BLD: 0 % (ref 0–2)
BILIRUB SERPL-MCNC: 0.2 MG/DL (ref 0.2–1.1)
BUN SERPL-MCNC: 15 MG/DL (ref 8–23)
CALCIUM SERPL-MCNC: 10.1 MG/DL (ref 8.3–10.4)
CHLORIDE SERPL-SCNC: 107 MMOL/L (ref 98–107)
CO2 SERPL-SCNC: 30 MMOL/L (ref 21–32)
CREAT SERPL-MCNC: 1.09 MG/DL (ref 0.6–1)
DIFFERENTIAL METHOD BLD: ABNORMAL
EOSINOPHIL # BLD: 0 K/UL (ref 0–0.8)
EOSINOPHIL NFR BLD: 1 % (ref 0.5–7.8)
ERYTHROCYTE [DISTWIDTH] IN BLOOD BY AUTOMATED COUNT: 14.6 % (ref 11.9–14.6)
GLOBULIN SER CALC-MCNC: 4 G/DL (ref 2.3–3.5)
GLUCOSE SERPL-MCNC: 93 MG/DL (ref 65–100)
HCT VFR BLD AUTO: 37.4 % (ref 35.8–46.3)
HGB BLD-MCNC: 11.5 G/DL (ref 11.7–15.4)
IMM GRANULOCYTES # BLD: 0 K/UL (ref 0–0.5)
IMM GRANULOCYTES NFR BLD AUTO: 1 % (ref 0–5)
LYMPHOCYTES # BLD: 1.5 K/UL (ref 0.5–4.6)
LYMPHOCYTES NFR BLD: 31 % (ref 13–44)
MCH RBC QN AUTO: 24.2 PG (ref 26.1–32.9)
MCHC RBC AUTO-ENTMCNC: 30.7 G/DL (ref 31.4–35)
MCV RBC AUTO: 78.6 FL (ref 79.6–97.8)
MONOCYTES # BLD: 0.7 K/UL (ref 0.1–1.3)
MONOCYTES NFR BLD: 14 % (ref 4–12)
NEUTS SEG # BLD: 2.5 K/UL (ref 1.7–8.2)
NEUTS SEG NFR BLD: 53 % (ref 43–78)
NRBC # BLD: 0 K/UL (ref 0–0.2)
PLATELET # BLD AUTO: 199 K/UL (ref 150–450)
PMV BLD AUTO: 8.8 FL (ref 9.4–12.3)
POTASSIUM SERPL-SCNC: 4.2 MMOL/L (ref 3.5–5.1)
PROT SERPL-MCNC: 7.7 G/DL (ref 6.3–8.2)
RBC # BLD AUTO: 4.76 M/UL (ref 4.05–5.25)
SODIUM SERPL-SCNC: 140 MMOL/L (ref 136–145)
WBC # BLD AUTO: 4.8 K/UL (ref 4.3–11.1)

## 2018-10-02 PROCEDURE — 97110 THERAPEUTIC EXERCISES: CPT

## 2018-10-02 PROCEDURE — 36415 COLL VENOUS BLD VENIPUNCTURE: CPT

## 2018-10-02 PROCEDURE — 80053 COMPREHEN METABOLIC PANEL: CPT

## 2018-10-02 PROCEDURE — 85025 COMPLETE CBC W/AUTO DIFF WBC: CPT

## 2018-10-02 NOTE — PROGRESS NOTES
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 5 = High Risk)  MD Orders: oncology rehab MEDICAL/REFERRING DIAGNOSIS:  Malignant neoplasm of unspecified site of right female breast [C50.911]    DATE OF ONSET: 18  REFERRING PHYSICIAN: Maricarmen Reese NP  RETURN PHYSICIAN APPOINTMENT: 3/19  Dr. Elier Meyers Quiet:  Ms. Tri Pardo presents following treatment of  Right breast cancer. She originally was diagnosed in [address] [place] [address]. She underwent a lumpectomy and was placed on tamoxifen. She developed cancer again in . She underwent a mastectomy 18. She had 4 nodes removed. She completed radiation . She is currently in the process of a lat flap reconstruction with expander. She will benefit from therapeutic exercises and lymphedema education. PROBLEM LIST (Impacting functional limitations):  1. Decreased Strength  2. Decreased Flexibility/Joint Mobility  3. Edema/Girth  4. Decreased Knowledge of Precautions  5. Decreased [place] with Home Exercise Program INTERVENTIONS PLANNED:  1. Decongestion Therapy  2. Home Exercise Program (HEP)  3. Manual Therapy  4. Range of Motion (ROM)  5. Therapeutic Exercise/Strengthening   TREATMENT PLAN:  Effective Dates: 2018 TO 2018 (90 days).   Frequency/Duration: 2 times a week for 90 Days  GOALS: (Goals have been discussed and agreed upon with patient.)  Short-Term Functional Goals: Time Frame: 4 weeks  1. The patient will be independent with HEP for ROM within 4 weeks. 2. The patient will have knowledge of signs and symptoms of lymphedema and how to manage within 4 weeks. 3. The patient will gain 10 degrees ROM of the right shoulder within 4 weeks. 4. The patient will be fit with a compression sleeve as needed within 4 weeks. Discharge Goals: Time Frame: 8 weeks  1. The patient will have full ROM of the right shoulder within 8 weeks. Rehabilitation Potential For Stated Goals: Good  Regarding Elder Nipple Lovelist's therapy, I certify that the treatment plan above will be carried out by a therapist or under their direction. Thank you for this referral,  Bigg Braswell PT     Referring Physician Signature: Nidia Nyhan, NP              Date                    The information in this section was collected on 9/18/18 (except where otherwise noted). HISTORY:   History of Present Injury/Illness (Reason for Referral):  Right breast cancer, mastectomy with lat flap reconstruction, risk for lymphedema, decreased ROM, radiation, arimidex  Past Medical History/Comorbidities:   Ms. Raquel Felix  has a past medical history of Abnormal results of thyroid function studies; Anemia; Anxiety; BPV (benign positional vertigo); Breast cancer ([facility] [address] (1999); Breast cancer ([facility] [address] (2018); Chronic depression; Chronic tension headache; Diabetes mellitus type II, controlled ([address] Elevated glucose; Encounter for long-term (current) use of high-risk medication; Encounter for long-term (current) use of medications; Fatigue; Heart palpitations; Hematuria; Hot flashes, menopausal; Hyperlipidemia; Hypertension, benign; Insomnia; Menopausal disorder; Microcytosis; and Obesity. She also has no past medical history of Adverse effect of anesthesia; Difficult intubation; Malignant hyperthermia due to anesthesia; Nausea & vomiting; or Pseudocholinesterase deficiency.   Ms. Raquel Felix  has a past surgical history that includes hx breast lumpectomy (1999); hx breast biopsy (1999); hx partial hysterectomy (1979); hx cataract removal (Left, 10/2017); hx cataract removal (Right, 11/2017); hx mastectomy (Right, 2/21/2018); and hx breast reconstruction (Right, 2/21/2018). Past Medical History:   Diagnosis Date    Abnormal results of thyroid function studies     Anemia     Anxiety     BPV (benign positional vertigo)     Breast cancer ([facility] [address] 1999    right---radiation and 5 years of tamoxifen--lumpectomy    Breast cancer ([facility] [address] 2018    right    Chronic depression     Chronic tension headache     [facility]     saxenda- bs: does not check blood sugars.  Elevated glucose     Encounter for long-term (current) use of high-risk medication     Encounter for long-term (current) use of medications     Fatigue     Heart palpitations     Hematuria     Hot flashes, menopausal     Hyperlipidemia     Hypertension, benign     Insomnia     Menopausal disorder     Microcytosis     Obesity      Past Surgical History:   Procedure Laterality Date    HX BREAST BIOPSY  1999    HX BREAST LUMPECTOMY  1999    HX BREAST RECONSTRUCTION Right 2/21/2018    BREAST RECONSTRUCTION RIGHT performed by Lucien Dawson MD at [address] [place] [address] HX CATARACT REMOVAL Left 10/2017     with IOL    HX CATARACT REMOVAL Right 11/2017    with IOL    HX MASTECTOMY Right 2/21/2018    BREAST MASTECTOMY SIMPLE RIGHT performed by Ailyn Garcia MD at [facility] w/ ovaries       Social History/Living Environment:     lives with spouse  Prior Level of Function/Work/Activity:  Works full time as a front office/ , desk work  Dominant Side:         RIGHT  Current Medications:       Current Outpatient Prescriptions:     venlafaxine-SR (EFFEXOR-XR) 150 mg capsule, Take 1 Cap by mouth daily. , Disp: 30 Cap, Rfl: 4    anastrozole (ARIMIDEX) 1 mg tablet, Take 1 Tab by mouth daily. Indications: Hormone Receptor Positive Breast Cancer, Disp: 90 Tab, Rfl: 3    nebivolol (BYSTOLIC) 5 mg tablet, Take 1 Tab by mouth daily. Indications: hypertension (Patient taking differently: Take 5 mg by mouth daily. Indications: hypertension, am- take on the dos), Disp: 90 Tab, Rfl: 3    ALPRAZolam (XANAX) 0.5 mg tablet, Take 1 Tab by mouth nightly. Max Daily Amount: 0.5 mg. 1/2 to 1 for insomnia (Patient taking differently: Take 0.5 mg by mouth nightly as needed. 1/2 to 1 for insomnia  Indications: anxiety), Disp: 90 Tab, Rfl: 1    spironolactone (ALDACTONE) 25 mg tablet, Take 1 Tab by mouth daily. (Patient taking differently: Take 25 mg by mouth daily. Indications: am), Disp: 90 Tab, Rfl: 3    liraglutide (SAXENDA) 3 mg/0.5 mL (18 mg/3 mL) pen, 0.5 mL by SubCUTAneous route daily. (Patient taking differently: 3 mg by SubCUTAneous route every evening. Indications: takes for diabetes management), Disp: 15 Pen, Rfl: 3    Insulin Needles, Disposable, (ARIANA PEN NEEDLE) 32 gauge x 5/32\" ndle, 1 Pen Needle by Does Not Apply route daily. , Disp: 90 Pen Needle, Rfl: 3    amLODIPine (NORVASC) 5 mg tablet, Take 1 Tab by mouth daily. (Patient taking differently: Take 5 mg by mouth daily. Indications: am- take on the dos), Disp: 90 Tab, Rfl: 3    butalbital-acetaminophen-caffeine (FIORICET, ESGIC) -40 mg per tablet, Take 1-2 Tabs by mouth four (4) times daily as needed for Pain. Max Daily Amount: 8 Tabs. (Patient taking differently: Take 1-2 Tabs by mouth four (4) times daily as needed for Pain. Indications: Migraine), Disp: 30 Tab, Rfl: 1    fluticasone (FLONASE) 50 mcg/actuation nasal spray, 2 Sprays by Both Nostrils route daily. (Patient taking differently: 2 Sprays by Both Nostrils route daily as needed. Indications: Allergic Rhinitis), Disp: 1 Bottle, Rfl: 0    aspirin 81 mg chewable tablet, Take 81 mg by mouth daily.  Indications: myocardial infarction prevention, am- hold until after surgery, Disp: , Rfl:     Calcium-Vitamin D3-Vitamin K (VIACTIV) 066-714-07 mg-unit-mcg chew, Take 1 Tab by mouth two (2) times a day. Indications: HYPOCALCEMIA PREVENTION, hold until after surgery, Disp: , Rfl:    Date Last Reviewed:  9/18/18   Number of Personal Factors/Comorbidities that affect the Plan of Care: 1-2: MODERATE COMPLEXITY   EXAMINATION:   Palpation:          Tissue very tight around expanders and right lateral trunk region  ROM:          Right flexion 165, abduction 150, external rotation 90  Strength:          Grossly 4/5 x 4 extremties  Functional Mobility:         Gait/Ambulation:  independent        Transfers:  independent        Bed Mobility:  independent  Skin Integrity:          intact  Edema/Girth:  non-pitting    Left Right    Initial Most Recent Initial Most Recent   Upper  Extremity           Lower  Extremity               Body Structures Involved:  1. Joints  2. Muscles  3. lymphatic system Body Functions Affected:  1. Sensory/Pain  2. Neuromusculoskeletal Activities and Participation Affected:  1. None   Number of elements (examined above) that affect the Plan of Care: 4+: HIGH COMPLEXITY   CLINICAL PRESENTATION:   Presentation: Stable and uncomplicated: LOW COMPLEXITY   CLINICAL DECISION MAKING:   Outcome Measure: Tool Used: Disabilities of the Arm, Shoulder and Hand (DASH) Questionnaire - Quick Version  Score:  Initial: 24/55  Most Recent: X/55 (Date: -- )   Interpretation of Score: The DASH is designed to measure the activities of daily living in person's with upper extremity dysfunction or pain. Each section is scored on a 1-5 scale, 5 representing the greatest disability. The scores of each section are added together for a total score of 55. Score 11 12-19 20-28 29-37 38-45 46-54 55   Modifier CH CI CJ CK CL CM CN     ?  Carrying, Moving, and Handling Objects:     - CURRENT STATUS: CJ - 20%-39% impaired, limited or restricted    - GOAL STATUS: CI - 1%-19% impaired, limited or restricted    - D/C STATUS:  ---------------To be determined---------------      Tool Used: ECOG Performance Survey Score  Score:  Initial: 1 Most Recent:      Interpretation of Score:   0 Fully active, able to carry on all pre-disease performance without restriction   1 Restricted in physically strenuous activity but ambulatory and able to carry out work of a light or sedentary nature, e.g., light house work, office work   2 Ambulatory and capable of all selfcare but unable to carry out any work activities. Up and about more than 50% of waking hours   3 Capable of only limited selfcare, confined to bed or chair more than 50% of waking hours   4 Completely disabled. Cannot carry on any selfcare. Totally confined to bed or chair   5 Dead        Medical Necessity:   · Patient is expected to demonstrate progress in strength, range of motion and edema management to increase independence with self care and household activity. Reason for Services/Other Comments:  · Patient continues to demonstrate capacity to improve strength, ROM and edema management  which will increase independence. Use of outcome tool(s) and clinical judgement create a POC that gives a: Clear prediction of patient's progress: LOW COMPLEXITY            TREATMENT:   (In addition to Assessment/Re-Assessment sessions the following treatments were rendered)  Pre-treatment Symptoms/Complaints:  Limited ROM, risk for lymphedema. The patient resumed the gym last week and tolerated it well. She reports she continues with hot flashes, weight gain and body aches. She will continue arimidex per her physician. Pain: Initial:     0/10 Post Session:  0/10     as below   Fatigue 0/10   wand external rotation, wand flexion, chicken wing, trunk rotation, sword of hope, standing moose and scapular retraction x 10 reps with 5 count hold, to increase to 10 reps.   Instructed in bilateral upper extremities with 2# x 10 reps bicep curls, abduction, forward flexion, triceps extension, bent over rows. Given written HEP. [employer]  Treatment/Session Assessment:    · Response to Treatment:  Tolerated the treatment well. · Compliance with Program/Exercises: Will assess as treatment progresses. · Recommendations/Intent for next treatment session: \"Next visit will focus on advancements to more challenging activities\".   Total Treatment Duration: 33 min  PT Patient Time In/Time Out  Time In: 6610  Time Out: [place], PT

## 2018-10-02 NOTE — PROGRESS NOTES
10/2/2018 Saw the patient with Dr Jeff Duran. She is doing well but having some significant hot flashes. We will increase her effexor to 150mg. We discussed the survivorship care plan and this was given. Navigation will sign off.

## 2018-10-16 ENCOUNTER — HOSPITAL ENCOUNTER (OUTPATIENT)
Dept: PHYSICAL THERAPY | Age: 68
Discharge: HOME | End: 2018-10-16
Payer: COMMERCIAL

## 2018-10-16 PROCEDURE — 97110 THERAPEUTIC EXERCISES: CPT

## 2018-10-16 NOTE — PROGRESS NOTES
Osiris Saleh 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 5 = High Risk)  MD Orders: oncology rehab MEDICAL/REFERRING DIAGNOSIS:  Malignant neoplasm of unspecified site of right female breast [C50.911]    DATE OF ONSET: 18  REFERRING PHYSICIAN: Anthony Riojas NP  RETURN PHYSICIAN APPOINTMENT: 3/19  Dr. Enrique Mcdowell:  Ms. Scot Azevedo presents following treatment of  Right breast cancer. She originally was diagnosed in [address] [place] [address]. She underwent a lumpectomy and was placed on tamoxifen. She developed cancer again in . She underwent a mastectomy 18. She had 4 nodes removed. She completed radiation . She is currently in the process of a lat flap reconstruction with expander. She will benefit from therapeutic exercises and lymphedema education. PROBLEM LIST (Impacting functional limitations):  1. Decreased Strength  2. Decreased Flexibility/Joint Mobility  3. Edema/Girth  4. Decreased Knowledge of Precautions  5. Decreased Saline with Home Exercise Program INTERVENTIONS PLANNED:  1. Decongestion Therapy  2. Home Exercise Program (HEP)  3. Manual Therapy  4. Range of Motion (ROM)  5. Therapeutic Exercise/Strengthening   TREATMENT PLAN:  Effective Dates: 2018 TO 2018 (90 days). Frequency/Duration: 2 times a week for 90 Days. No further visits until surgery is scheduled.   GOALS: (Goals have been discussed and agreed upon with patient.)  Short-Term Functional Goals: Time Frame: 4 weeks  1. The patient will be independent with HEP for ROM within 4 weeks. 2. The patient will have knowledge of signs and symptoms of lymphedema and how to manage within 4 weeks. 3. The patient will gain 10 degrees ROM of the right shoulder within 4 weeks. 4. The patient will be fit with a compression sleeve as needed within 4 weeks. Discharge Goals: Time Frame: 8 weeks  1. The patient will have full ROM of the right shoulder within 8 weeks. Rehabilitation Potential For Stated Goals: Good  Regarding Elder Nipple Lovelist's therapy, I certify that the treatment plan above will be carried out by a therapist or under their direction. Thank you for this referral,  Bigg Braswell PT     Referring Physician Signature: Nidia Nyhan, NP              Date                    The information in this section was collected on 9/18/18 (except where otherwise noted). HISTORY:   History of Present Injury/Illness (Reason for Referral):  Right breast cancer, mastectomy with lat flap reconstruction, risk for lymphedema, decreased ROM, radiation, arimidex  Past Medical History/Comorbidities:   Ms. Raquel Felix  has a past medical history of Abnormal results of thyroid function studies; Anemia; Anxiety; BPV (benign positional vertigo); Breast cancer ([facility] [address] (1999); Breast cancer ([facility] [address] (2018); Chronic depression; Chronic tension headache; Diabetes mellitus type II, controlled ([facility] [address] Elevated glucose; Encounter for long-term (current) use of high-risk medication; Encounter for long-term (current) use of medications; Fatigue; Heart palpitations; Hematuria; Hot flashes, menopausal; Hyperlipidemia; Hypertension, benign; Insomnia; Menopausal disorder; Microcytosis; and Obesity. She also has no past medical history of Adverse effect of anesthesia; Difficult intubation; Malignant hyperthermia due to anesthesia; Nausea & vomiting; or Pseudocholinesterase deficiency. Ms. Mara Peguero  has a past surgical history that includes hx breast lumpectomy (1999); hx breast biopsy (1999); hx partial hysterectomy (1979); hx cataract removal (Left, 10/2017); hx cataract removal (Right, 11/2017); hx mastectomy (Right, 2/21/2018); and hx breast reconstruction (Right, 2/21/2018). Past Medical History:   Diagnosis Date    Abnormal results of thyroid function studies     Anemia     Anxiety     BPV (benign positional vertigo)     Breast cancer ([facility] [address] 1999    right---radiation and 5 years of tamoxifen--lumpectomy    Breast cancer ([facility] [address] 2018    right    Chronic depression     Chronic tension headache     Diabetes mellitus type II, controlled ([facility] [address]     saxenda- bs: does not check blood sugars.  Elevated glucose     Encounter for long-term (current) use of high-risk medication     Encounter for long-term (current) use of medications     Fatigue     Heart palpitations     Hematuria     Hot flashes, menopausal     Hyperlipidemia     Hypertension, benign     Insomnia     Menopausal disorder     Microcytosis     Obesity      Past Surgical History:   Procedure Laterality Date    HX BREAST BIOPSY  1999    HX BREAST LUMPECTOMY  1999    HX BREAST RECONSTRUCTION Right 2/21/2018    BREAST RECONSTRUCTION RIGHT performed by Ladan Lerma MD at [address] [place] [address] HX CATARACT REMOVAL Left 10/2017     with IOL    HX CATARACT REMOVAL Right 11/2017    with IOL    HX MASTECTOMY Right 2/21/2018    BREAST MASTECTOMY SIMPLE RIGHT performed by Nyla Urbina MD at [facility] w/ ovaries       Social History/Living Environment:     lives with spouse  Prior Level of Function/Work/Activity:  Works full time as a front office/ , desk work  Dominant Side:         RIGHT  Current Medications:       Current Outpatient Prescriptions:     venlafaxine-SR (EFFEXOR-XR) 150 mg capsule, Take 1 Cap by mouth daily. , Disp: 30 Cap, Rfl: 4    anastrozole (ARIMIDEX) 1 mg tablet, Take 1 Tab by mouth daily. Indications: Hormone Receptor Positive Breast Cancer, Disp: 90 Tab, Rfl: 3    nebivolol (BYSTOLIC) 5 mg tablet, Take 1 Tab by mouth daily. Indications: hypertension (Patient taking differently: Take 5 mg by mouth daily. Indications: hypertension, am- take on the dos), Disp: 90 Tab, Rfl: 3    ALPRAZolam (XANAX) 0.5 mg tablet, Take 1 Tab by mouth nightly. Max Daily Amount: 0.5 mg. 1/2 to 1 for insomnia (Patient taking differently: Take 0.5 mg by mouth nightly as needed. 1/2 to 1 for insomnia  Indications: anxiety), Disp: 90 Tab, Rfl: 1    spironolactone (ALDACTONE) 25 mg tablet, Take 1 Tab by mouth daily. (Patient taking differently: Take 25 mg by mouth daily. Indications: am), Disp: 90 Tab, Rfl: 3    liraglutide (SAXENDA) 3 mg/0.5 mL (18 mg/3 mL) pen, 0.5 mL by SubCUTAneous route daily. (Patient taking differently: 3 mg by SubCUTAneous route every evening. Indications: takes for diabetes management), Disp: 15 Pen, Rfl: 3    Insulin Needles, Disposable, (ARIANA PEN NEEDLE) 32 gauge x 5/32\" ndle, 1 Pen Needle by Does Not Apply route daily. , Disp: 90 Pen Needle, Rfl: 3    amLODIPine (NORVASC) 5 mg tablet, Take 1 Tab by mouth daily. (Patient taking differently: Take 5 mg by mouth daily. Indications: am- take on the dos), Disp: 90 Tab, Rfl: 3    butalbital-acetaminophen-caffeine (FIORICET, ESGIC) -40 mg per tablet, Take 1-2 Tabs by mouth four (4) times daily as needed for Pain. Max Daily Amount: 8 Tabs. (Patient taking differently: Take 1-2 Tabs by mouth four (4) times daily as needed for Pain. Indications: Migraine), Disp: 30 Tab, Rfl: 1    fluticasone (FLONASE) 50 mcg/actuation nasal spray, 2 Sprays by Both Nostrils route daily. (Patient taking differently: 2 Sprays by Both Nostrils route daily as needed. Indications: Allergic Rhinitis), Disp: 1 Bottle, Rfl: 0    aspirin 81 mg chewable tablet, Take 81 mg by mouth daily.  Indications: myocardial infarction prevention, am- hold until after surgery, Disp: , Rfl:     Calcium-Vitamin D3-Vitamin K (VIACTIV) 709-364-40 mg-unit-mcg chew, Take 1 Tab by mouth two (2) times a day. Indications: HYPOCALCEMIA PREVENTION, hold until after surgery, Disp: , Rfl:    Date Last Reviewed:  9/18/18   Number of Personal Factors/Comorbidities that affect the Plan of Care: 1-2: MODERATE COMPLEXITY   EXAMINATION:   Palpation:          Tissue very tight around expanders and right lateral trunk region  ROM:          Right flexion 165, abduction 170, external rotation 90  Strength:          Grossly 4/5 x 4 extremties  Functional Mobility:         Gait/Ambulation:  independent        Transfers:  independent        Bed Mobility:  independent  Skin Integrity:          intact  Edema/Girth:  non-pitting    Left Right    Initial Most Recent Initial Most Recent   Upper  Extremity     Base 3rd Finger (cm): 19.5  Wrist (cm): 16.8  Mid Forearm (cm): 20.5  Elbow (cm): 26.5  Axilla (cm): 28.8 (32.5, 35)     Lower  Extremity               Body Structures Involved:  1. Joints  2. Muscles  3. lymphatic system Body Functions Affected:  1. Sensory/Pain  2. Neuromusculoskeletal Activities and Participation Affected:  1. None   Number of elements (examined above) that affect the Plan of Care: 4+: HIGH COMPLEXITY   CLINICAL PRESENTATION:   Presentation: Stable and uncomplicated: LOW COMPLEXITY   CLINICAL DECISION MAKING:   Outcome Measure: Tool Used: Disabilities of the Arm, Shoulder and Hand (DASH) Questionnaire - Quick Version  Score:  Initial: 24/55  Most Recent: X/55 (Date: -- )   Interpretation of Score: The DASH is designed to measure the activities of daily living in person's with upper extremity dysfunction or pain. Each section is scored on a 1-5 scale, 5 representing the greatest disability. The scores of each section are added together for a total score of 55. Score 11 12-19 20-28 29-37 38-45 46-54 55   Modifier CH CI CJ CK CL CM CN     ?  Carrying, Moving, and Handling Objects:     - CURRENT STATUS: CJ - 20%-39% impaired, limited or restricted    - GOAL STATUS: CI - 1%-19% impaired, limited or restricted    - D/C STATUS:  ---------------To be determined---------------      Tool Used: ECOG Performance Survey Score  Score:  Initial: 1 Most Recent:      Interpretation of Score:   0 Fully active, able to carry on all pre-disease performance without restriction   1 Restricted in physically strenuous activity but ambulatory and able to carry out work of a light or sedentary nature, e.g., light house work, office work   2 Ambulatory and capable of all selfcare but unable to carry out any work activities. Up and about more than 50% of waking hours   3 Capable of only limited selfcare, confined to bed or chair more than 50% of waking hours   4 Completely disabled. Cannot carry on any selfcare. Totally confined to bed or chair   5 Dead        Medical Necessity:   · Patient is expected to demonstrate progress in strength, range of motion and edema management to increase independence with self care and household activity. Reason for Services/Other Comments:  · Patient continues to demonstrate capacity to improve strength, ROM and edema management  which will increase independence. Use of outcome tool(s) and clinical judgement create a POC that gives a: Clear prediction of patient's progress: LOW COMPLEXITY            TREATMENT:   (In addition to Assessment/Re-Assessment sessions the following treatments were rendered)  Pre-treatment Symptoms/Complaints:  Limited ROM, risk for lymphedema. The patient resumed the gym last week and tolerated it well. She reports she is working out regularly. Pain: Initial:     0/10 Post Session:  0/10     as below   Fatigue 0/10   wand external rotation, wand flexion, chicken wing, trunk rotation, sword of hope, standing moose and scapular retraction x 10 reps with 5 count hold, to increase to 10 reps.   Instructed in bilateral upper extremities with 2# x 10 reps bicep curls, abduction, forward flexion, triceps extension, bent over rows. Girth assessed. ROM assessed. [employer] Portal  Treatment/Session Assessment:    · Response to Treatment:  Tolerated the treatment well. ROM and girth improved. · Compliance with Program/Exercises: Will assess as treatment progresses. · Recommendations/Intent for next treatment session:  No further visits until patient is scheduled for her reconstruction. We will check girth before surgery and address any lymphedema issues post surgery. She will continue on her own.   Total Treatment Duration: 20 min  PT Patient Time In/Time Out  Time In: 3668  Time Out: 0818    Cole Sandhoff, PT

## 2019-01-08 ENCOUNTER — HOSPITAL ENCOUNTER (OUTPATIENT)
Dept: LAB | Age: 69
Discharge: HOME | End: 2019-01-08
Payer: MEDICARE

## 2019-01-08 DIAGNOSIS — Z85.3 HISTORY OF BREAST CANCER: ICD-10-CM

## 2019-01-08 LAB
ALBUMIN SERPL-MCNC: 3.5 G/DL (ref 3.2–4.6)
ALBUMIN/GLOB SERPL: 0.8 {RATIO} (ref 1.2–3.5)
ALP SERPL-CCNC: 83 U/L (ref 50–136)
ALT SERPL-CCNC: 26 U/L (ref 12–65)
ANION GAP SERPL CALC-SCNC: 5 MMOL/L (ref 7–16)
AST SERPL-CCNC: 18 U/L (ref 15–37)
BASOPHILS # BLD: 0.1 K/UL (ref 0–0.2)
BASOPHILS NFR BLD: 1 % (ref 0–2)
BILIRUB SERPL-MCNC: 0.4 MG/DL (ref 0.2–1.1)
BUN SERPL-MCNC: 10 MG/DL (ref 8–23)
CALCIUM SERPL-MCNC: 9.8 MG/DL (ref 8.3–10.4)
CHLORIDE SERPL-SCNC: 107 MMOL/L (ref 98–107)
CO2 SERPL-SCNC: 28 MMOL/L (ref 21–32)
CREAT SERPL-MCNC: 1.26 MG/DL (ref 0.6–1)
DIFFERENTIAL METHOD BLD: ABNORMAL
EOSINOPHIL # BLD: 0 K/UL (ref 0–0.8)
EOSINOPHIL NFR BLD: 1 % (ref 0.5–7.8)
ERYTHROCYTE [DISTWIDTH] IN BLOOD BY AUTOMATED COUNT: 15.5 % (ref 11.9–14.6)
GLOBULIN SER CALC-MCNC: 4.2 G/DL (ref 2.3–3.5)
GLUCOSE SERPL-MCNC: 92 MG/DL (ref 65–100)
HCT VFR BLD AUTO: 39.3 % (ref 35.8–46.3)
HGB BLD-MCNC: 12 G/DL (ref 11.7–15.4)
IMM GRANULOCYTES # BLD: 0 K/UL (ref 0–0.5)
IMM GRANULOCYTES NFR BLD AUTO: 1 % (ref 0–5)
LYMPHOCYTES # BLD: 1.5 K/UL (ref 0.5–4.6)
LYMPHOCYTES NFR BLD: 28 % (ref 13–44)
MCH RBC QN AUTO: 23.7 PG (ref 26.1–32.9)
MCHC RBC AUTO-ENTMCNC: 30.5 G/DL (ref 31.4–35)
MCV RBC AUTO: 77.5 FL (ref 79.6–97.8)
MONOCYTES # BLD: 0.8 K/UL (ref 0.1–1.3)
MONOCYTES NFR BLD: 15 % (ref 4–12)
NEUTS SEG # BLD: 3 K/UL (ref 1.7–8.2)
NEUTS SEG NFR BLD: 55 % (ref 43–78)
NRBC # BLD: 0 K/UL (ref 0–0.2)
PLATELET # BLD AUTO: 222 K/UL (ref 150–450)
PMV BLD AUTO: 8.9 FL (ref 9.4–12.3)
POTASSIUM SERPL-SCNC: 4 MMOL/L (ref 3.5–5.1)
PROT SERPL-MCNC: 7.7 G/DL (ref 6.3–8.2)
RBC # BLD AUTO: 5.07 M/UL (ref 4.05–5.25)
SODIUM SERPL-SCNC: 140 MMOL/L (ref 136–145)
WBC # BLD AUTO: 5.5 K/UL (ref 4.3–11.1)

## 2019-01-08 PROCEDURE — 85025 COMPLETE CBC W/AUTO DIFF WBC: CPT

## 2019-01-08 PROCEDURE — 80053 COMPREHEN METABOLIC PANEL: CPT

## 2019-01-08 PROCEDURE — 36415 COLL VENOUS BLD VENIPUNCTURE: CPT

## 2019-02-20 ENCOUNTER — HOSPITAL ENCOUNTER (OUTPATIENT)
Dept: MAMMOGRAPHY | Age: 69
Discharge: HOME | End: 2019-02-20
Attending: NURSE PRACTITIONER
Payer: MEDICARE

## 2019-02-20 DIAGNOSIS — M81.0 OSTEOPOROSIS WITHOUT CURRENT PATHOLOGICAL FRACTURE, UNSPECIFIED OSTEOPOROSIS TYPE: ICD-10-CM

## 2019-02-20 DIAGNOSIS — C50.911 MALIGNANT NEOPLASM OF RIGHT BREAST IN FEMALE, ESTROGEN RECEPTOR POSITIVE, UNSPECIFIED SITE OF BREAST (HCC): ICD-10-CM

## 2019-02-20 DIAGNOSIS — Z17.0 MALIGNANT NEOPLASM OF RIGHT BREAST IN FEMALE, ESTROGEN RECEPTOR POSITIVE, UNSPECIFIED SITE OF BREAST (HCC): ICD-10-CM

## 2019-02-20 DIAGNOSIS — Z12.39 BREAST CANCER SCREENING: ICD-10-CM

## 2019-02-20 DIAGNOSIS — Z12.9 SCREENING FOR CANCER: ICD-10-CM

## 2019-02-20 PROCEDURE — 77067 SCR MAMMO BI INCL CAD: CPT

## 2019-02-20 PROCEDURE — 77080 DXA BONE DENSITY AXIAL: CPT

## 2019-03-07 ENCOUNTER — HOSPITAL ENCOUNTER (OUTPATIENT)
Dept: RADIATION ONCOLOGY | Age: 69
Discharge: HOME | End: 2019-03-07
Payer: MEDICARE

## 2019-03-07 VITALS
SYSTOLIC BLOOD PRESSURE: 118 MMHG | TEMPERATURE: 98 F | BODY MASS INDEX: 31.48 KG/M2 | HEART RATE: 82 BPM | OXYGEN SATURATION: 96 % | WEIGHT: 166.6 LBS | DIASTOLIC BLOOD PRESSURE: 69 MMHG

## 2019-03-07 PROCEDURE — 99211 OFF/OP EST MAY X REQ PHY/QHP: CPT

## 2019-03-07 NOTE — NURSE NAVIGATOR
F/u right breast cancer. History lumpectomy, RT, Tamoxifen x 5 years. S/p right mastectomy. RT end 8-2-18. Went to oncology rehab for PT. Has area to right upper back that is tender and sore and feels like an itch she cannot scratch. Mammogram, bone density 2/19. Taking Arimidex daily.     Ara Shi RN

## 2019-03-07 NOTE — PROGRESS NOTES
Patient: Guanako Love MRN: [id number]  SSN: [id number]    YOB: 1950  Age: 70 y.o. Sex: female      Other Providers:  Sailaja Romero MD    CHIEF COMPLAINT: Breast cancer    DIAGNOSIS: Right breast invasive ductal carcinoma, [id number] with chest wall invasion, Stage IIA. ER 99%/AR 59% positive, HER-2 negative (0). Prior right sided breast cancer with lumpectomy and radiation (1999). PREVIOUS TREATMENT:  1) 2/21/18:  Right breast mastectomy with sentinel node biopsy  2) Radiation of the right chest wall. DOSE:  5000 cGy in 25 fractions right chest wall, 1000 cGy in 5 fractions right medial chest wall boost, 1000 cGy in 5 fractions right lateral chest wall boost. Total 7000 cGy. TREATMENT DATES:  6/20/2018 - 8/2/2018. HISTORY OF PRESENT ILLNESS:  Guanako Love is a 70 y.o. female seen initially seen by Dr. Selin Crocker at the request of Dr. Angela Lawson. She has a history of lumpectomy in 1999 for breast cancer which was treated adjuvantly with radiation and 5 years of tamoxifen at [facility]. She presented for routine screening mammogram on 11/15/17 which identified a new, incompletely characterized, posterior right breast mass. Right breast ultrasound on 11/17/18 confirmed a 2.4 cm x 1 cm x 1.8 cm hypoechoic abnormality at the level of a prior scar. MRI 11/28/17 demonstrated a 2.1 cm x 1.6 cm x 1.7 cm enhancing mass at the level of the patient's right breast lumpectomy bed concerning for locally recurrent malignancy with evidence for involvement of the directly adjacent pectoralis muscle. Biopsy showed grade 1 IDC with lobular features, ER 99%/AR 59% positive, HER-2 negative (0), low grade, well differentiated, infiltrating duct carcinoma with lobular features in association with ductal carcinoma in situ, intermediate grade (cribriform type), with no definite lymphovascular invasion identified.   Secondary to her previous right breast cancer and treatment with radiation, patient was recommended for mastectomy and sentinel node biopsy completed 2/21/18. This showed the tumor to be 2.4 cm in greatest dimension with carcinoma less than 0.1 cm from marked deep margin (invading skeletal muscle). All 4 nodes were negative for metastatic carcinoma. She was referred to oncology for evaluation and treatment of her newly diagnosed right breast cancer and saw Dr. Tanya Mcrae 3/5/18 who recommended Oncotype for consideration of chemotherapy and an AI. She is completing expansions with Dr. Yanet Sotomayor and had drains in place at time of initial consultation. INTERVAL HISTORY: Ms Natalya Garrison returns today 7 months after completing radiation therapy of the right chest wall. Now with right breast expander. She tolerated radiation fairly well. She reports having itching on her back around the incision site, otherwise no complaints. Denies any residual side effects related to previous radiation. Good energy level. Continues to take Arimidex. Effexor for hot flashes with little relief. Recent mammogram negative for malignancy. OBSTETRIC HISTORY:    Menarche at the age of 14.    G4,P2. Oral Contraceptive Pills:  7 years  Hormone Replacement Therapy:  \"Briefly\"  Hysterectomy in 1979. PAST MEDICAL HISTORY:    Past Medical History:   Diagnosis Date    Abnormal results of thyroid function studies     Anemia     Anxiety     BPV (benign positional vertigo)     Breast cancer ([facility] [address] 1999    right---radiation and 5 years of tamoxifen--lumpectomy    Breast cancer ([facility] [address] 2018    right    Chronic depression     Chronic tension headache     Diabetes mellitus type II, controlled ([facility] [address]     saxenda- bs: does not check blood sugars.      Elevated glucose     Encounter for long-term (current) use of high-risk medication     Encounter for long-term (current) use of medications     Fatigue     Heart palpitations     Hematuria     Hot flashes, menopausal     Hyperlipidemia     Hypertension, benign     Insomnia     Menopausal disorder     Microcytosis     Obesity      The patient denies history of collagen vascular diseases, pacemaker insertion, prior radiation or prior chemotherapy. PAST SURGICAL HISTORY:   Past Surgical History:   Procedure Laterality Date    HX BREAST BIOPSY  1999    HX BREAST LUMPECTOMY  1999    HX BREAST RECONSTRUCTION Right 2/21/2018    BREAST RECONSTRUCTION RIGHT performed by Mart Moser MD at [facility] MAIN OR    HX CATARACT REMOVAL Left 10/2017     with IOL    HX CATARACT REMOVAL Right 11/2017    with IOL    HX MASTECTOMY Right 2/21/2018    BREAST MASTECTOMY SIMPLE RIGHT performed by Paco Jaquez MD at [facility] MAIN OR     [address]    still w/ ovaries     MEDICATIONS:     Current Outpatient Medications:     vitamin e (E GEMS) 1,000 unit capsule, Take 1,000 Units by mouth daily. , Disp: , Rfl:     anastrozole (ARIMIDEX) 1 mg tablet, Take 1 mg by mouth daily. , Disp: 90 Tab, Rfl: 3    venlafaxine-SR (EFFEXOR-XR) 150 mg capsule, Take 1 Cap by mouth daily. , Disp: 90 Cap, Rfl: 3    nebivolol (BYSTOLIC) 5 mg tablet, Take 1 Tab by mouth daily. Indications: hypertension (Patient taking differently: Take 5 mg by mouth daily. Indications: hypertension, am- take on the dos), Disp: 90 Tab, Rfl: 3    ALPRAZolam (XANAX) 0.5 mg tablet, Take 1 Tab by mouth nightly. Max Daily Amount: 0.5 mg. 1/2 to 1 for insomnia (Patient taking differently: Take 0.5 mg by mouth nightly as needed. 1/2 to 1 for insomnia  Indications: anxiety), Disp: 90 Tab, Rfl: 1    spironolactone (ALDACTONE) 25 mg tablet, Take 1 Tab by mouth daily. (Patient taking differently: Take 25 mg by mouth daily. Indications: am), Disp: 90 Tab, Rfl: 3    liraglutide (SAXENDA) 3 mg/0.5 mL (18 mg/3 mL) pen, 0.5 mL by SubCUTAneous route daily. (Patient taking differently: 3 mg by SubCUTAneous route every evening.  Indications: takes for diabetes management), Disp: 15 Pen, Rfl: 3    Insulin Needles, Disposable, (ARIANA PEN NEEDLE) 32 gauge x 5/32\" ndle, 1 Pen Needle by Does Not Apply route daily. , Disp: 90 Pen Needle, Rfl: 3    amLODIPine (NORVASC) 5 mg tablet, Take 1 Tab by mouth daily. (Patient taking differently: Take 5 mg by mouth daily. Indications: am- take on the dos), Disp: 90 Tab, Rfl: 3    butalbital-acetaminophen-caffeine (FIORICET, ESGIC) -40 mg per tablet, Take 1-2 Tabs by mouth four (4) times daily as needed for Pain. Max Daily Amount: 8 Tabs. (Patient taking differently: Take 1-2 Tabs by mouth four (4) times daily as needed for Pain. Indications: Migraine), Disp: 30 Tab, Rfl: 1    fluticasone (FLONASE) 50 mcg/actuation nasal spray, 2 Sprays by Both Nostrils route daily. (Patient taking differently: 2 Sprays by Both Nostrils route daily as needed. Indications: Allergic Rhinitis), Disp: 1 Bottle, Rfl: 0    aspirin 81 mg chewable tablet, Take 81 mg by mouth daily. Indications: myocardial infarction prevention, am- hold until after surgery, Disp: , Rfl:     Calcium-Vitamin D3-Vitamin K (VIACTIV) 183-300-10 mg-unit-mcg chew, Take 1 Tab by mouth two (2) times a day.  Indications: HYPOCALCEMIA PREVENTION, hold until after surgery, Disp: , Rfl:     ALLERGIES:   Allergies   Allergen Reactions    Ambien [Zolpidem] Other (comments)     Makes her dream    Buspar [Buspirone] Vertigo    Iron Other (comments)     constipation    Macrodantin [Nitrofurantoin Macrocrystalline] Unknown (comments)    Milk Other (comments)     Causes stomach problems       SOCIAL HISTORY:   Social History     Socioeconomic History    Marital status:      Spouse name: Not on file    Number of children: Not on file    Years of education: Not on file    Highest education level: Not on file   Social Needs    Financial resource strain: Not on file    Food insecurity - worry: Not on file    Food insecurity - inability: Not on file   [employer] needs - medical: Not on file   [employer] needs - non-medical: Not on file   Occupational History    Not on file   Tobacco Use    Smoking status: Never Smoker    Smokeless tobacco: Never Used   Substance and Sexual Activity    Alcohol use: No    Drug use: No    Sexual activity: Not Currently   Other Topics Concern    Not on file   Social History Narrative    Not on file       FAMILY HISTORY:   Family History   Problem Relation Age of Onset    Uterine Cancer Mother     Hypertension Mother     Stroke Father     Cancer Maternal Grandfather         Lung     No Known Problems Paternal Grandmother     No Known Problems Paternal Grandfather     Breast Cancer Neg Hx      PHYSICAL EXAMINATION:   ECOG Performance status 0  VITAL SIGNS: blood pressure  118/69  Pulse 82  Temperature 98  Weight 166.6     GENERAL: The patient is well-developed, ambulatory, alert and in no acute distress. LYMPHATIC: There is no cervical, supraclavicular or axillary lymphadenopathy bilaterally. BREASTS: Examination of the unaffected breast reveals no dominant nodules or masses. BREAST: Examination of the right chest wall/expander shows with healed skin incisions. PATHOLOGY:    2/21/18:     DIAGNOSIS   A: RIGHT MASTECTOMY: INFILTRATING DUCT CARCINOMA WITH LOBULAR AND MICROPAPILLARY FEATURES, INTERMEDIATE GRADE (MODERATELY DIFFERENTIATED) MEASURING APPROXIMATELY 2.4 X 2.1 X 1.7 CM. AREA SUSPICIOUS FOR LYMPHOVASCULAR INVASION. INFILTRATING CARCINOMA IS PRESENT LESS THAN 0.1 CM FROM MARKED DEEP MARGIN (INVADING SKELETAL MUSCLE). OTHER MARGINS ARE GREATER THAN 1 CM FROM TUMOR. DEFINITE IN SITU COMPONENT IS NOT IDENTIFIED. CHANGES CONSISTENT WITH PRIOR BIOPSY SITE (SEE [id number]). SEE COMMENT. B: RIGHT SENTINEL LYMPH NODE #1: TWO LYMPH NODES NEGATIVE FOR METASTATIC TUMOR. C: RIGHT SENTINEL LYMPH NODE #2: LYMPH NODE NEGATIVE FOR METASTATIC CARCINOMA. D: RIGHT NONSENTINEL LYMPH NODE: ADIPOSE TISSUE WITHOUT IDENTIFIABLE LYMPH NODE. E: RIGHT SENTINEL LYMPH NODE #3: LYMPH NODE NEGATIVE FOR METASTATIC CARCINOMA.    Comment Infiltrating carcinoma in this specimen is consistent with that present on prior core biopsy, [id number], which has estrogen receptors of 99%, progesterone receptors of 59% and Her-2 negative. If clinically indicated, receptor studies can be repeated in this material.      A: ANATOMIC SITE: Right breast (presumably 6 o'clock position, 8 cm from nipple based on prior core biopsy). PROCEDURE: Mastectomy and sentinel node excision. HISTOLOGIC TYPE: Infiltrating duct carcinoma with lobular and micropapillary features. SIZE: Approximately 2.4 x 2.1 x 1.7 cm. UNIFOCAL OR MULTIFOCAL: Apparently unifocal.   PRESENCE OF SKIN, NIPPLE OR SKELETAL MUSCLE INVOLVEMENT: Skeletal muscle is involved in area of deep margin. RITCHIE MODIFICATION OF BLOOM-LOPEZ GRADE:   ARCHITECTURAL SCORE: 2/3   NUCLEAR SCORE: 3/3   MITOTIC SCORE: 2/3   TOTAL SCORE: 7/9 = Intermediate grade. IN-SITU COMPONENT: Not definitely identified. LYMPHOVASCULAR INVASION: Area suspicious for lymphovascular invasion. ARE MICROCALCIFICATIONS IDENTIFIED: Yes.   TUMOR DISTANCE TO CLOSEST MARGIN: Deep margin less than 0.1 cm. ANTERIOR (SUPERFICIAL): Greater than 1 cm. POSTERIOR (DEEP): Less than 0.1 cm. MEDIAL: Greater than 1 cm. LATERAL: Greater than 1 cm. INFERIOR: Greater than 1 cm. SUPERIOR: Greater than 1 cm. LYMPH NODE STATUS:   TOTAL NUMBER OF LYMPH NODES CONTAINING CARCINOMA: 0   TOTAL NUMBER OF LYMPH NODES EXAMINED: 4   SIZE OF LARGEST POSITIVE NODE: Does not apply. EXTRA-CAPSULAR EXTENSION OF TUMOR: Does not apply. OTHER FINDINGS: Changes consistent with prior biopsy site.      LABORATORY:   Lab Results   Component Value Date/Time    Sodium 140 01/08/2019 02:15 PM    Potassium 4.0 01/08/2019 02:15 PM    Chloride 107 01/08/2019 02:15 PM    CO2 28 01/08/2019 02:15 PM    Anion gap 5 (L) 01/08/2019 02:15 PM    Glucose 92 01/08/2019 02:15 PM    BUN 10 01/08/2019 02:15 PM    Creatinine 1.26 (H) 01/08/2019 02:15 PM    GFR est AA 54 (L) 01/08/2019 02:15 PM    GFR est non-AA 45 (L) 01/08/2019 02:15 PM    Calcium 9.8 01/08/2019 02:15 PM    Magnesium 2.1 05/23/2017 08:40 AM    Albumin 3.5 01/08/2019 02:15 PM    Protein, total 7.7 01/08/2019 02:15 PM    Globulin 4.2 (H) 01/08/2019 02:15 PM    A-G Ratio 0.8 (L) 01/08/2019 02:15 PM    AST (SGOT) 18 01/08/2019 02:15 PM    ALT (SGPT) 26 01/08/2019 02:15 PM     Lab Results   Component Value Date/Time    WBC 5.5 01/08/2019 02:15 PM    HGB 12.0 01/08/2019 02:15 PM    HCT 39.3 01/08/2019 02:15 PM    PLATELET 412 48/55/7005 02:15 PM       RADIOLOGY:      BILATERAL SCREENING MAMMOGRAM, 11/15/2017.     CLINICAL HISTORY: Routine screening mammogram.     Comparison studies:  Screening mammograms 7/20/2016, and 7/15/2015.     FINDINGS:     Bilateral digital screening mammography, interpreted in conjunction with CAD  overread. The breasts are composed of scattered fibroglandular elements. The  axilla contain benign appearing lymph nodes. No evidence of evolving skin  thickening, or nipple retraction is seen. Scattered punctate, and round  appearing calcifications are seen which are not felt to be worrisome given their  scattered distribution and typically benign appearance. No evolving unique  clustered microcalcifications are seen to suggest a worrisome process. A new  posterior right breast mass is partially visualized. This is felt to reside at  approximately the 9:00 position of the right breast located 10 cm from the  nipple. Otherwise, no evolving dominant mass, spiculated density, or  architectural distortion is seen.     IMPRESSION:  1. New posterior right breast mass which is incompletely characterized. This  appears to be a true finding and not superimposed shadows.  Therefore, further  evaluation with focused ultrasound is recommended.     We are mailing breast density information to the patient along with the  mammogram report.     A reminder letter will be scheduled at the appropriate time pending additional  views.     BI-RADS Assessment Category 0: Incomplete: Needs additional imaging evaluation. RIGHT BREAST ULTRASOUND, 11/17/2017.     CLINICAL HISTORY: Right breast abnormality seen on screening mammogram.     FINDINGS:     An ultrasound abnormality is seen at the 6:00 position of the right breast  located 8 cm from the nipple felt to correspond to the prior mammogram  abnormality. This was previously described at 9:00 based on an MLO view. The  location is felt to be more accurately described by ultrasound. At this level,  there is a hypoechoic area measuring 2.4 cm x 1 cm x 1.8 cm in size which occurs  at the level of a prior right breast scar. Although this very well may represent  benign scar tissue, this does appear to be a new finding by mammography and has  not been demonstrated on any prior mammogram dating as far back as June 2005. Therefore, this felt that this should be more definitively confirmed by breast  MRI.     IMPRESSION:   1. 2.4 cm x 1 cm x 1.8 cm hypoechoic abnormality at the level of a prior scar  which may represent scar tissue although should be further characterized given  that this does appear to have demonstrated evolution by mammography. This would  be best performed with a contrasted bilateral breast MRI.     A reminder letter will be scheduled at the appropriate time pending additional  views.     BI-RADS Assessment Category 0: Incomplete: Needs additional imaging evaluation. MRI BILATERAL BREASTS WITHOUT AND WITH CONTRAST, 11/28/2017.      CLINICAL HISTORY:  50-year-old with history of prior right breast cancer in 1999  treated with lumpectomy and radiation. Presents for further assessment of  ultrasound abnormality seen at level of prior scar.  No current breast  complaints.     Sequences obtained: Sagittal T2, axial STIR, axial T1, and axial fat-saturated  T1-weighted images prior to and following administration of contrast.      Comparison studies: Breast MRI 11/13/2008, and bilateral mammogram 11/15/2017.      FINDINGS:   The breasts are composed of heterogeneous fibroglandular elements. No enlarged  axillary lymph nodes are seen. No enlarged internal mammary lymph nodes are  seen. Evaluation of the lungs is limited by  MRI imaging. However, no obvious  pulmonary masses are seen. No significant pleural effusions are seen. The liver  is obscured by cardiac motion artifact and only partially visualized limiting  assessment. Multiple T2 hyperintense lesions are seen. Similar lesions were  seen on the prior MRI study in these grossly do not appear to enhance following  contrast administration and therefore favored to represent benign cysts.      Following the administration of intravenous contrast, normal enhancement is seen  of the heart and vascular structures of the breasts. Asymmetric enhancement is  seen of the breasts with mild to moderate left, and no significant right  background enhancement. This is likely due to prior right breast radiation as  indicated in the patient's clinical history. At the 7:00 position of the right  breast centered 9.4 cm from the nipple there is an enhancing mass measuring 2.1  cm x 1.6 cm x 1.7 cm in size at the level of the patient's prior lumpectomy scar  which is highly concerning for locally recurrent disease. There is abnormal  enhancement seen in the directly adjacent pectoralis muscle best appreciated on  postcontrast image 200 consistent with pectoralis muscle involvement. No  dominant enhancing masses, or worrisome patterns of enhancement are otherwise  seen. Within again, there is asymmetric nodular enhancement in the left breast  without a dominant enhancing mass or segmental area of asymmetrically prominent  nonmasslike enhancement to suggest a worrisome process. No nodule definitely  demonstrating washout is demonstrated. No evidence of skin thickening, or  nipple retraction is seen.  No enhancing osseous lesion is seen in the visualized  chest.    IMPRESSION:  1.  2.1 cm x 1.6 cm x 1.7 cm enhancing mass at the level of the patient's right  breast lumpectomy bed concerning for locally recurrent malignancy. There is  evidence for involvement of the directly adjacent pectoralis muscle. Further  evaluation with ultrasound-guided biopsy is recommended given that a prior  diagnostic ultrasound has demonstrated an abnormal lesion at this level. No  additional enhancing abnormalities are seen to suggest potential multifocal or  multicentric disease.     2. No evidence for metastatic disease in the visualized chest.     BI-RADS Assessment Category 4: Suspicious Finding- Biopsy should be considered. Unilateral digital screening mammogram  2/20/2019     INDICATION:  Screening;  previous right mastectomy for breast cancer     COMPARISON:  11/15/2017 and others going back to 11/17/2009     FINDINGS: Left digital screening mammography was performed, and is interpreted  in conjunction with a computer assisted detection (CAD) system.       There are scattered fibroglandular densities.       No suspicious masses, calcifications, or architectural distortion are  identified. There is no skin thickening or nipple retraction.      IMPRESSION:  No mammographic evidence of malignancy.       Recommend annual mammogram in one year. A reminder letter will be scheduled.     BI-RADS Assessment Category 1: Negative. BONE DENSITOMETRY: 2/20/2019     CLINICAL HISTORY: Postmenopausal screening on Arimidex after breast cancer.     FINDINGS:  Without prior study for comparison, bone mineral density in the  lumbar spine at L1-2  was measured at 1.530 g per square centimeter with a T  score of 2.9. Density elsewhere in the lumbar spine is artifactually elevated  by sclerosis associated with apparent spondylosis. Minimal bone mineral  density at the hips is measured at the left femoral neck  at 1.031  g per square  centimeter with a T score of 0.    The FRAX index suggests a 10-year probability  of a major osteoporotic fracture at 3% and of a hip fracture at 0.1%.     IMPRESSION: Normal bone mineral density. IMPRESSION:  Melanie Velasco is a 70 y.o. female with Stage IIA breast cancer s/p right breast mastectomy. She completed radiation of the right chest wall, 8/2/2018. Ms Laura Spear is now 7 months after completing radiation therapy. She is recovering as anticipated from radiation therapy. Now with right breast expander. She reports having itching on her back around the incision site, otherwise no complaints. Denies any residual side effects related to previous radiation. Mammogram on 2/20/19 shows no evidence of malignancy. She also underwent a bone density which was normal. Continues on Arimidex. PLAN:   1) Endocrine therapy under the direction of Dr. Canelo Valdez, medical oncology  2) We discussed ongoing surveillance for her breast cancer which would include follow up in 6 months. If no changes will most likely sign off from her care with the understanding to continue with medical oncology. All questions were answered to the best of my ability. RTC 6 months. 3) I spent 25 minutes in the care of Ms Laura Spear today, over 50% of which was in direct counseling and ongoing management of her right breast cancer s/p right breast mastectomy. All questions were answered to the best of my ability. 4) Encouraged SBE  5) Skin itching: try benadryl cream or hydrocortisone cream.     Veronika Bernard NP  03/07/19      Portions of this note were copied from prior encounters and reviewed for accuracy, currency, and represent documentation and tasks completed during this encounter. I verify and attest these portions to be unchanged from prior visits.

## 2019-04-08 ENCOUNTER — HOSPITAL ENCOUNTER (OUTPATIENT)
Dept: LAB | Age: 69
Discharge: HOME | End: 2019-04-08
Payer: MEDICARE

## 2019-04-08 DIAGNOSIS — Z17.0 MALIGNANT NEOPLASM OF RIGHT BREAST IN FEMALE, ESTROGEN RECEPTOR POSITIVE, UNSPECIFIED SITE OF BREAST (HCC): ICD-10-CM

## 2019-04-08 DIAGNOSIS — C50.911 MALIGNANT NEOPLASM OF RIGHT BREAST IN FEMALE, ESTROGEN RECEPTOR POSITIVE, UNSPECIFIED SITE OF BREAST (HCC): ICD-10-CM

## 2019-04-08 DIAGNOSIS — E83.52 HYPERCALCEMIA: ICD-10-CM

## 2019-04-08 LAB
ALBUMIN SERPL-MCNC: 3.6 G/DL (ref 3.2–4.6)
ALBUMIN/GLOB SERPL: 0.9 {RATIO} (ref 1.2–3.5)
ALP SERPL-CCNC: 82 U/L (ref 50–136)
ALT SERPL-CCNC: 30 U/L (ref 12–65)
ANION GAP SERPL CALC-SCNC: 6 MMOL/L (ref 7–16)
AST SERPL-CCNC: 20 U/L (ref 15–37)
BASOPHILS # BLD: 0 K/UL (ref 0–0.2)
BASOPHILS NFR BLD: 1 % (ref 0–2)
BILIRUB SERPL-MCNC: 0.4 MG/DL (ref 0.2–1.1)
BUN SERPL-MCNC: 14 MG/DL (ref 8–23)
CALCIUM SERPL-MCNC: 10.7 MG/DL (ref 8.3–10.4)
CALCIUM SERPL-MCNC: 10.7 MG/DL (ref 8.3–10.4)
CHLORIDE SERPL-SCNC: 106 MMOL/L (ref 98–107)
CO2 SERPL-SCNC: 28 MMOL/L (ref 21–32)
CREAT SERPL-MCNC: 1.2 MG/DL (ref 0.6–1)
DIFFERENTIAL METHOD BLD: ABNORMAL
EOSINOPHIL # BLD: 0.1 K/UL (ref 0–0.8)
EOSINOPHIL NFR BLD: 1 % (ref 0.5–7.8)
ERYTHROCYTE [DISTWIDTH] IN BLOOD BY AUTOMATED COUNT: 15.7 % (ref 11.9–14.6)
GLOBULIN SER CALC-MCNC: 4.1 G/DL (ref 2.3–3.5)
GLUCOSE SERPL-MCNC: 77 MG/DL (ref 65–100)
HCT VFR BLD AUTO: 36.3 % (ref 35.8–46.3)
HGB BLD-MCNC: 11.4 G/DL (ref 11.7–15.4)
IMM GRANULOCYTES # BLD AUTO: 0.1 K/UL (ref 0–0.5)
IMM GRANULOCYTES NFR BLD AUTO: 1 % (ref 0–5)
LYMPHOCYTES # BLD: 1.4 K/UL (ref 0.5–4.6)
LYMPHOCYTES NFR BLD: 30 % (ref 13–44)
MCH RBC QN AUTO: 24.4 PG (ref 26.1–32.9)
MCHC RBC AUTO-ENTMCNC: 31.4 G/DL (ref 31.4–35)
MCV RBC AUTO: 77.7 FL (ref 79.6–97.8)
MONOCYTES # BLD: 0.8 K/UL (ref 0.1–1.3)
MONOCYTES NFR BLD: 18 % (ref 4–12)
NEUTS SEG # BLD: 2.2 K/UL (ref 1.7–8.2)
NEUTS SEG NFR BLD: 48 % (ref 43–78)
NRBC # BLD: 0 K/UL (ref 0–0.2)
PLATELET # BLD AUTO: 208 K/UL (ref 150–450)
PMV BLD AUTO: 9 FL (ref 9.4–12.3)
POTASSIUM SERPL-SCNC: 4 MMOL/L (ref 3.5–5.1)
PROT SERPL-MCNC: 7.7 G/DL (ref 6.3–8.2)
PTH-INTACT SERPL-MCNC: 57.9 PG/ML (ref 18.5–88)
RBC # BLD AUTO: 4.67 M/UL (ref 4.05–5.25)
SODIUM SERPL-SCNC: 140 MMOL/L (ref 136–145)
WBC # BLD AUTO: 4.5 K/UL (ref 4.3–11.1)

## 2019-04-08 PROCEDURE — 80053 COMPREHEN METABOLIC PANEL: CPT

## 2019-04-08 PROCEDURE — 83970 ASSAY OF PARATHORMONE: CPT

## 2019-04-08 PROCEDURE — 84165 PROTEIN E-PHORESIS SERUM: CPT

## 2019-04-08 PROCEDURE — 36415 COLL VENOUS BLD VENIPUNCTURE: CPT

## 2019-04-08 PROCEDURE — 85025 COMPLETE CBC W/AUTO DIFF WBC: CPT

## 2019-04-08 PROCEDURE — 86334 IMMUNOFIX E-PHORESIS SERUM: CPT

## 2019-04-09 LAB
ALBUMIN SERPL ELPH-MCNC: 3.65 G/DL (ref 3.2–5.6)
ALBUMIN/GLOB SERPL: 1 {RATIO}
ALPHA1 GLOB SERPL ELPH-MCNC: 0.25 G/DL (ref 0.1–0.4)
ALPHA2 GLOB SERPL ELPH-MCNC: 0.99 G/DL (ref 0.4–1.2)
B-GLOBULIN SERPL QL ELPH: 1.08 G/DL (ref 0.6–1.3)
GAMMA GLOB MFR SERPL ELPH: 1.43 G/DL (ref 0.5–1.6)
IGA SERPL-MCNC: 215 MG/DL (ref 85–499)
IGG SERPL-MCNC: 1443 MG/DL (ref 610–1616)
IGM SERPL-MCNC: 38 MG/DL (ref 35–242)
M PROTEIN SERPL ELPH-MCNC: 0.59 G/DL
PROT PATTERN SERPL ELPH-IMP: ABNORMAL
PROT PATTERN SPEC IFE-IMP: ABNORMAL
PROT SERPL-MCNC: 7.4 G/DL (ref 6.3–8.2)

## 2019-06-04 ENCOUNTER — ANESTHESIA EVENT (OUTPATIENT)
Dept: SURGERY | Age: 69
End: 2019-06-04
Payer: MEDICARE

## 2019-06-04 ENCOUNTER — ANESTHESIA (OUTPATIENT)
Dept: SURGERY | Age: 69
End: 2019-06-04
Payer: MEDICARE

## 2019-06-04 ENCOUNTER — HOSPITAL ENCOUNTER (OUTPATIENT)
Age: 69
Setting detail: OUTPATIENT SURGERY
Discharge: HOME | End: 2019-06-04
Attending: SURGERY | Admitting: SURGERY
Payer: MEDICARE

## 2019-06-04 VITALS
SYSTOLIC BLOOD PRESSURE: 151 MMHG | HEIGHT: 61 IN | RESPIRATION RATE: 16 BRPM | TEMPERATURE: 97.6 F | HEART RATE: 93 BPM | DIASTOLIC BLOOD PRESSURE: 70 MMHG | WEIGHT: 162 LBS | OXYGEN SATURATION: 96 % | BODY MASS INDEX: 30.58 KG/M2

## 2019-06-04 DIAGNOSIS — C50.911 MALIGNANT NEOPLASM OF RIGHT BREAST IN FEMALE, ESTROGEN RECEPTOR POSITIVE, UNSPECIFIED SITE OF BREAST (HCC): Primary | ICD-10-CM

## 2019-06-04 DIAGNOSIS — Z17.0 MALIGNANT NEOPLASM OF RIGHT BREAST IN FEMALE, ESTROGEN RECEPTOR POSITIVE, UNSPECIFIED SITE OF BREAST (HCC): Primary | ICD-10-CM

## 2019-06-04 LAB
CREAT SERPL-MCNC: 1.26 MG/DL (ref 0.6–1)
GLUCOSE BLD STRIP.AUTO-MCNC: 88 MG/DL (ref 65–100)
HGB BLD-MCNC: 11.4 G/DL (ref 11.7–15.4)
POTASSIUM BLD-SCNC: 4.1 MMOL/L (ref 3.5–5.1)
POTASSIUM SERPL-SCNC: 4.6 MMOL/L (ref 3.5–5.1)

## 2019-06-04 PROCEDURE — 74011250636 HC RX REV CODE- 250/636

## 2019-06-04 PROCEDURE — 84132 ASSAY OF SERUM POTASSIUM: CPT

## 2019-06-04 PROCEDURE — 77030008462 HC STPLR SKN PROX J&J -A: Performed by: SURGERY

## 2019-06-04 PROCEDURE — 76210000006 HC OR PH I REC 0.5 TO 1 HR: Performed by: SURGERY

## 2019-06-04 PROCEDURE — 85018 HEMOGLOBIN: CPT

## 2019-06-04 PROCEDURE — 74011250636 HC RX REV CODE- 250/636: Performed by: ANESTHESIOLOGY

## 2019-06-04 PROCEDURE — 74011250636 HC RX REV CODE- 250/636: Performed by: SURGERY

## 2019-06-04 PROCEDURE — 77030033138 HC SUT PGA STRATFX J&J -B: Performed by: SURGERY

## 2019-06-04 PROCEDURE — 77030002916 HC SUT ETHLN J&J -A: Performed by: SURGERY

## 2019-06-04 PROCEDURE — 74011000250 HC RX REV CODE- 250: Performed by: SURGERY

## 2019-06-04 PROCEDURE — C1789 PROSTHESIS, BREAST, IMP: HCPCS | Performed by: SURGERY

## 2019-06-04 PROCEDURE — 77030032490 HC SLV COMPR SCD KNE COVD -B: Performed by: SURGERY

## 2019-06-04 PROCEDURE — 82962 GLUCOSE BLOOD TEST: CPT

## 2019-06-04 PROCEDURE — 88307 TISSUE EXAM BY PATHOLOGIST: CPT

## 2019-06-04 PROCEDURE — 88305 TISSUE EXAM BY PATHOLOGIST: CPT

## 2019-06-04 PROCEDURE — 77030031139 HC SUT VCRL2 J&J -A: Performed by: SURGERY

## 2019-06-04 PROCEDURE — 76010000172 HC OR TIME 2.5 TO 3 HR INTENSV-TIER 1: Performed by: SURGERY

## 2019-06-04 PROCEDURE — 77030016570 HC BLNKT BAIR HGGR 3M -B: Performed by: ANESTHESIOLOGY

## 2019-06-04 PROCEDURE — 77030013629 HC ELECTRD NDL STRY -B: Performed by: SURGERY

## 2019-06-04 PROCEDURE — 77030018836 HC SOL IRR NACL ICUM -A: Performed by: SURGERY

## 2019-06-04 PROCEDURE — 76060000036 HC ANESTHESIA 2.5 TO 3 HR: Performed by: SURGERY

## 2019-06-04 PROCEDURE — 77030010509 HC AIRWY LMA MSK TELE -A: Performed by: ANESTHESIOLOGY

## 2019-06-04 PROCEDURE — 77030003666 HC NDL SPINAL BD -A: Performed by: SURGERY

## 2019-06-04 PROCEDURE — 76210000021 HC REC RM PH II 0.5 TO 1 HR: Performed by: SURGERY

## 2019-06-04 PROCEDURE — 82565 ASSAY OF CREATININE: CPT

## 2019-06-04 DEVICE — SMOOTH ROUND ULTRA HIGH PROFILE SILICONE GEL-FILLED BREAST IMPLANT, 535CC  SMOOTH ROUND SILICONE
Type: IMPLANTABLE DEVICE | Site: BREAST | Status: FUNCTIONAL
Brand: MENTOR MEMORYGEL BREAST IMPLANT

## 2019-06-04 RX ORDER — SODIUM CHLORIDE 0.9 % (FLUSH) 0.9 %
5-40 SYRINGE (ML) INJECTION EVERY 8 HOURS
Status: DISCONTINUED | OUTPATIENT
Start: 2019-06-04 | End: 2019-06-04 | Stop reason: HOSPADM

## 2019-06-04 RX ORDER — HYDROMORPHONE HYDROCHLORIDE 2 MG/ML
0.5 INJECTION, SOLUTION INTRAMUSCULAR; INTRAVENOUS; SUBCUTANEOUS
Status: DISCONTINUED | OUTPATIENT
Start: 2019-06-04 | End: 2019-06-04 | Stop reason: HOSPADM

## 2019-06-04 RX ORDER — SODIUM CHLORIDE, SODIUM LACTATE, POTASSIUM CHLORIDE, CALCIUM CHLORIDE 600; 310; 30; 20 MG/100ML; MG/100ML; MG/100ML; MG/100ML
75 INJECTION, SOLUTION INTRAVENOUS CONTINUOUS
Status: DISCONTINUED | OUTPATIENT
Start: 2019-06-04 | End: 2019-06-04 | Stop reason: HOSPADM

## 2019-06-04 RX ORDER — HYDROMORPHONE HYDROCHLORIDE 2 MG/ML
INJECTION, SOLUTION INTRAMUSCULAR; INTRAVENOUS; SUBCUTANEOUS AS NEEDED
Status: DISCONTINUED | OUTPATIENT
Start: 2019-06-04 | End: 2019-06-04 | Stop reason: HOSPADM

## 2019-06-04 RX ORDER — OXYCODONE AND ACETAMINOPHEN 5; 325 MG/1; MG/1
1 TABLET ORAL AS NEEDED
Status: DISCONTINUED | OUTPATIENT
Start: 2019-06-04 | End: 2019-06-04 | Stop reason: HOSPADM

## 2019-06-04 RX ORDER — CEPHALEXIN 500 MG/1
500 CAPSULE ORAL 4 TIMES DAILY
Qty: 28 CAP | Refills: 0 | Status: SHIPPED | OUTPATIENT
Start: 2019-06-04 | End: 2020-06-05

## 2019-06-04 RX ORDER — MIDAZOLAM HYDROCHLORIDE 1 MG/ML
2 INJECTION, SOLUTION INTRAMUSCULAR; INTRAVENOUS
Status: COMPLETED | OUTPATIENT
Start: 2019-06-04 | End: 2019-06-04

## 2019-06-04 RX ORDER — LIDOCAINE HYDROCHLORIDE 20 MG/ML
INJECTION, SOLUTION EPIDURAL; INFILTRATION; INTRACAUDAL; PERINEURAL AS NEEDED
Status: DISCONTINUED | OUTPATIENT
Start: 2019-06-04 | End: 2019-06-04 | Stop reason: HOSPADM

## 2019-06-04 RX ORDER — FENTANYL CITRATE 50 UG/ML
INJECTION, SOLUTION INTRAMUSCULAR; INTRAVENOUS AS NEEDED
Status: DISCONTINUED | OUTPATIENT
Start: 2019-06-04 | End: 2019-06-04 | Stop reason: HOSPADM

## 2019-06-04 RX ORDER — PROPOFOL 10 MG/ML
INJECTION, EMULSION INTRAVENOUS AS NEEDED
Status: DISCONTINUED | OUTPATIENT
Start: 2019-06-04 | End: 2019-06-04 | Stop reason: HOSPADM

## 2019-06-04 RX ORDER — HYDROCODONE BITARTRATE AND ACETAMINOPHEN 5; 325 MG/1; MG/1
1 TABLET ORAL
Qty: 40 TAB | Refills: 0 | Status: SHIPPED | OUTPATIENT
Start: 2019-06-04 | End: 2019-06-07

## 2019-06-04 RX ORDER — BACITRACIN 50000 [IU]/1
INJECTION, POWDER, FOR SOLUTION INTRAMUSCULAR AS NEEDED
Status: DISCONTINUED | OUTPATIENT
Start: 2019-06-04 | End: 2019-06-04 | Stop reason: HOSPADM

## 2019-06-04 RX ORDER — ONDANSETRON 2 MG/ML
INJECTION INTRAMUSCULAR; INTRAVENOUS AS NEEDED
Status: DISCONTINUED | OUTPATIENT
Start: 2019-06-04 | End: 2019-06-04 | Stop reason: HOSPADM

## 2019-06-04 RX ORDER — LIDOCAINE HYDROCHLORIDE 10 MG/ML
0.1 INJECTION INFILTRATION; PERINEURAL AS NEEDED
Status: DISCONTINUED | OUTPATIENT
Start: 2019-06-04 | End: 2019-06-04 | Stop reason: HOSPADM

## 2019-06-04 RX ORDER — DEXAMETHASONE SODIUM PHOSPHATE 4 MG/ML
INJECTION, SOLUTION INTRA-ARTICULAR; INTRALESIONAL; INTRAMUSCULAR; INTRAVENOUS; SOFT TISSUE AS NEEDED
Status: DISCONTINUED | OUTPATIENT
Start: 2019-06-04 | End: 2019-06-04 | Stop reason: HOSPADM

## 2019-06-04 RX ORDER — NALOXONE HYDROCHLORIDE 0.4 MG/ML
0.2 INJECTION, SOLUTION INTRAMUSCULAR; INTRAVENOUS; SUBCUTANEOUS AS NEEDED
Status: DISCONTINUED | OUTPATIENT
Start: 2019-06-04 | End: 2019-06-04 | Stop reason: HOSPADM

## 2019-06-04 RX ORDER — SODIUM CHLORIDE 0.9 % (FLUSH) 0.9 %
5-40 SYRINGE (ML) INJECTION AS NEEDED
Status: DISCONTINUED | OUTPATIENT
Start: 2019-06-04 | End: 2019-06-04 | Stop reason: HOSPADM

## 2019-06-04 RX ORDER — CEFAZOLIN SODIUM/WATER 2 G/20 ML
2 SYRINGE (ML) INTRAVENOUS ONCE
Status: COMPLETED | OUTPATIENT
Start: 2019-06-04 | End: 2019-06-04

## 2019-06-04 RX ORDER — EPINEPHRINE 1 MG/ML
INJECTION INTRAMUSCULAR; INTRAVENOUS; SUBCUTANEOUS AS NEEDED
Status: DISCONTINUED | OUTPATIENT
Start: 2019-06-04 | End: 2019-06-04 | Stop reason: HOSPADM

## 2019-06-04 RX ADMIN — PROPOFOL 150 MG: 10 INJECTION, EMULSION INTRAVENOUS at 11:48

## 2019-06-04 RX ADMIN — FENTANYL CITRATE 50 MCG: 50 INJECTION, SOLUTION INTRAMUSCULAR; INTRAVENOUS at 12:04

## 2019-06-04 RX ADMIN — LIDOCAINE HYDROCHLORIDE 100 MG: 20 INJECTION, SOLUTION EPIDURAL; INFILTRATION; INTRACAUDAL; PERINEURAL at 11:48

## 2019-06-04 RX ADMIN — MIDAZOLAM HYDROCHLORIDE 2 MG: 2 INJECTION, SOLUTION INTRAMUSCULAR; INTRAVENOUS at 11:12

## 2019-06-04 RX ADMIN — HYDROMORPHONE HYDROCHLORIDE 0.5 MG: 2 INJECTION, SOLUTION INTRAMUSCULAR; INTRAVENOUS; SUBCUTANEOUS at 13:34

## 2019-06-04 RX ADMIN — FENTANYL CITRATE 25 MCG: 50 INJECTION, SOLUTION INTRAMUSCULAR; INTRAVENOUS at 12:50

## 2019-06-04 RX ADMIN — SODIUM CHLORIDE, SODIUM LACTATE, POTASSIUM CHLORIDE, AND CALCIUM CHLORIDE 75 ML/HR: 600; 310; 30; 20 INJECTION, SOLUTION INTRAVENOUS at 10:00

## 2019-06-04 RX ADMIN — DEXAMETHASONE SODIUM PHOSPHATE 4 MG: 4 INJECTION, SOLUTION INTRA-ARTICULAR; INTRALESIONAL; INTRAMUSCULAR; INTRAVENOUS; SOFT TISSUE at 13:15

## 2019-06-04 RX ADMIN — HYDROMORPHONE HYDROCHLORIDE 0.5 MG: 2 INJECTION, SOLUTION INTRAMUSCULAR; INTRAVENOUS; SUBCUTANEOUS at 13:15

## 2019-06-04 RX ADMIN — ONDANSETRON 4 MG: 2 INJECTION INTRAMUSCULAR; INTRAVENOUS at 13:15

## 2019-06-04 RX ADMIN — FENTANYL CITRATE 25 MCG: 50 INJECTION, SOLUTION INTRAMUSCULAR; INTRAVENOUS at 13:01

## 2019-06-04 RX ADMIN — Medication 2 G: at 11:52

## 2019-06-04 NOTE — DISCHARGE INSTRUCTIONS
Keep dressing in place and dry until return to office. Do not lift arms above shoulders. No lifting more than 5 lbs. ACTIVITY  Do not lift arms above shoulders. No lifting more than 5 lbs. As tolerated and as directed by your doctor. Bathe or shower but MUST keep dressing in place and dry until return to office. DIET  · Clear liquids until no nausea or vomiting; then light diet for the first day. · Advance to regular diet on second day, unless your doctor orders otherwise. · If nausea and vomiting continues, call your doctor. · Avoid greasy and spicy food today to reduce nausea. PAIN  · Take pain medication as directed by your doctor. · Call your doctor if pain is NOT relieved by medication. · DO NOT take aspirin of blood thinners unless directed by your doctor. · Take pain pills with food to reduce nausea  · Pain pills may cause constipation. May use stool softener    DRESSING CARE Keep dressing in place and dry until return to office. CALL YOUR DOCTOR IF   · Excessive bleeding that does not stop after holding pressure over the area  · Temperature of 101 degrees F or above  · Excessive redness, swelling or bruising, and/ or green or yellow, smelly discharge from incision    AFTER ANESTHESIA   · For the first 24 hours: DO NOT Drive, Drink alcoholic beverages, or Make important decisions. · Be aware of dizziness following anesthesia and while taking pain medication.      APPOINTMENT DATE/ TIME  Thursday, June 6, 2019 at 2:15 pm at office     [address] PHONE NUMBER [phone]      DISCHARGE SUMMARY from Nurse    PATIENT INSTRUCTIONS:    After general anesthesia or intravenous sedation, for 24 hours or while taking prescription Narcotics:  · Limit your activities  · Do not drive and operate hazardous machinery  · Do not make important personal or business decisions  · Do  not drink alcoholic beverages  · If you have not urinated within 8 hours after discharge, please contact your surgeon on call. *  Please give a list of your current medications to your Primary Care Provider. *  Please update this list whenever your medications are discontinued, doses are      changed, or new medications (including over-the-counter products) are added. *  Please carry medication information at all times in case of emergency situations. These are general instructions for a healthy lifestyle:    No smoking/ No tobacco products/ Avoid exposure to second hand smoke    Surgeon General's Warning:  Quitting smoking now greatly reduces serious risk to your health. Obesity, smoking, and sedentary lifestyle greatly increases your risk for illness    A healthy diet, regular physical exercise & weight monitoring are important for maintaining a healthy lifestyle    You may be retaining fluid if you have a history of heart failure or if you experience any of the following symptoms:  Weight gain of 3 pounds or more overnight or 5 pounds in a week, increased swelling in our hands or feet or shortness of breath while lying flat in bed. Please call your doctor as soon as you notice any of these symptoms; do not wait until your next office visit. Recognize signs and symptoms of STROKE:    F-face looks uneven    A-arms unable to move or move unevenly    S-speech slurred or non-existent    T-time-call 911 as soon as signs and symptoms begin-DO NOT go       Back to bed or wait to see if you get better-TIME IS BRAIN.

## 2019-06-04 NOTE — ANESTHESIA POSTPROCEDURE EVALUATION
Procedure(s):  RIGHT BREAST TISSUE EXPANDER  EXCHANGE FOR PERMANENT IMPLANT  LEFT BREAST MASTOPEXY. general    Anesthesia Post Evaluation      Multimodal analgesia: multimodal analgesia used between 6 hours prior to anesthesia start to PACU discharge  Patient location during evaluation: bedside  Patient participation: complete - patient participated  Level of consciousness: awake and responsive to light touch  Pain management: adequate  Airway patency: patent  Anesthetic complications: no  Cardiovascular status: acceptable, hemodynamically stable, blood pressure returned to baseline and stable  Respiratory status: acceptable, unassisted, spontaneous ventilation and nonlabored ventilation  Hydration status: acceptable  Post anesthesia nausea and vomiting:  controlled      Vitals Value Taken Time   /70 6/4/2019  3:10 PM   Temp 36.4 °C (97.6 °F) 6/4/2019  2:19 PM   Pulse 97 6/4/2019  3:12 PM   Resp 16 6/4/2019  3:10 PM   SpO2 84 % 6/4/2019  3:26 PM   Vitals shown include unvalidated device data.

## 2019-06-04 NOTE — ANESTHESIA PREPROCEDURE EVALUATION
Anesthetic History   No history of anesthetic complications            Review of Systems / Medical History  Patient summary reviewed and pertinent labs reviewed    Pulmonary  Within defined limits                 Neuro/Psych         Psychiatric history     Cardiovascular    Hypertension              Exercise tolerance: >4 METS     GI/Hepatic/Renal  Within defined limits              Endo/Other    Diabetes: well controlled, type 2  Hypothyroidism: well controlled  Obesity     Other Findings              Physical Exam    Airway  Mallampati: II  TM Distance: > 6 cm  Neck ROM: normal range of motion   Mouth opening: Normal     Cardiovascular    Rhythm: regular        Pertinent negatives: No murmur   Dental  No notable dental hx       Pulmonary  Breath sounds clear to auscultation               Abdominal  GI exam deferred       Other Findings            Anesthetic Plan    ASA: 3  Anesthesia type: general          Induction: Intravenous  Anesthetic plan and risks discussed with: Patient      LMA

## 2019-06-04 NOTE — H&P
CC: Right breast absence    HPI: 71 y/o s/p right breast tissue expander placement beneath LD flap. Previous lumpectomy and radiation. Subsequently received additional radiation to the right chest wall and axilla after flap. Ready for second stage of reconstruction. Past Medical History:   Diagnosis Date    Abnormal results of thyroid function studies     Anemia     Anxiety     BPV (benign positional vertigo)     [facility]    right---radiation and 5 years of tamoxifen--lumpectomy    Breast cancer ([facility] 2018    right    Chronic depression     Patient denies    Chronic tension headache     Diabetes mellitus type II, controlled ([facility]     Does not check, Takes PO and Saxenda, Last A1C 6.0 in 2017    Elevated glucose     Encounter for long-term (current) use of high-risk medication     Encounter for long-term (current) use of medications     Fatigue     Heart palpitations     Hematuria     Hot flashes, menopausal     Hyperlipidemia     Hypertension, benign     Insomnia     Menopausal disorder     Microcytosis     Obesity      Past Surgical History:   Procedure Laterality Date    HX BREAST BIOPSY  1999    HX BREAST LUMPECTOMY Right 1999    HX BREAST RECONSTRUCTION Right 2/21/2018    BREAST RECONSTRUCTION RIGHT performed by Erin Loo MD at [address] [place] [address] HX CATARACT REMOVAL Left 10/2017     with IOL    HX CATARACT REMOVAL Right 11/2017    with IOL    HX HYSTERECTOMY      partial    HX MASTECTOMY Right 2/21/2018    BREAST MASTECTOMY SIMPLE RIGHT performed by Genoveva Ward MD at [facility]    still w/ ovaries     A&O x3  RRR  Resp clear  Right breast absence, moderate radiation damage. R IMF 2 cm higher than left. Firm capsule. Left breast grade 2 ptosis. Left side ~ 150 ml larger than right    A/P:  Will proceed with right breast expander implant exchange, capsulotomy and IMF release to lower implant. left mastopexy, small reduction.

## 2019-06-14 NOTE — OP NOTES
OPERATIVE NOTE    Date of Procedure: 6/4/2019   Preoperative Diagnosis:   Right breast absence    Postoperative Diagnosis:   Same    Procedure(s):  Right breast expander implant exchange  Left breast mastopexy, small reduction    Prior to the procedure the patient was met in holding. Once again a discussion of the risks, benefits and alternatives was conducted including but not limited to bleeding, infection, failure of the surgery, need for further procedures, disappointing or unacceptable cosmesis, damage to adjacent structures was conducted. The patient again affirmed understanding and consent. The patient was marked in the upright and relaxed position. Patient brought to the OR, surgical timeout performed and anesthesia induced. Patient positioned and prepped and draped. Attention first turned to the right side. Previous inferior incision made sharply and carried down to expander. Expander removed and displaced to 550 mls. Circumferential and grid capsulotomy performed. Particularly damaged radiated tissue excised and passed off for permanent. Gloves changed and appropriate implant selected and placed using minimal touch technique after irrigating pocket with triple antibiotic solution. Layered closure performed with fat fascial and deep dermal 3-0 vicryl, running intracuticular quill suture. Attention turned to the left side. Previous marks reinforced. 60 ml of 0.25% Lidocaine with epi infiltrated on the left.     All of the marked incisions were scored and the pedicle de-epithelialized. Incisions then made full thickness. Central mound inferior pedicle developed sharply to the chest wall. Lateral and then medial flap thinned sharply.      Keyhole then sharply excised. Specimen passed off the field. Pedicle then trimmed taking care to maintain lateral chest wall tissue as well as NAC vascularity.  Wound irrigated and hemostasis obtained with electrocautery.      100g of tissue excised on the left.     Closure was completed bilaterally with: intermittent 3-0 vicryls at the inferior T and horizontal, cardinal points of the NAC. Running deep 3-0 vicryl at the IMF. Running 3-0 monoderm quill intracuticular completed the closure. NAC perfusion re-assessed following closure and without immediate compromise.     Formal breast dressing applied over xeroform on incisions. Surgical Staff:  Circ-1: Avery Philippe RN  Circ-Relief: Inocente Harris RN  Scrub Tech-1: Ron WHATLEY  Event Time In Time Out   Incision Start 1202    Incision Close 1408      Anesthesia: General   Estimated Blood Loss: 30 ml  Specimens:   ID Type Source Tests Collected by Time Destination   1 : Right inframammary fold Preservative   MoistVianey bowser MD 6/4/2019 1300 Pathology   2 : Left breast tissue Fresh Breast  Vianey Mendoza MD 6/4/2019 1336 Pathology      Findings: Radiation and significant scarring  Complications: None immediate  Implants:   Implant Name Type Inv.  Item Serial No.  Lot No. LRB No. Used Action   IMPL BRST GEL SR ULTR HI 535ML -- COHESIVE I - [id number]  IMPL BRST GEL SR ULTR HI 535ML -- COHESIVE I [id number] [place] [id number] Right 1 Implanted

## 2019-09-12 ENCOUNTER — HOSPITAL ENCOUNTER (OUTPATIENT)
Dept: RADIATION ONCOLOGY | Age: 69
Discharge: HOME | End: 2019-09-12
Payer: MEDICARE

## 2019-09-12 VITALS
HEART RATE: 78 BPM | WEIGHT: 161.3 LBS | RESPIRATION RATE: 16 BRPM | BODY MASS INDEX: 30.48 KG/M2 | OXYGEN SATURATION: 97 % | TEMPERATURE: 98.4 F | DIASTOLIC BLOOD PRESSURE: 68 MMHG | SYSTOLIC BLOOD PRESSURE: 134 MMHG

## 2019-09-12 PROCEDURE — 99211 OFF/OP EST MAY X REQ PHY/QHP: CPT

## 2019-09-12 NOTE — PROGRESS NOTES
6 Month Follow Up  10/08/2019 - Med Onc (NP)  History of lumpectomy, RT, Tamoxifen x 5 years  S/P Right Mastectomy  RT End: 08/02/2018  Oncology Re[facility] for PT  06/04/2019 - Path  02/20/2019 - Dexa, Mammo Sherryle Darner, CMA

## 2019-09-12 NOTE — PROGRESS NOTES
Patient: Adrián Barry MRN: [id number]  SSN: [id number]    YOB: 1950  Age: 70 y.o. Sex: female      Other Providers:  Apryl Mon MD    CHIEF COMPLAINT: Breast cancer    DIAGNOSIS: Right breast invasive ductal carcinoma, [id number] with chest wall invasion, Stage IIA. ER 99%/MI 59% positive, HER-2 negative (0). Prior right sided breast cancer with lumpectomy and radiation (1999). PREVIOUS TREATMENT:  1) 2/21/18:  Right breast mastectomy with sentinel node biopsy  2) Radiation of the right chest wall. DOSE:  5000 cGy in 25 fractions right chest wall, 1000 cGy in 5 fractions right medial chest wall boost, 1000 cGy in 5 fractions right lateral chest wall boost. Total 7000 cGy. TREATMENT DATES:  6/20/2018 - 8/2/2018. HISTORY OF PRESENT ILLNESS:  Adrián Barry is a 70 y.o. female seen initially seen by Dr. Miah Dalal at the request of Dr. Reuben Brito. She has a history of lumpectomy in 1999 for breast cancer which was treated adjuvantly with radiation and 5 years of tamoxifen at [facility]. She presented for routine screening mammogram on 11/15/17 which identified a new, incompletely characterized, posterior right breast mass. Right breast ultrasound on 11/17/18 confirmed a 2.4 cm x 1 cm x 1.8 cm hypoechoic abnormality at the level of a prior scar. MRI 11/28/17 demonstrated a 2.1 cm x 1.6 cm x 1.7 cm enhancing mass at the level of the patient's right breast lumpectomy bed concerning for locally recurrent malignancy with evidence for involvement of the directly adjacent pectoralis muscle. Biopsy showed grade 1 IDC with lobular features, ER 99%/MI 59% positive, HER-2 negative (0), low grade, well differentiated, infiltrating duct carcinoma with lobular features in association with ductal carcinoma in situ, intermediate grade (cribriform type), with no definite lymphovascular invasion identified.   Secondary to her previous right breast cancer and treatment with radiation, patient was recommended for mastectomy and sentinel node biopsy completed 2/21/18. This showed the tumor to be 2.4 cm in greatest dimension with carcinoma less than 0.1 cm from marked deep margin (invading skeletal muscle). All 4 nodes were negative for metastatic carcinoma. She was referred to oncology for evaluation and treatment of her newly diagnosed right breast cancer and saw Dr. Milton Lamb 3/5/18 who recommended Oncotype for consideration of chemotherapy and an AI. She is completing expansions with Dr. Jackie Cole and had drains in place at time of initial consultation. INTERVAL HISTORY: Ms Elena Guan returns today 13 months after completing radiation therapy of the right chest wall. Underwent final reconstruction 6/4/2019. She has done well with recovery from radiation. No breast complaints. Good energy level. Continues to take Arimidex. Effexor for hot flashes. No complaints. OBSTETRIC HISTORY:    Menarche at the age of 14.    G4,P2. Oral Contraceptive Pills:  7 years  Hormone Replacement Therapy:  \"Briefly\"  Hysterectomy in 1979.      PAST MEDICAL HISTORY:    Past Medical History:   Diagnosis Date    Abnormal results of thyroid function studies     Anemia     Anxiety     BPV (benign positional vertigo)     Breast cancer ([facility] [address] 1999    right---radiation and 5 years of tamoxifen--lumpectomy    Breast cancer ([facility] [address] 2018    right    Chronic depression     Patient denies    Chronic tension headache     Diabetes mellitus type II, controlled ([address]     Does not check, Takes PO and Saxenda, Last A1C 6.0 in 2017    Elevated glucose     Encounter for long-term (current) use of high-risk medication     Encounter for long-term (current) use of medications     Fatigue     Heart palpitations     Hematuria     Hot flashes, menopausal     Hyperlipidemia     Hypertension, benign     Insomnia     Menopausal disorder     Microcytosis     Obesity      The patient denies history of collagen vascular diseases, pacemaker insertion, prior radiation or prior chemotherapy. PAST SURGICAL HISTORY:   Past Surgical History:   Procedure Laterality Date    HX BREAST BIOPSY  1999    HX BREAST LUMPECTOMY Right 1999    HX BREAST RECONSTRUCTION Right 2/21/2018    BREAST RECONSTRUCTION RIGHT performed by Sana Esquivel MD at [address] [place] [address] HX BREAST RECONSTRUCTION Right 6/4/2019    RIGHT BREAST TISSUE EXPANDER  EXCHANGE FOR PERMANENT IMPLANT performed by Monica Jenkins MD at [place] [address] Left 6/4/2019    LEFT BREAST MASTOPEXY performed by Monica Jenkins MD at [address] [place] [address] Left 10/2017     with IOL    HX CATARACT REMOVAL Right 11/2017    with IOL    HX HYSTERECTOMY      partial    HX MASTECTOMY Right 2/21/2018    BREAST MASTECTOMY SIMPLE RIGHT performed by Carly Sepulveda MD at [facility] w/ ovaries     MEDICATIONS:     Current Outpatient Medications:     cephALEXin (KEFLEX) 500 mg capsule, Take 1 Cap by mouth four (4) times daily. , Disp: 28 Cap, Rfl: 0    anastrozole (ARIMIDEX) 1 mg tablet, Take 1 mg by mouth daily. , Disp: 90 Tab, Rfl: 3    venlafaxine-SR (EFFEXOR-XR) 150 mg capsule, Take 1 Cap by mouth daily. , Disp: 90 Cap, Rfl: 3    nebivolol (BYSTOLIC) 5 mg tablet, Take 1 Tab by mouth daily. Indications: hypertension (Patient taking differently: Take 5 mg by mouth daily. Indications: hypertension, am- take on the dos), Disp: 90 Tab, Rfl: 3    ALPRAZolam (XANAX) 0.5 mg tablet, Take 1 Tab by mouth nightly. Max Daily Amount: 0.5 mg. 1/2 to 1 for insomnia (Patient taking differently: Take 0.5 mg by mouth nightly as needed. 1/2 to 1 for insomnia  Indications: anxiety), Disp: 90 Tab, Rfl: 1    spironolactone (ALDACTONE) 25 mg tablet, Take 1 Tab by mouth daily. (Patient taking differently: Take 25 mg by mouth daily. Indications: am), Disp: 90 Tab, Rfl: 3    liraglutide (SAXENDA) 3 mg/0.5 mL (18 mg/3 mL) pen, 0.5 mL by SubCUTAneous route daily.  (Patient taking differently: 3 mg by SubCUTAneous route every evening. Indications: takes for diabetes management), Disp: 15 Pen, Rfl: 3    Insulin Needles, Disposable, (ARIANA PEN NEEDLE) 32 gauge x 5/32\" ndle, 1 Pen Needle by Does Not Apply route daily. , Disp: 90 Pen Needle, Rfl: 3    amLODIPine (NORVASC) 5 mg tablet, Take 1 Tab by mouth daily. (Patient taking differently: Take 5 mg by mouth daily. Indications: am- take on the dos), Disp: 90 Tab, Rfl: 3    butalbital-acetaminophen-caffeine (FIORICET, ESGIC) -40 mg per tablet, Take 1-2 Tabs by mouth four (4) times daily as needed for Pain. Max Daily Amount: 8 Tabs. (Patient taking differently: Take 1-2 Tabs by mouth four (4) times daily as needed for Pain. Indications: Migraine), Disp: 30 Tab, Rfl: 1    fluticasone (FLONASE) 50 mcg/actuation nasal spray, 2 Sprays by Both Nostrils route daily. (Patient taking differently: 2 Sprays by Both Nostrils route daily as needed. Indications:  Allergic Rhinitis), Disp: 1 Bottle, Rfl: 0    ALLERGIES:   Allergies   Allergen Reactions    Ambien [Zolpidem] Other (comments)     Makes her dream    Buspar [Buspirone] Vertigo    Iron Other (comments)     constipation    Macrodantin [Nitrofurantoin Macrocrystalline] Swelling     Lips and nose, itching    Milk Other (comments)     Causes stomach problems       SOCIAL HISTORY:   Social History     Socioeconomic History    Marital status:      Spouse name: Not on file    Number of children: Not on file    Years of education: Not on file    Highest education level: Not on file   Occupational History    Not on file   Social Needs    Financial resource strain: Not on file    Food insecurity:     Worry: Not on file     Inability: Not on file    Transportation needs:     Medical: Not on file     Non-medical: Not on file   Tobacco Use    Smoking status: Never Smoker    Smokeless tobacco: Never Used   Substance and Sexual Activity    Alcohol use: No    Drug use: Never    Sexual activity: Not Currently   Lifestyle    Physical activity:     Days per week: Not on file     Minutes per session: Not on file    Stress: Not on file   Relationships    Social connections:     Talks on phone: Not on file     Gets together: Not on file     Attends Christianity service: Not on file     Active member of club or organization: Not on file     Attends meetings of clubs or organizations: Not on file     Relationship status: Not on file    Intimate partner violence:     Fear of current or ex partner: Not on file     Emotionally abused: Not on file     Physically abused: Not on file     Forced sexual activity: Not on file   Other Topics Concern    Not on file   Social History Narrative    Not on file       FAMILY HISTORY:   Family History   Problem Relation Age of Onset    Uterine Cancer Mother     Hypertension Mother     Cancer Mother         Uterine    Stroke Father     Cancer Maternal Grandfather         Lung     No Known Problems Paternal Grandmother     No Known Problems Paternal Grandfather     Breast Cancer Neg Hx      PHYSICAL EXAMINATION:   ECOG Performance status 0  VITAL SIGNS: blood pressure 134/68 Pulse 78  Temperature 98.4  Weight 161.1     GENERAL: The patient is well-developed, ambulatory, alert and in no acute distress. LYMPHATIC: There is no cervical, supraclavicular or axillary lymphadenopathy bilaterally. BREASTS: Examination of the unaffected breast reveals no dominant nodules or masses. BREAST: Examination of the right breast permanent expander with healed incisions. PATHOLOGY:    2/21/18:     DIAGNOSIS   A: RIGHT MASTECTOMY: INFILTRATING DUCT CARCINOMA WITH LOBULAR AND MICROPAPILLARY FEATURES, INTERMEDIATE GRADE (MODERATELY DIFFERENTIATED) MEASURING APPROXIMATELY 2.4 X 2.1 X 1.7 CM. AREA SUSPICIOUS FOR LYMPHOVASCULAR INVASION. INFILTRATING CARCINOMA IS PRESENT LESS THAN 0.1 CM FROM MARKED DEEP MARGIN (INVADING SKELETAL MUSCLE).  OTHER MARGINS ARE GREATER THAN 1 CM FROM TUMOR. DEFINITE IN SITU COMPONENT IS NOT IDENTIFIED. CHANGES CONSISTENT WITH PRIOR BIOPSY SITE (SEE [id number]). SEE COMMENT. B: RIGHT SENTINEL LYMPH NODE #1: TWO LYMPH NODES NEGATIVE FOR METASTATIC TUMOR. C: RIGHT SENTINEL LYMPH NODE #2: LYMPH NODE NEGATIVE FOR METASTATIC CARCINOMA. D: RIGHT NONSENTINEL LYMPH NODE: ADIPOSE TISSUE WITHOUT IDENTIFIABLE LYMPH NODE. E: RIGHT SENTINEL LYMPH NODE #3: LYMPH NODE NEGATIVE FOR METASTATIC CARCINOMA. Comment   Infiltrating carcinoma in this specimen is consistent with that present on prior core biopsy, [id number], which has estrogen receptors of 99%, progesterone receptors of 59% and Her-2 negative. If clinically indicated, receptor studies can be repeated in this material.      A: ANATOMIC SITE: Right breast (presumably 6 o'clock position, 8 cm from nipple based on prior core biopsy). PROCEDURE: Mastectomy and sentinel node excision. HISTOLOGIC TYPE: Infiltrating duct carcinoma with lobular and micropapillary features. SIZE: Approximately 2.4 x 2.1 x 1.7 cm. UNIFOCAL OR MULTIFOCAL: Apparently unifocal.   PRESENCE OF SKIN, NIPPLE OR SKELETAL MUSCLE INVOLVEMENT: Skeletal muscle is involved in area of deep margin. RITCHIE MODIFICATION OF BLOOM-LOPEZ GRADE:   ARCHITECTURAL SCORE: 2/3   NUCLEAR SCORE: 3/3   MITOTIC SCORE: 2/3   TOTAL SCORE: 7/9 = Intermediate grade. IN-SITU COMPONENT: Not definitely identified. LYMPHOVASCULAR INVASION: Area suspicious for lymphovascular invasion. ARE MICROCALCIFICATIONS IDENTIFIED: Yes.   TUMOR DISTANCE TO CLOSEST MARGIN: Deep margin less than 0.1 cm. ANTERIOR (SUPERFICIAL): Greater than 1 cm. POSTERIOR (DEEP): Less than 0.1 cm. MEDIAL: Greater than 1 cm. LATERAL: Greater than 1 cm. INFERIOR: Greater than 1 cm. SUPERIOR: Greater than 1 cm.    LYMPH NODE STATUS:   TOTAL NUMBER OF LYMPH NODES CONTAINING CARCINOMA: 0   TOTAL NUMBER OF LYMPH NODES EXAMINED: 4   SIZE OF LARGEST POSITIVE NODE: Does not apply. EXTRA-CAPSULAR EXTENSION OF TUMOR: Does not apply. OTHER FINDINGS: Changes consistent with prior biopsy site. LABORATORY:   Lab Results   Component Value Date/Time    Sodium 140 04/08/2019 01:35 PM    Potassium 4.6 06/04/2019 10:08 AM    Chloride 106 04/08/2019 01:35 PM    CO2 28 04/08/2019 01:35 PM    Anion gap 6 (L) 04/08/2019 01:35 PM    Glucose 77 04/08/2019 01:35 PM    BUN 14 04/08/2019 01:35 PM    Creatinine 1.26 (H) 06/04/2019 10:08 AM    GFR est AA 57 (L) 04/08/2019 01:35 PM    GFR est non-AA 47 (L) 04/08/2019 01:35 PM    Calcium 10.7 (H) 04/08/2019 01:35 PM    Calcium 10.7 (H) 04/08/2019 01:35 PM    Magnesium 2.1 05/23/2017 08:40 AM    Albumin 3.6 04/08/2019 01:35 PM    Protein, total 7.7 04/08/2019 01:35 PM    Protein, total 7.4 04/08/2019 01:35 PM    Globulin 4.1 (H) 04/08/2019 01:35 PM    A-G Ratio 0.9 (L) 04/08/2019 01:35 PM    A-G Ratio 1.0 04/08/2019 01:35 PM    AST (SGOT) 20 04/08/2019 01:35 PM    ALT (SGPT) 30 04/08/2019 01:35 PM     Lab Results   Component Value Date/Time    WBC 4.5 04/08/2019 01:35 PM    HGB 11.4 (L) 06/04/2019 10:08 AM    HCT 36.3 04/08/2019 01:35 PM    PLATELET 155 77/82/8995 01:35 PM       RADIOLOGY:      BILATERAL SCREENING MAMMOGRAM, 11/15/2017.     CLINICAL HISTORY: Routine screening mammogram.     Comparison studies:  Screening mammograms 7/20/2016, and 7/15/2015.     FINDINGS:     Bilateral digital screening mammography, interpreted in conjunction with CAD  overread. The breasts are composed of scattered fibroglandular elements. The  axilla contain benign appearing lymph nodes. No evidence of evolving skin  thickening, or nipple retraction is seen. Scattered punctate, and round  appearing calcifications are seen which are not felt to be worrisome given their  scattered distribution and typically benign appearance. No evolving unique  clustered microcalcifications are seen to suggest a worrisome process.  A new  posterior right breast mass is partially visualized. This is felt to reside at  approximately the 9:00 position of the right breast located 10 cm from the  nipple. Otherwise, no evolving dominant mass, spiculated density, or  architectural distortion is seen.     IMPRESSION:  1. New posterior right breast mass which is incompletely characterized. This  appears to be a true finding and not superimposed shadows. Therefore, further  evaluation with focused ultrasound is recommended.     We are mailing breast density information to the patient along with the  mammogram report.     A reminder letter will be scheduled at the appropriate time pending additional  views.     BI-RADS Assessment Category 0: Incomplete: Needs additional imaging evaluation. RIGHT BREAST ULTRASOUND, 11/17/2017.     CLINICAL HISTORY: Right breast abnormality seen on screening mammogram.     FINDINGS:     An ultrasound abnormality is seen at the 6:00 position of the right breast  located 8 cm from the nipple felt to correspond to the prior mammogram  abnormality. This was previously described at 9:00 based on an MLO view. The  location is felt to be more accurately described by ultrasound. At this level,  there is a hypoechoic area measuring 2.4 cm x 1 cm x 1.8 cm in size which occurs  at the level of a prior right breast scar. Although this very well may represent  benign scar tissue, this does appear to be a new finding by mammography and has  not been demonstrated on any prior mammogram dating as far back as June 2005. Therefore, this felt that this should be more definitively confirmed by breast  MRI.     IMPRESSION:   1. 2.4 cm x 1 cm x 1.8 cm hypoechoic abnormality at the level of a prior scar  which may represent scar tissue although should be further characterized given  that this does appear to have demonstrated evolution by mammography.  This would  be best performed with a contrasted bilateral breast MRI.     A reminder letter will be scheduled at the appropriate time pending additional  views.     BI-RADS Assessment Category 0: Incomplete: Needs additional imaging evaluation. MRI BILATERAL BREASTS WITHOUT AND WITH CONTRAST, 11/28/2017.      CLINICAL HISTORY:  41-year-old with history of prior right breast cancer in 1999  treated with lumpectomy and radiation. Presents for further assessment of  ultrasound abnormality seen at level of prior scar. No current breast  complaints.     Sequences obtained: Sagittal T2, axial STIR, axial T1, and axial fat-saturated  T1-weighted images prior to and following administration of contrast.      Comparison studies: Breast MRI 11/13/2008, and bilateral mammogram 11/15/2017.      FINDINGS:   The breasts are composed of heterogeneous fibroglandular elements. No enlarged  axillary lymph nodes are seen. No enlarged internal mammary lymph nodes are  seen. Evaluation of the lungs is limited by  MRI imaging. However, no obvious  pulmonary masses are seen. No significant pleural effusions are seen. The liver  is obscured by cardiac motion artifact and only partially visualized limiting  assessment. Multiple T2 hyperintense lesions are seen. Similar lesions were  seen on the prior MRI study in these grossly do not appear to enhance following  contrast administration and therefore favored to represent benign cysts.      Following the administration of intravenous contrast, normal enhancement is seen  of the heart and vascular structures of the breasts. Asymmetric enhancement is  seen of the breasts with mild to moderate left, and no significant right  background enhancement. This is likely due to prior right breast radiation as  indicated in the patient's clinical history. At the 7:00 position of the right  breast centered 9.4 cm from the nipple there is an enhancing mass measuring 2.1  cm x 1.6 cm x 1.7 cm in size at the level of the patient's prior lumpectomy scar  which is highly concerning for locally recurrent disease.  There is abnormal  enhancement seen in the directly adjacent pectoralis muscle best appreciated on  postcontrast image 200 consistent with pectoralis muscle involvement. No  dominant enhancing masses, or worrisome patterns of enhancement are otherwise  seen. Within again, there is asymmetric nodular enhancement in the left breast  without a dominant enhancing mass or segmental area of asymmetrically prominent  nonmasslike enhancement to suggest a worrisome process. No nodule definitely  demonstrating washout is demonstrated. No evidence of skin thickening, or  nipple retraction is seen. No enhancing osseous lesion is seen in the visualized  chest.    IMPRESSION:  1.  2.1 cm x 1.6 cm x 1.7 cm enhancing mass at the level of the patient's right  breast lumpectomy bed concerning for locally recurrent malignancy. There is  evidence for involvement of the directly adjacent pectoralis muscle. Further  evaluation with ultrasound-guided biopsy is recommended given that a prior  diagnostic ultrasound has demonstrated an abnormal lesion at this level. No  additional enhancing abnormalities are seen to suggest potential multifocal or  multicentric disease.     2. No evidence for metastatic disease in the visualized chest.     BI-RADS Assessment Category 4: Suspicious Finding- Biopsy should be considered. Unilateral digital screening mammogram  2/20/2019     INDICATION:  Screening;  previous right mastectomy for breast cancer     COMPARISON:  11/15/2017 and others going back to 11/17/2009     FINDINGS: Left digital screening mammography was performed, and is interpreted  in conjunction with a computer assisted detection (CAD) system.       There are scattered fibroglandular densities.       No suspicious masses, calcifications, or architectural distortion are  identified. There is no skin thickening or nipple retraction.      IMPRESSION:  No mammographic evidence of malignancy.       Recommend annual mammogram in one year.   A reminder letter will be scheduled.     BI-RADS Assessment Category 1: Negative. BONE DENSITOMETRY: 2/20/2019     CLINICAL HISTORY: Postmenopausal screening on Arimidex after breast cancer.     FINDINGS:  Without prior study for comparison, bone mineral density in the  lumbar spine at L1-2  was measured at 1.530 g per square centimeter with a T  score of 2.9. Density elsewhere in the lumbar spine is artifactually elevated  by sclerosis associated with apparent spondylosis. Minimal bone mineral  density at the hips is measured at the left femoral neck  at 1.031  g per square  centimeter with a T score of 0. The FRAX index suggests a 10-year probability  of a major osteoporotic fracture at 3% and of a hip fracture at 0.1%.     IMPRESSION: Normal bone mineral density. IMPRESSION:  Luanne Caro is a 70 y.o. female with Stage IIA breast cancer s/p right breast mastectomy. She completed radiation of the right chest wall, 8/2/2018. Ms Deyanira Valdes is now 13 months after completing radiation therapy. She is recovering as anticipated from radiation therapy. Now with right breast expander. She reports having itching on her back around the incision site, otherwise no complaints. Denies any residual side effects related to previous radiation. Mammogram on 2/20/19 shows no evidence of malignancy. She also underwent a bone density which was normal. Continues on Arimidex. Permanent reconstruction completed 6/4/2019. Endocrine therapy under the direction of Dr. Nathaly Moreno, medical oncology. We discussed ongoing surveillance for her breast cancer which would include follow up with medical oncology. Reviewed side effects related to previous radiation. Continue lotion/SBE. Scheduled for mammogram in 2/2020 by Dr. Nathaly Moreno. All questions were answered to the best of my ability. There is no further intervention from a radiation standpoint needed at this time. To continue close followup with oncology.  We are available if needed      Cr Nam NP  09/12/19      Portions of this note were copied from prior encounters and reviewed for accuracy, currency, and represent documentation and tasks completed during this encounter. I verify and attest these portions to be unchanged from prior visits.

## 2019-10-08 ENCOUNTER — HOSPITAL ENCOUNTER (OUTPATIENT)
Dept: LAB | Age: 69
Discharge: HOME | End: 2019-10-08
Payer: MEDICARE

## 2019-10-08 DIAGNOSIS — Z17.0 MALIGNANT NEOPLASM OF RIGHT BREAST IN FEMALE, ESTROGEN RECEPTOR POSITIVE, UNSPECIFIED SITE OF BREAST (HCC): ICD-10-CM

## 2019-10-08 DIAGNOSIS — C50.911 MALIGNANT NEOPLASM OF RIGHT BREAST IN FEMALE, ESTROGEN RECEPTOR POSITIVE, UNSPECIFIED SITE OF BREAST (HCC): ICD-10-CM

## 2019-10-08 LAB
ALBUMIN SERPL-MCNC: 3.6 G/DL (ref 3.2–4.6)
ALBUMIN/GLOB SERPL: 0.9 {RATIO} (ref 1.2–3.5)
ALP SERPL-CCNC: 89 U/L (ref 50–136)
ALT SERPL-CCNC: 31 U/L (ref 12–65)
ANION GAP SERPL CALC-SCNC: 5 MMOL/L (ref 7–16)
AST SERPL-CCNC: 14 U/L (ref 15–37)
BASOPHILS # BLD: 0 K/UL (ref 0–0.2)
BASOPHILS NFR BLD: 0 % (ref 0–2)
BILIRUB SERPL-MCNC: 0.5 MG/DL (ref 0.2–1.1)
BUN SERPL-MCNC: 12 MG/DL (ref 8–23)
CALCIUM SERPL-MCNC: 10.2 MG/DL (ref 8.3–10.4)
CHLORIDE SERPL-SCNC: 110 MMOL/L (ref 98–107)
CO2 SERPL-SCNC: 28 MMOL/L (ref 21–32)
CREAT SERPL-MCNC: 1.29 MG/DL (ref 0.6–1)
DIFFERENTIAL METHOD BLD: ABNORMAL
EOSINOPHIL # BLD: 0 K/UL (ref 0–0.8)
EOSINOPHIL NFR BLD: 1 % (ref 0.5–7.8)
ERYTHROCYTE [DISTWIDTH] IN BLOOD BY AUTOMATED COUNT: 15.6 % (ref 11.9–14.6)
GLOBULIN SER CALC-MCNC: 4.1 G/DL (ref 2.3–3.5)
GLUCOSE SERPL-MCNC: 90 MG/DL (ref 65–100)
HCT VFR BLD AUTO: 36 % (ref 35.8–46.3)
HGB BLD-MCNC: 10.8 G/DL (ref 11.7–15.4)
IMM GRANULOCYTES # BLD AUTO: 0 K/UL (ref 0–0.5)
IMM GRANULOCYTES NFR BLD AUTO: 1 % (ref 0–5)
LYMPHOCYTES # BLD: 1.7 K/UL (ref 0.5–4.6)
LYMPHOCYTES NFR BLD: 31 % (ref 13–44)
MCH RBC QN AUTO: 23.9 PG (ref 26.1–32.9)
MCHC RBC AUTO-ENTMCNC: 30 G/DL (ref 31.4–35)
MCV RBC AUTO: 79.6 FL (ref 79.6–97.8)
MONOCYTES # BLD: 0.7 K/UL (ref 0.1–1.3)
MONOCYTES NFR BLD: 13 % (ref 4–12)
NEUTS SEG # BLD: 3 K/UL (ref 1.7–8.2)
NEUTS SEG NFR BLD: 55 % (ref 43–78)
NRBC # BLD: 0 K/UL (ref 0–0.2)
PLATELET # BLD AUTO: 196 K/UL (ref 150–450)
PMV BLD AUTO: 8.5 FL (ref 9.4–12.3)
POTASSIUM SERPL-SCNC: 4.1 MMOL/L (ref 3.5–5.1)
PROT SERPL-MCNC: 7.7 G/DL (ref 6.3–8.2)
RBC # BLD AUTO: 4.52 M/UL (ref 4.05–5.25)
SODIUM SERPL-SCNC: 143 MMOL/L (ref 136–145)
WBC # BLD AUTO: 5.5 K/UL (ref 4.3–11.1)

## 2019-10-08 PROCEDURE — 80053 COMPREHEN METABOLIC PANEL: CPT

## 2019-10-08 PROCEDURE — 85025 COMPLETE CBC W/AUTO DIFF WBC: CPT

## 2019-10-08 PROCEDURE — 36415 COLL VENOUS BLD VENIPUNCTURE: CPT

## 2019-11-14 NOTE — ADT AUTH CERT NOTES
Mastectomy, Complete - Care Day 2 (2/22/2018) by Bello Patel RN        Review Entered Review Status       2/22/2018 Completed       Details              Care Day: 2 Care Date: 2/22/2018 Level of Care: Inpatient Floor       Guideline Day 1        Level Of Care       (X) Early AM OR to recovery to discharge [I]       2/22/2018 2:46 PM EST by Briseida Patiño         Floor                     Clinical Status       ( ) * Successful, uncomplicated mastectomy       ( ) * Pain and nausea absent or managed postoperatively       ( ) * Surgical wound without evidence of infection or dehiscence       ( ) * Discharge plans and education understood              Activity       ( ) * Ambulatory postoperatively              Routes       ( ) * Oral hydration, medications, and diet as tolerated postoperatively              Medications       (X) Analgesics       2/22/2018 2:46 PM EST by Briseida Patiño         dilaudid                     2/22/2018 2:46 PM EST by Briseida Patiño       Subject: Additional Clinical Information       No acute events overnight. Has been up and walking to restroom independently. Voided x 3. Tolerating diet. Pain still requiring some IV pain meds.   A&O [id number] clearIncisions c/d/i. Drains appropriate. Stripped. No clot. Flap pink and well perfused. 2 s cap refill. No seroma or hematoma.  V.S.98.1, 85, 147/75, 16, Continue drains, anticipate d/c with drains x3 drain teaching for caregiver. Limit lifting 5 lbs right arm. Do not lift arm above shoulder. Start DVT prophy - lvxWean IV pain meds.  Medications,Keflex 500mg PO 4 times daily,Lovenox 40mg SC q 24 hours,Dilaudid 0.5mg IV x1,Percocet 1 tab PO x1,Effexor-XR 37.5mg PO daily-                                   * Milestone                  Mastectomy, Complete - Care Day 1 (2/21/2018) by Bello Patel RN        Review Entered Review Status       2/22/2018 Completed       Details              Care Day: 1 Care Date: 2/21/2018 Level of Care: Inpatient Floor       Guideline Day 1        Level Of Care       (X) Early AM OR to recovery to discharge [I]       2/22/2018 7:59 AM EST by Srikanth Burden         Floor                     Clinical Status       ( ) * Successful, uncomplicated mastectomy       ( ) * Pain and nausea absent or managed postoperatively       ( ) * Surgical wound without evidence of infection or dehiscence       ( ) * Discharge plans and education understood              Activity       ( ) * Ambulatory postoperatively              Routes       (X) IV fluids, medications for procedure       2/22/2018 7:59 AM EST by Srikanth Burden         LR IV fluids              ( ) * Oral hydration, medications, and diet as tolerated postoperatively              Medications       (X) Analgesics       2/22/2018 7:59 AM EST by Srikanth Burden         Dilaudid IV                     2/22/2018 7:59 AM EST by Srikanth Burden       Subject: Additional Clinical Information       Date of Procedure: 2/21/2018 Preoperative Diagnosis: Malignant neoplasm of right female breast, unspecified estrogen receptor status, unspecified site of breast ([facility] [address] [C50.911]Postoperative Diagnosis: female breast, unspecified estrogen receptor status, unspecified site of breast ([facility] [address] [C50.911]   Procedure(s):RIGHT SENTINAL LYMPH NODE BIOPSY with mappingBREAST MASTECTOMY SIMPLE RIGHTBREAST RECONSTRUCTION RIGHT-V. S.98.4, 77, 144/77, 18, 100% RA,Medications,Percocet 1 tab PO x1,Ancef 2g IV x1,Dilaudid 0.5mg IV x1,LR at 75cc/hr IV continuous,Versed 2mg IV x1,                                   * Milestone                  Mastectomy, Complete - Clinical Indications for Procedure by Faheem Arnold RN        Review Entered Review Status       2/22/2018 Completed       Details              Clinical Indications for Procedure       Most Recent : Srikanth Burden Most Recent Date: 2/22/2018 7:53 AM EST                                              H&P Notes          H&P by Lorraine Kelly MD at 02/21/18 1158 documented on Admission (Current) from 2/21/2018 in [facility] 2 SURGICAL        Author: Douglas Hercules MD Author Type: Physician Filed: 02/21/18 115       Note Status: Signed Cosign: Cosign Not Required Date of Service: 02/21/18 115       : Douglas Hercules MD (Physician)                   [address]  [phone]       Patient: Neymar Gonzalez  CGY: 11[birth year]74/[birth year]61      FAUSTO Davaloslist is U 6 y.o. female who presents to the office today to discuss surgical options for right breast mass.  She underwent routine mammography and was found to have right breast mass.  Patient states that she had right breast cancer in 1999 in which she underwent right lumpectomy with radiation and 5 years of tamoxifen. Venia Agnes has healed well from this has a scar low almost in the inframammary fold.  Patient states she has no pain to the breast and denies any nipple discharge. No redness or tenderness noted. States she does self breast exams and can not feel any changes.  Denies weight loss or fatigue.  Denies smoking or use of alcohol. Core Bx completed on 12/5/2017 infiltrating duct carcinoma. ER/MN positive and Her-2/mai negative.  Denies family history of cancer.                  Past Medical History:   Diagnosis Date    Abnormal results of thyroid function studies       Anemia       Anxiety       BPV (benign positional vertigo)       Breast cancer (HCC) 1999    Chronic depression       Chronic tension headache       Diabetes mellitus type II, controlled ([facility] [address]       Elevated glucose       Encounter for long-term (current) use of high-risk medication       Encounter for long-term (current) use of medications       Fatigue       Heart palpitations       Hematuria       Hot flashes, menopausal       Hyperlipidemia       Hypertension, benign       Hyperthyroidism       Insomnia       Menopausal disorder       Microcytosis       Obesity                      Current Outpatient Prescriptions   Medication Sig Dispense Refill    nebivolol (BYSTOLIC) 5 mg tablet Take 1 Tab by mouth daily. Indications: hypertension 90 Tab 3    ALPRAZolam (XANAX) 0.5 mg tablet Take 1 Tab by mouth nightly. Max Daily Amount: 0.5 mg. 1/2 to 1 for insomnia 90 Tab 1    venlafaxine (EFFEXOR) 75 mg tablet Take 1 Tab by mouth two (2) times a day. 180 Tab 3    spironolactone (ALDACTONE) 25 mg tablet Take 1 Tab by mouth daily. 90 Tab 3    liraglutide (SAXENDA) 3 mg/0.5 mL (18 mg/3 mL) pen 0.5 mL by SubCUTAneous route daily. 15 Pen 3    Insulin Needles, Disposable, (ARIANA PEN NEEDLE) 32 gauge x 5/32\" ndle 1 Pen Needle by Does Not Apply route daily. 90 Pen Needle 3    amLODIPine (NORVASC) 5 mg tablet Take 1 Tab by mouth daily. 90 Tab 3    butalbital-acetaminophen-caffeine (FIORICET, ESGIC) -40 mg per tablet Take 1-2 Tabs by mouth four (4) times daily as needed for Pain. Max Daily Amount: 8 Tabs. 30 Tab 1    fluticasone (FLONASE) 50 mcg/actuation nasal spray 2 Sprays by Both Nostrils route daily. 1 Bottle 0    aspirin 81 mg chewable tablet Take 81 mg by mouth daily.          Calcium-Vitamin D3-Vitamin K (VIACTIV) 306-924-79 mg-unit-mcg chew Take 1 Tab by mouth two (2) times a day.          methylPREDNISolone (MEDROL DOSEPACK) 4 mg tablet Take as directed on the pack.  1 Dose Pack 0                 Allergies   Allergen Reactions    Ambien [Zolpidem] Other (comments)         Makes her dream    Buspar [Buspirone] Vertigo    Iron Other (comments)         constipation    Macrodantin [Nitrofurantoin Macrocrystalline] Unknown (comments)    Milk Other (comments)         Causes stomach problems                 Past Surgical History:   Procedure Laterality Date    HX BREAST BIOPSY    1999    HX BREAST LUMPECTOMY    1999    HX CATARACT REMOVAL Left 10/2017      dr Schuyler Pacheco eye    [facility] Alex Dotson; planned for 11/16/2017   Taz Mejia HX PARTIAL HYSTERECTOMY            still w/ ovaries                 Family History   Problem Relation Age of Onset    Uterine Cancer Mother       Hypertension Mother [facility]    Stroke Father       Heart Disease Maternal Grandmother                  Social History   Substance Use Topics    Smoking status: Never Smoker    Smokeless tobacco: Never Used    Alcohol use No          Review of Systems   A comprehensive review of systems was negative except for that written in the HPI.       Physical Exam          Visit Vitals    /72    Pulse 83    Ht 5' 1\" (1.549 m)    Wt 164 lb (74.4 kg)    SpO2 98%    BMI 30.99 kg/m2          General:                    Alert, oriented, cooperative, awake patient in no acute distress   Skin:                                    Warm, moist with good texture   Eyes:                                   Sclera are clear, extraocular muscles intact  HENT:                                 Normocephalic; oral mucosa moist, nares patent; neck is supple; trachea midline  Respiratory:               Lungs clear to auscultation bilaterally, breathing is non-labored   Chest:                                  Symmetric throughout one respiratory excursion; no supraclavicular lymphadenopathy, no axillary lymphadenopathy noted, right breast soft, non-tender, a little firmness noted near old scar, no masses palpated  CV:                                      Regular rate and rhythm, no appreciable murmurs, rubs, gallops  Abdomen:                  Soft, protuberant but non-distended; bowel sounds are normoactive   Extremities:               No cyanosis, clubbing or edema  Neurological:             No focal signs          Labs: No results found for: CMP, APTT, PTP, INR       Mammo reviewed      MRI reviewed      Pathology reviewed      Assessment/Plan:  Max Pisano is a 78 y.o. female who has signs and symptoms consistent with right breast cancer.  Spoke with patient regarding need for mastectomy since she preciously had radiation.  Explained the need for sentinel lymph node bx and offered her the option of breast reconstruction with a plastic surgeon simultaneously. Enrique Savers verbalized understanding and wants to proceed with mastectomy, sentinel lymph node bx.      1. Refer to plastic surgeon for reconstruction options  2. Schedule Mastectomy with sentinel lymph node bx.with simultaneous reconstruction with plastics. [id number] [address] [place], NP       I have seen and examined the patient with the Nurse Practitioner and agree with the above assessment and plan.       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 Plan right mastectomy with SLN biopsy with immediately reconstruction. Will take some chest wall to ensure margin.      No new c/o, ready to proceed as above.   Oscar Carbajal MD                                            H&P by Daphney Adrian MD at 02/21/18 1132 documented on Admission (Current) from 2/21/2018 in [facility] 2 SURGICAL        Author: Daphney Adrian MD Author Type: Physician Filed: 02/21/18 7240       Note Status: Signed Cosign: Cosign Not Required Date of Service: 02/21/18 1255       : Daphney Adrian MD (Physician)                   CC: Right breast cancer  HPI: 76 y/o s/p previous lumpectomy and radation with a new right breast cancer. Plans for mastectomy, SLN, LDMCF reconstruction. No issues. Off anticoagulation. Has had her lymphoscintigraphy performed.           Past Medical History:   Diagnosis Date    Abnormal results of thyroid function studies      Anemia      Anxiety      BPV (benign positional vertigo)      Breast cancer (HCC) 1999     right---radiation and 5 years of tamoxifen--lumpectomy    Breast cancer ([facility] [address] 2018     right    Chronic depression      Chronic tension headache      Diabetes mellitus type II, controlled ([facility] [address]       saxenda- bs: does not check blood sugars.      Elevated glucose      Encounter for long-term (current) use of high-risk medication      Encounter for long-term (current) use of medications      Fatigue      Heart palpitations      Hematuria      Hot flashes, menopausal      Hyperlipidemia      Hypertension, benign      Hyperthyroidism      Insomnia      Menopausal disorder      Microcytosis      Obesity              Past Surgical History:   Procedure Laterality Date    HX BREAST BIOPSY   1999    HX BREAST LUMPECTOMY   1999    HX CATARACT REMOVAL Left 10/2017      with IOL    HX CATARACT REMOVAL Right 11/2017     with IOL    HX PARTIAL HYSTERECTOMY         still w/ ovaries      A&O x3  RRR  Resp clear  Marked in upright position. Right inframammory scar. Minimal radiation damage.      A/P:  Will porceed with immediate flap reconstruction. no

## 2020-02-25 ENCOUNTER — HOSPITAL ENCOUNTER (OUTPATIENT)
Dept: MAMMOGRAPHY | Age: 70
Discharge: HOME | End: 2020-02-25
Attending: INTERNAL MEDICINE
Payer: MEDICARE

## 2020-02-25 DIAGNOSIS — Z12.31 SCREENING MAMMOGRAM, ENCOUNTER FOR: ICD-10-CM

## 2020-02-25 PROCEDURE — 77067 SCR MAMMO BI INCL CAD: CPT

## 2020-06-05 ENCOUNTER — HOSPITAL ENCOUNTER (OUTPATIENT)
Dept: LAB | Age: 70
Discharge: HOME | End: 2020-06-05
Payer: MEDICARE

## 2020-06-05 DIAGNOSIS — Z17.0 MALIGNANT NEOPLASM OF RIGHT BREAST IN FEMALE, ESTROGEN RECEPTOR POSITIVE, UNSPECIFIED SITE OF BREAST (HCC): ICD-10-CM

## 2020-06-05 DIAGNOSIS — C50.911 MALIGNANT NEOPLASM OF RIGHT BREAST IN FEMALE, ESTROGEN RECEPTOR POSITIVE, UNSPECIFIED SITE OF BREAST (HCC): ICD-10-CM

## 2020-06-05 LAB
ALBUMIN SERPL-MCNC: 3.4 G/DL (ref 3.2–4.6)
ALBUMIN/GLOB SERPL: 0.9 {RATIO} (ref 1.2–3.5)
ALP SERPL-CCNC: 77 U/L (ref 50–136)
ALT SERPL-CCNC: 42 U/L (ref 12–65)
ANION GAP SERPL CALC-SCNC: 3 MMOL/L (ref 7–16)
AST SERPL-CCNC: 21 U/L (ref 15–37)
BASOPHILS # BLD: 0 K/UL (ref 0–0.2)
BASOPHILS NFR BLD: 1 % (ref 0–2)
BILIRUB SERPL-MCNC: 0.5 MG/DL (ref 0.2–1.1)
BUN SERPL-MCNC: 13 MG/DL (ref 8–23)
CALCIUM SERPL-MCNC: 9.9 MG/DL (ref 8.3–10.4)
CHLORIDE SERPL-SCNC: 111 MMOL/L (ref 98–107)
CO2 SERPL-SCNC: 28 MMOL/L (ref 21–32)
CREAT SERPL-MCNC: 1.3 MG/DL (ref 0.6–1)
DIFFERENTIAL METHOD BLD: ABNORMAL
EOSINOPHIL # BLD: 0 K/UL (ref 0–0.8)
EOSINOPHIL NFR BLD: 1 % (ref 0.5–7.8)
ERYTHROCYTE [DISTWIDTH] IN BLOOD BY AUTOMATED COUNT: 15.4 % (ref 11.9–14.6)
GLOBULIN SER CALC-MCNC: 3.8 G/DL (ref 2.3–3.5)
GLUCOSE SERPL-MCNC: 115 MG/DL (ref 65–100)
HCT VFR BLD AUTO: 36.3 % (ref 35.8–46.3)
HGB BLD-MCNC: 11.1 G/DL (ref 11.7–15.4)
IMM GRANULOCYTES # BLD AUTO: 0.1 K/UL (ref 0–0.5)
IMM GRANULOCYTES NFR BLD AUTO: 1 % (ref 0–5)
LYMPHOCYTES # BLD: 1.8 K/UL (ref 0.5–4.6)
LYMPHOCYTES NFR BLD: 34 % (ref 13–44)
MCH RBC QN AUTO: 24.2 PG (ref 26.1–32.9)
MCHC RBC AUTO-ENTMCNC: 30.6 G/DL (ref 31.4–35)
MCV RBC AUTO: 79.1 FL (ref 79.6–97.8)
MONOCYTES # BLD: 0.6 K/UL (ref 0.1–1.3)
MONOCYTES NFR BLD: 12 % (ref 4–12)
NEUTS SEG # BLD: 2.8 K/UL (ref 1.7–8.2)
NEUTS SEG NFR BLD: 51 % (ref 43–78)
NRBC # BLD: 0 K/UL (ref 0–0.2)
PLATELET # BLD AUTO: 205 K/UL (ref 150–450)
PMV BLD AUTO: 9.1 FL (ref 9.4–12.3)
POTASSIUM SERPL-SCNC: 4.1 MMOL/L (ref 3.5–5.1)
PROT SERPL-MCNC: 7.2 G/DL (ref 6.3–8.2)
RBC # BLD AUTO: 4.59 M/UL (ref 4.05–5.25)
SODIUM SERPL-SCNC: 142 MMOL/L (ref 136–145)
WBC # BLD AUTO: 5.4 K/UL (ref 4.3–11.1)

## 2020-06-05 PROCEDURE — 85025 COMPLETE CBC W/AUTO DIFF WBC: CPT

## 2020-06-05 PROCEDURE — 80053 COMPREHEN METABOLIC PANEL: CPT

## 2020-06-05 PROCEDURE — 36415 COLL VENOUS BLD VENIPUNCTURE: CPT

## 2020-12-11 ENCOUNTER — HOSPITAL ENCOUNTER (OUTPATIENT)
Dept: LAB | Age: 70
Discharge: HOME | End: 2020-12-11
Payer: MEDICARE

## 2020-12-11 DIAGNOSIS — Z17.0 MALIGNANT NEOPLASM OF RIGHT BREAST IN FEMALE, ESTROGEN RECEPTOR POSITIVE, UNSPECIFIED SITE OF BREAST (HCC): ICD-10-CM

## 2020-12-11 DIAGNOSIS — C50.911 MALIGNANT NEOPLASM OF RIGHT BREAST IN FEMALE, ESTROGEN RECEPTOR POSITIVE, UNSPECIFIED SITE OF BREAST (HCC): ICD-10-CM

## 2020-12-11 LAB
ALBUMIN SERPL-MCNC: 3.5 G/DL (ref 3.2–4.6)
ALBUMIN/GLOB SERPL: 0.9 {RATIO} (ref 1.2–3.5)
ALP SERPL-CCNC: 79 U/L (ref 50–136)
ALT SERPL-CCNC: 31 U/L (ref 12–65)
ANION GAP SERPL CALC-SCNC: 5 MMOL/L (ref 7–16)
AST SERPL-CCNC: 19 U/L (ref 15–37)
BASOPHILS # BLD: 0.1 K/UL (ref 0–0.2)
BASOPHILS NFR BLD: 1 % (ref 0–2)
BILIRUB SERPL-MCNC: 0.4 MG/DL (ref 0.2–1.1)
BUN SERPL-MCNC: 9 MG/DL (ref 8–23)
CALCIUM SERPL-MCNC: 10.3 MG/DL (ref 8.3–10.4)
CHLORIDE SERPL-SCNC: 108 MMOL/L (ref 98–107)
CO2 SERPL-SCNC: 27 MMOL/L (ref 21–32)
CREAT SERPL-MCNC: 1.3 MG/DL (ref 0.6–1)
DIFFERENTIAL METHOD BLD: ABNORMAL
EOSINOPHIL # BLD: 0.1 K/UL (ref 0–0.8)
EOSINOPHIL NFR BLD: 1 % (ref 0.5–7.8)
ERYTHROCYTE [DISTWIDTH] IN BLOOD BY AUTOMATED COUNT: 15.2 % (ref 11.9–14.6)
GLOBULIN SER CALC-MCNC: 3.7 G/DL (ref 2.3–3.5)
GLUCOSE SERPL-MCNC: 115 MG/DL (ref 65–100)
HCT VFR BLD AUTO: 35.4 % (ref 35.8–46.3)
HGB BLD-MCNC: 10.8 G/DL (ref 11.7–15.4)
IMM GRANULOCYTES # BLD AUTO: 0 K/UL (ref 0–0.5)
IMM GRANULOCYTES NFR BLD AUTO: 1 % (ref 0–5)
LYMPHOCYTES # BLD: 1.9 K/UL (ref 0.5–4.6)
LYMPHOCYTES NFR BLD: 35 % (ref 13–44)
MCH RBC QN AUTO: 24.2 PG (ref 26.1–32.9)
MCHC RBC AUTO-ENTMCNC: 30.5 G/DL (ref 31.4–35)
MCV RBC AUTO: 79.4 FL (ref 79.6–97.8)
MONOCYTES # BLD: 0.6 K/UL (ref 0.1–1.3)
MONOCYTES NFR BLD: 12 % (ref 4–12)
NEUTS SEG # BLD: 2.7 K/UL (ref 1.7–8.2)
NEUTS SEG NFR BLD: 51 % (ref 43–78)
NRBC # BLD: 0 K/UL (ref 0–0.2)
PLATELET # BLD AUTO: 224 K/UL (ref 150–450)
PMV BLD AUTO: 8.9 FL (ref 9.4–12.3)
POTASSIUM SERPL-SCNC: 4.4 MMOL/L (ref 3.5–5.1)
PROT SERPL-MCNC: 7.2 G/DL (ref 6.3–8.2)
RBC # BLD AUTO: 4.46 M/UL (ref 4.05–5.25)
SODIUM SERPL-SCNC: 140 MMOL/L (ref 136–145)
WBC # BLD AUTO: 5.4 K/UL (ref 4.3–11.1)

## 2020-12-11 PROCEDURE — 36415 COLL VENOUS BLD VENIPUNCTURE: CPT

## 2020-12-11 PROCEDURE — 80053 COMPREHEN METABOLIC PANEL: CPT

## 2020-12-11 PROCEDURE — 85025 COMPLETE CBC W/AUTO DIFF WBC: CPT

## 2021-03-01 ENCOUNTER — HOSPITAL ENCOUNTER (OUTPATIENT)
Dept: MAMMOGRAPHY | Age: 71
Discharge: HOME | End: 2021-03-01
Attending: INTERNAL MEDICINE
Payer: MEDICARE

## 2021-03-01 DIAGNOSIS — Z17.0 MALIGNANT NEOPLASM OF RIGHT BREAST IN FEMALE, ESTROGEN RECEPTOR POSITIVE, UNSPECIFIED SITE OF BREAST (HCC): ICD-10-CM

## 2021-03-01 DIAGNOSIS — C50.911 MALIGNANT NEOPLASM OF RIGHT BREAST IN FEMALE, ESTROGEN RECEPTOR POSITIVE, UNSPECIFIED SITE OF BREAST (HCC): ICD-10-CM

## 2021-03-01 PROCEDURE — 77067 SCR MAMMO BI INCL CAD: CPT

## 2021-03-01 PROCEDURE — 77080 DXA BONE DENSITY AXIAL: CPT

## 2021-06-14 ENCOUNTER — HOSPITAL ENCOUNTER (OUTPATIENT)
Dept: LAB | Age: 71
Discharge: HOME | End: 2021-06-14
Payer: MEDICARE

## 2021-06-14 DIAGNOSIS — Z17.0 MALIGNANT NEOPLASM OF RIGHT BREAST IN FEMALE, ESTROGEN RECEPTOR POSITIVE, UNSPECIFIED SITE OF BREAST (HCC): ICD-10-CM

## 2021-06-14 DIAGNOSIS — E83.52 HYPERCALCEMIA: ICD-10-CM

## 2021-06-14 DIAGNOSIS — C50.911 MALIGNANT NEOPLASM OF RIGHT BREAST IN FEMALE, ESTROGEN RECEPTOR POSITIVE, UNSPECIFIED SITE OF BREAST (HCC): ICD-10-CM

## 2021-06-14 LAB
ALBUMIN SERPL-MCNC: 3.8 G/DL (ref 3.2–4.6)
ALBUMIN/GLOB SERPL: 1 {RATIO} (ref 1.2–3.5)
ALP SERPL-CCNC: 81 U/L (ref 50–136)
ALT SERPL-CCNC: 33 U/L (ref 12–65)
ANION GAP SERPL CALC-SCNC: 3 MMOL/L (ref 7–16)
AST SERPL-CCNC: 20 U/L (ref 15–37)
BASOPHILS # BLD: 0 K/UL (ref 0–0.2)
BASOPHILS NFR BLD: 1 % (ref 0–2)
BILIRUB SERPL-MCNC: 0.4 MG/DL (ref 0.2–1.1)
BUN SERPL-MCNC: 15 MG/DL (ref 8–23)
CALCIUM SERPL-MCNC: 11.1 MG/DL (ref 8.3–10.4)
CALCIUM SERPL-MCNC: 11.1 MG/DL (ref 8.3–10.4)
CHLORIDE SERPL-SCNC: 108 MMOL/L (ref 98–107)
CO2 SERPL-SCNC: 30 MMOL/L (ref 21–32)
CREAT SERPL-MCNC: 1.5 MG/DL (ref 0.6–1)
DIFFERENTIAL METHOD BLD: ABNORMAL
EOSINOPHIL # BLD: 0.1 K/UL (ref 0–0.8)
EOSINOPHIL NFR BLD: 1 % (ref 0.5–7.8)
ERYTHROCYTE [DISTWIDTH] IN BLOOD BY AUTOMATED COUNT: 16.1 % (ref 11.9–14.6)
GLOBULIN SER CALC-MCNC: 4 G/DL (ref 2.3–3.5)
GLUCOSE SERPL-MCNC: 79 MG/DL (ref 65–100)
HCT VFR BLD AUTO: 36.9 % (ref 35.8–46.3)
HGB BLD-MCNC: 11.4 G/DL (ref 11.7–15.4)
IMM GRANULOCYTES # BLD AUTO: 0 K/UL (ref 0–0.5)
IMM GRANULOCYTES NFR BLD AUTO: 1 % (ref 0–5)
LYMPHOCYTES # BLD: 1.7 K/UL (ref 0.5–4.6)
LYMPHOCYTES NFR BLD: 29 % (ref 13–44)
MCH RBC QN AUTO: 24.3 PG (ref 26.1–32.9)
MCHC RBC AUTO-ENTMCNC: 30.9 G/DL (ref 31.4–35)
MCV RBC AUTO: 78.5 FL (ref 79.6–97.8)
MONOCYTES # BLD: 0.8 K/UL (ref 0.1–1.3)
MONOCYTES NFR BLD: 13 % (ref 4–12)
NEUTS SEG # BLD: 3.3 K/UL (ref 1.7–8.2)
NEUTS SEG NFR BLD: 56 % (ref 43–78)
NRBC # BLD: 0 K/UL (ref 0–0.2)
PLATELET # BLD AUTO: 221 K/UL (ref 150–450)
PMV BLD AUTO: 8.7 FL (ref 9.4–12.3)
POTASSIUM SERPL-SCNC: 3.9 MMOL/L (ref 3.5–5.1)
PROT SERPL-MCNC: 7.8 G/DL (ref 6.3–8.2)
PTH-INTACT SERPL-MCNC: 49.3 PG/ML (ref 18.5–88)
RBC # BLD AUTO: 4.7 M/UL (ref 4.05–5.2)
SODIUM SERPL-SCNC: 141 MMOL/L (ref 136–145)
WBC # BLD AUTO: 6 K/UL (ref 4.3–11.1)

## 2021-06-14 PROCEDURE — 36415 COLL VENOUS BLD VENIPUNCTURE: CPT

## 2021-06-14 PROCEDURE — 85025 COMPLETE CBC W/AUTO DIFF WBC: CPT

## 2021-06-14 PROCEDURE — 83970 ASSAY OF PARATHORMONE: CPT

## 2021-06-14 PROCEDURE — 80053 COMPREHEN METABOLIC PANEL: CPT

## 2021-12-14 ENCOUNTER — HOSPITAL ENCOUNTER (OUTPATIENT)
Dept: LAB | Age: 71
Discharge: HOME | End: 2021-12-14
Payer: MEDICARE

## 2021-12-14 DIAGNOSIS — C50.911 MALIGNANT NEOPLASM OF RIGHT BREAST IN FEMALE, ESTROGEN RECEPTOR POSITIVE, UNSPECIFIED SITE OF BREAST (HCC): ICD-10-CM

## 2021-12-14 DIAGNOSIS — Z17.0 MALIGNANT NEOPLASM OF RIGHT BREAST IN FEMALE, ESTROGEN RECEPTOR POSITIVE, UNSPECIFIED SITE OF BREAST (HCC): ICD-10-CM

## 2021-12-14 LAB
ALBUMIN SERPL-MCNC: 3.4 G/DL (ref 3.2–4.6)
ALBUMIN/GLOB SERPL: 0.9 {RATIO} (ref 1.2–3.5)
ALP SERPL-CCNC: 84 U/L (ref 50–136)
ALT SERPL-CCNC: 36 U/L (ref 12–65)
ANION GAP SERPL CALC-SCNC: 3 MMOL/L (ref 7–16)
AST SERPL-CCNC: 19 U/L (ref 15–37)
BASOPHILS # BLD: 0 K/UL (ref 0–0.2)
BASOPHILS NFR BLD: 1 % (ref 0–2)
BILIRUB SERPL-MCNC: 0.4 MG/DL (ref 0.2–1.1)
BUN SERPL-MCNC: 11 MG/DL (ref 8–23)
CALCIUM SERPL-MCNC: 10 MG/DL (ref 8.3–10.4)
CHLORIDE SERPL-SCNC: 109 MMOL/L (ref 98–107)
CO2 SERPL-SCNC: 29 MMOL/L (ref 21–32)
CREAT SERPL-MCNC: 1.3 MG/DL (ref 0.6–1)
DIFFERENTIAL METHOD BLD: ABNORMAL
EOSINOPHIL # BLD: 0.1 K/UL (ref 0–0.8)
EOSINOPHIL NFR BLD: 1 % (ref 0.5–7.8)
ERYTHROCYTE [DISTWIDTH] IN BLOOD BY AUTOMATED COUNT: 15.2 % (ref 11.9–14.6)
GLOBULIN SER CALC-MCNC: 3.9 G/DL (ref 2.3–3.5)
GLUCOSE SERPL-MCNC: 123 MG/DL (ref 65–100)
HCT VFR BLD AUTO: 36.7 % (ref 35.8–46.3)
HGB BLD-MCNC: 11.1 G/DL (ref 11.7–15.4)
IMM GRANULOCYTES # BLD AUTO: 0 K/UL (ref 0–0.5)
IMM GRANULOCYTES NFR BLD AUTO: 1 % (ref 0–5)
LYMPHOCYTES # BLD: 2 K/UL (ref 0.5–4.6)
LYMPHOCYTES NFR BLD: 36 % (ref 13–44)
MCH RBC QN AUTO: 23.9 PG (ref 26.1–32.9)
MCHC RBC AUTO-ENTMCNC: 30.2 G/DL (ref 31.4–35)
MCV RBC AUTO: 78.9 FL (ref 79.6–97.8)
MONOCYTES # BLD: 0.7 K/UL (ref 0.1–1.3)
MONOCYTES NFR BLD: 13 % (ref 4–12)
NEUTS SEG # BLD: 2.6 K/UL (ref 1.7–8.2)
NEUTS SEG NFR BLD: 48 % (ref 43–78)
NRBC # BLD: 0 K/UL (ref 0–0.2)
PLATELET # BLD AUTO: 225 K/UL (ref 150–450)
PMV BLD AUTO: 8.6 FL (ref 9.4–12.3)
POTASSIUM SERPL-SCNC: 4 MMOL/L (ref 3.5–5.1)
PROT SERPL-MCNC: 7.3 G/DL (ref 6.3–8.2)
RBC # BLD AUTO: 4.65 M/UL (ref 4.05–5.2)
SODIUM SERPL-SCNC: 141 MMOL/L (ref 136–145)
WBC # BLD AUTO: 5.5 K/UL (ref 4.3–11.1)

## 2021-12-14 PROCEDURE — 85025 COMPLETE CBC W/AUTO DIFF WBC: CPT

## 2021-12-14 PROCEDURE — 36415 COLL VENOUS BLD VENIPUNCTURE: CPT

## 2021-12-14 PROCEDURE — 80053 COMPREHEN METABOLIC PANEL: CPT

## 2022-01-04 LAB
AVERAGE GLUCOSE: NORMAL
HBA1C MFR BLD: 6.6 %

## 2022-03-07 ENCOUNTER — HOSPITAL ENCOUNTER (OUTPATIENT)
Dept: MAMMOGRAPHY | Age: 72
Discharge: HOME | End: 2022-03-07
Attending: INTERNAL MEDICINE
Payer: MEDICARE

## 2022-03-07 DIAGNOSIS — Z12.31 BREAST CANCER SCREENING BY MAMMOGRAM: ICD-10-CM

## 2022-03-07 PROCEDURE — 77067 SCR MAMMO BI INCL CAD: CPT

## 2022-03-19 PROBLEM — E11.21 TYPE 2 DIABETES WITH NEPHROPATHY (HCC): Status: ACTIVE | Noted: 2018-03-05

## 2022-03-19 PROBLEM — C50.919 BREAST CANCER IN FEMALE (HCC): Status: ACTIVE | Noted: 2018-02-21

## 2022-04-22 DIAGNOSIS — C50.919 MALIGNANT NEOPLASM OF FEMALE BREAST, UNSPECIFIED ESTROGEN RECEPTOR STATUS, UNSPECIFIED LATERALITY, UNSPECIFIED SITE OF BREAST (HCC): Primary | ICD-10-CM

## 2022-06-14 NOTE — PATIENT INSTRUCTIONS
Patient Instructions from Today's Visit    Reason for Visit:  Follow up - Breast    Diagnosis Information:  [email or web address]      Plan:  - labs that have resulted reviewed and no concerns noted.  Continue the Arimidex    Follow Up:  - 6 months    Recent Lab Results:  Hospital Outpatient Visit on 06/15/2022   Component Date Value Ref Range Status    WBC 06/15/2022 6.1  4.3 - 11.1 K/uL Final    RBC 06/15/2022 4.97  4.05 - 5.2 M/uL Final    Hemoglobin 06/15/2022 11.8  11.7 - 15.4 g/dL Final    Hematocrit 06/15/2022 39.8  35.8 - 46.3 % Final    MCV 06/15/2022 80.1  79.6 - 97.8 FL Final    MCH 06/15/2022 23.7* 26.1 - 32.9 PG Final    MCHC 06/15/2022 29.6* 31.4 - 35.0 g/dL Final    RDW 06/15/2022 15.0* 11.9 - 14.6 % Final    Platelets 14/28/7920 252  150 - 450 K/uL Final    MPV 06/15/2022 9.0* 9.4 - 12.3 FL Final    nRBC 06/15/2022 0.00  0.0 - 0.2 K/uL Final    **Note: Absolute NRBC parameter is now reported with Hemogram**    Seg Neutrophils 06/15/2022 48  43 - 78 % Final    Lymphocytes 06/15/2022 37  13 - 44 % Final    Monocytes 06/15/2022 12  4.0 - 12.0 % Final    Eosinophils % 06/15/2022 1  0.5 - 7.8 % Final    Basophils 06/15/2022 1  0.0 - 2.0 % Final    Immature Granulocytes 06/15/2022 1  0.0 - 5.0 % Final    Segs Absolute 06/15/2022 3.0  1.7 - 8.2 K/UL Final    Absolute Lymph # 06/15/2022 2.2  0.5 - 4.6 K/UL Final    Absolute Mono # 06/15/2022 0.8  0.1 - 1.3 K/UL Final    Absolute Eos # 06/15/2022 0.1  0.0 - 0.8 K/UL Final    Basophils Absolute 06/15/2022 0.1  0.0 - 0.2 K/UL Final    Absolute Immature Granulocyte 06/15/2022 0.0  0.0 - 0.5 K/UL Final    Differential Type 06/15/2022 AUTOMATED    Final         Treatment Summary has been discussed and given to patient: n/a        -------------------------------------------------------------------------------------------------------------------  Please call our office at [phone] if you have any  of the following symptoms:   · Fever of 100.5 or greater  · Chills  · Shortness of breath  · Swelling or pain in one leg    After office hours an answering service is available and will contact a provider for emergencies or if you are experiencing any of the above symptoms.  Patient has not express an interest in My Chart. My Chart log in information explained on the after visit summary printout at the [facility] [address] desk.     Jeb Perez RN

## 2022-06-15 ENCOUNTER — HOSPITAL ENCOUNTER (OUTPATIENT)
Dept: LAB | Age: 72
Discharge: HOME | End: 2022-06-18
Payer: MEDICARE

## 2022-06-15 ENCOUNTER — OFFICE VISIT (OUTPATIENT)
Dept: ONCOLOGY | Age: 72
End: 2022-06-15
Payer: MEDICARE

## 2022-06-15 VITALS
HEART RATE: 64 BPM | DIASTOLIC BLOOD PRESSURE: 72 MMHG | TEMPERATURE: 98.1 F | BODY MASS INDEX: 30.67 KG/M2 | OXYGEN SATURATION: 98 % | WEIGHT: 166.7 LBS | HEIGHT: 62 IN | SYSTOLIC BLOOD PRESSURE: 125 MMHG | RESPIRATION RATE: 22 BRPM

## 2022-06-15 DIAGNOSIS — C50.919 MALIGNANT NEOPLASM OF FEMALE BREAST, UNSPECIFIED ESTROGEN RECEPTOR STATUS, UNSPECIFIED LATERALITY, UNSPECIFIED SITE OF BREAST (HCC): ICD-10-CM

## 2022-06-15 DIAGNOSIS — C50.919 MALIGNANT NEOPLASM OF FEMALE BREAST, UNSPECIFIED ESTROGEN RECEPTOR STATUS, UNSPECIFIED LATERALITY, UNSPECIFIED SITE OF BREAST (HCC): Primary | ICD-10-CM

## 2022-06-15 LAB
ALBUMIN SERPL-MCNC: 3.7 G/DL (ref 3.2–4.6)
ALBUMIN/GLOB SERPL: 0.9 {RATIO} (ref 1.2–3.5)
ALP SERPL-CCNC: 86 U/L (ref 50–136)
ALT SERPL-CCNC: 35 U/L (ref 12–65)
ANION GAP SERPL CALC-SCNC: 6 MMOL/L (ref 7–16)
AST SERPL-CCNC: 20 U/L (ref 15–37)
BASOPHILS # BLD: 0.1 K/UL (ref 0–0.2)
BASOPHILS NFR BLD: 1 % (ref 0–2)
BILIRUB SERPL-MCNC: 0.3 MG/DL (ref 0.2–1.1)
BUN SERPL-MCNC: 15 MG/DL (ref 8–23)
CALCIUM SERPL-MCNC: 10.4 MG/DL (ref 8.3–10.4)
CHLORIDE SERPL-SCNC: 108 MMOL/L (ref 98–107)
CO2 SERPL-SCNC: 27 MMOL/L (ref 21–32)
CREAT SERPL-MCNC: 1.5 MG/DL (ref 0.6–1)
DIFFERENTIAL METHOD BLD: ABNORMAL
EOSINOPHIL # BLD: 0.1 K/UL (ref 0–0.8)
EOSINOPHIL NFR BLD: 1 % (ref 0.5–7.8)
ERYTHROCYTE [DISTWIDTH] IN BLOOD BY AUTOMATED COUNT: 15 % (ref 11.9–14.6)
GLOBULIN SER CALC-MCNC: 4 G/DL (ref 2.3–3.5)
GLUCOSE SERPL-MCNC: 93 MG/DL (ref 65–100)
HCT VFR BLD AUTO: 39.8 % (ref 35.8–46.3)
HGB BLD-MCNC: 11.8 G/DL (ref 11.7–15.4)
IMM GRANULOCYTES # BLD AUTO: 0 K/UL (ref 0–0.5)
IMM GRANULOCYTES NFR BLD AUTO: 1 % (ref 0–5)
LYMPHOCYTES # BLD: 2.2 K/UL (ref 0.5–4.6)
LYMPHOCYTES NFR BLD: 37 % (ref 13–44)
MCH RBC QN AUTO: 23.7 PG (ref 26.1–32.9)
MCHC RBC AUTO-ENTMCNC: 29.6 G/DL (ref 31.4–35)
MCV RBC AUTO: 80.1 FL (ref 79.6–97.8)
MONOCYTES # BLD: 0.8 K/UL (ref 0.1–1.3)
MONOCYTES NFR BLD: 12 % (ref 4–12)
NEUTS SEG # BLD: 3 K/UL (ref 1.7–8.2)
NEUTS SEG NFR BLD: 48 % (ref 43–78)
NRBC # BLD: 0 K/UL (ref 0–0.2)
PLATELET # BLD AUTO: 252 K/UL (ref 150–450)
PMV BLD AUTO: 9 FL (ref 9.4–12.3)
POTASSIUM SERPL-SCNC: 4.2 MMOL/L (ref 3.5–5.1)
PROT SERPL-MCNC: 7.7 G/DL (ref 6.3–8.2)
RBC # BLD AUTO: 4.97 M/UL (ref 4.05–5.2)
SODIUM SERPL-SCNC: 141 MMOL/L (ref 136–145)
WBC # BLD AUTO: 6.1 K/UL (ref 4.3–11.1)

## 2022-06-15 PROCEDURE — 80053 COMPREHEN METABOLIC PANEL: CPT

## 2022-06-15 PROCEDURE — G8417 CALC BMI ABV UP PARAM F/U: HCPCS | Performed by: INTERNAL MEDICINE

## 2022-06-15 PROCEDURE — 1090F PRES/ABSN URINE INCON ASSESS: CPT | Performed by: INTERNAL MEDICINE

## 2022-06-15 PROCEDURE — G8428 CUR MEDS NOT DOCUMENT: HCPCS | Performed by: INTERNAL MEDICINE

## 2022-06-15 PROCEDURE — 3017F COLORECTAL CA SCREEN DOC REV: CPT | Performed by: INTERNAL MEDICINE

## 2022-06-15 PROCEDURE — 1036F TOBACCO NON-USER: CPT | Performed by: INTERNAL MEDICINE

## 2022-06-15 PROCEDURE — 36415 COLL VENOUS BLD VENIPUNCTURE: CPT

## 2022-06-15 PROCEDURE — 99214 OFFICE O/P EST MOD 30 MIN: CPT | Performed by: INTERNAL MEDICINE

## 2022-06-15 PROCEDURE — 85025 COMPLETE CBC W/AUTO DIFF WBC: CPT

## 2022-06-15 PROCEDURE — G8399 PT W/DXA RESULTS DOCUMENT: HCPCS | Performed by: INTERNAL MEDICINE

## 2022-06-15 PROCEDURE — 1123F ACP DISCUSS/DSCN MKR DOCD: CPT | Performed by: INTERNAL MEDICINE

## 2022-06-15 RX ORDER — EZETIMIBE 10 MG/1
TABLET ORAL
COMMUNITY
Start: 2022-05-18

## 2022-06-15 ASSESSMENT — PATIENT HEALTH QUESTIONNAIRE - PHQ9
1. LITTLE INTEREST OR PLEASURE IN DOING THINGS: 0
SUM OF ALL RESPONSES TO PHQ QUESTIONS 1-9: 0
SUM OF ALL RESPONSES TO PHQ9 QUESTIONS 1 & 2: 0
2. FEELING DOWN, DEPRESSED OR HOPELESS: 0
SUM OF ALL RESPONSES TO PHQ QUESTIONS 1-9: 0

## 2022-06-15 NOTE — PROGRESS NOTES
[facility] Hematology and Oncology: Office Visit Established Patient    Chief Complaint:    Chief Complaint   Patient presents with    Follow-up         History of Present Illness:  Ms. Elvira Eastman is a 66 y.o. female who returns today for management of breast cancer. She has a history of lumpectomy in 1999 for breast cancer, she completed radiation and 5 years of tamoxifen. She presented for routine screening mammogram on 11/15/17 which identified a new, incompletely  characterized, posterior right breast mass. Additional imaging with right breast ultrasound on 11/17/18 confirmed a 2.4 cm x 1 cm x 1.8 cm hypoechoic abnormality at the level of a prior scar. MRI, performed on 11/28/17, demonstrated a 2.1 cm x 1.6  cm x 1.7 cm enhancing mass at the level of the patient's right breast lumpectomy bed concerning for locally recurrent malignancy with evidence for involvement of the directly adjacent pectoralis muscle. Biopsy showed grade 1 IDC with lobular features,  ER 99%/NV 59% positive, HER-2 negative (0), low grade, well differentiated, infiltrating duct carcinoma with lobular features in association with ductal carcinoma in situ, intermediate grade (cribriform type), with no definite lymphovascular invasion  identified. Secondary to her previous right breast cancer and treatment with radiation, patient was recommended for mastectomy and sentinel node biopsy. This showed the tumor to be 2.4 cm in greatest dimension with carcinoma less than 0.1 cm from marked  deep margin (invading skeletal muscle). All 4 biopsied nodes were negative for metastatic carcinoma. She was referred to oncology for evaluation and treatment of her newly diagnosed right breast cancer. We recommended Oncotype Dx analysis for risk stratification,  and radiation oncology referral for consideration of PMRT. Oncotype Dx reviewed and low risk at 25. Therefore, I would recommend endocrine therapy alone, with Arimidex.    Radiation was completed in August 2018. She returns today for routine follow-up. She is tolerating Arimidex fairly well. She continues to have similar symptoms, these are mainly hot flashes (stable on Effexor 150 mg daily) and joint pains. She has some discomfort in the right lateral chest wall related to her reconstruction. Otherwise doing well, there are no GI or bowel complaints, appetite is good and weight is stable. Energy level is good, ECOG 0. No contralateral breast complaints. No new concerns or complaints. Review of Systems:  Constitutional: Negative. HENT: Negative. Eyes: Negative. Respiratory: Negative. Cardiovascular: Negative. Gastrointestinal: Negative. Genitourinary: Negative. Musculoskeletal: Positive for joint pains. Skin: Negative. Neurological: Negative. Endo/Heme/Allergies: Positive for hot flashes. Psychiatric/Behavioral: Negative. All other systems reviewed and are negative.        Allergies   Allergen Reactions    Buspirone Other (See Comments)    Iron Other (See Comments)     constipation    Lac Bovis Other (See Comments)     Causes stomach problems    Nitrofurantoin Swelling     Lips and nose, itching    Zolpidem Other (See Comments)     Makes her dream     Past Medical History:   Diagnosis Date    Abnormal results of thyroid function studies     Anemia     Anxiety     BPV (benign positional vertigo)     Breast cancer ([facility] [address] 2018    right    Breast cancer ([facility] [address] 1999    right---radiation and 5 years of tamoxifen--lumpectomy    Chronic depression     Patient denies    Chronic tension headache     Diabetes mellitus type II, controlled ([facility] [address]     Does not check, Takes PO and Saxenda, Last A1C 6.0 in 2017    Elevated glucose     Encounter for long-term (current) use of high-risk medication     Encounter for long-term (current) use of medications     Fatigue     Heart palpitations     Hematuria     Hot flashes, menopausal     Hyperlipidemia     Hypertension, benign     Insomnia     Menopausal disorder     Microcytosis     Obesity      Past Surgical History:   Procedure Laterality Date    BREAST BIOPSY  1999    BREAST LUMPECTOMY Right 1999    BREAST RECONSTRUCTION Right 2/21/2018    BREAST RECONSTRUCTION RIGHT performed by Kath Monzon MD at [facility] Right 6/4/2019    RIGHT BREAST TISSUE EXPANDER  EXCHANGE FOR PERMANENT IMPLANT performed by Jaylyn Cooper MD at [id number] [facility] [employer] Left 6/4/2019    LEFT BREAST MASTOPEXY [facility] by [facility] at [facility] Right 11/2017    with IOL    CATARACT REMOVAL Left 10/2017     with IOL    HYSTERECTOMY (CERVIX STATUS UNKNOWN)      partial    MASTECTOMY Right 2/21/2018    BREAST MASTECTOMY SIMPLE RIGHT performed by Gordy Fuentes MD at [address] (CERVIX NOT REMOVED)  1979    still w/ ovaries    US BREAST NEEDLE BIOPSY RIGHT Right 12/5/2017    US BREAST NEEDLE BIOPSY RIGHT 12/5/2017 [facility]     Family History   Problem Relation Age of Onset    Cancer Mother         Uterine    Cancer Maternal Grandfather         Lung     No Known Problems Paternal Grandmother     Hypertension Mother     Uterine Cancer Mother     Stroke Father     Breast Cancer Neg Hx     No Known Problems Paternal Grandfather      Social History     Socioeconomic History    Marital status:       Spouse name: Not on file    Number of children: Not on file    Years of education: Not on file    Highest education level: Not on file   Occupational History    Not on file   Tobacco Use    Smoking status: Never Smoker    Smokeless tobacco: Never Used   Substance and Sexual Activity    Alcohol use: No    Drug use: Never    Sexual activity: Not on file   Other Topics Concern    Not on file   Social History Narrative    Not on file     Social Determinants of Health     Financial Resource Strain:     Difficulty of Paying Living Expenses: Not on file   Food Insecurity:     Worried About Running Out of Food in the Last Year: Not on file    Ran Out of Food in the Last Year: Not on file   Transportation Needs:     Lack of Transportation (Medical): Not on file    Lack of Transportation (Non-Medical): Not on file   Physical Activity:     Days of Exercise per Week: Not on file    Minutes of Exercise per Session: Not on file   Stress:     Feeling of Stress : Not on file   Social Connections:     Frequency of Communication with Friends and Family: Not on file    Frequency of Social Gatherings with Friends and Family: Not on file    Attends Christianity Services: Not on file    Active Member of [address] [place] [address] [employer] or Organizations: Not on file    Attends Club or Organization Meetings: Not on file    Marital Status: Not on file   Intimate Partner Violence:     Fear of Current or Ex-Partner: Not on file    Emotionally Abused: Not on file    Physically Abused: Not on file    Sexually Abused: Not on file   Housing Stability:     Unable to Pay for Housing in the Last Year: Not on file    Number of [place] in the Last Year: Not on file    Unstable Housing in the Last Year: Not on file     Current Outpatient Medications   Medication Sig Dispense Refill    ezetimibe (ZETIA) 10 MG tablet TAKE 1 TABLET BY MOUTH EVERY DAY      ALPRAZolam (XANAX) 0.5 MG tablet Take 0.5 mg by mouth.  amLODIPine (NORVASC) 5 MG tablet Take 5 mg by mouth daily      anastrozole (ARIMIDEX) 1 MG tablet Take 1 mg by mouth daily      nebivolol (BYSTOLIC) 5 MG tablet Take 5 mg by mouth daily      spironolactone (ALDACTONE) 25 MG tablet Take 25 mg by mouth daily      venlafaxine (EFFEXOR XR) 150 MG extended release capsule Take 150 mg by mouth daily       No current facility-administered medications for this visit.        OBJECTIVE:  /72 (Site: Left Upper Arm, Position: Sitting, Cuff Size: Large Adult)   Pulse 64   Temp 98.1 °F (36.7 °C) (Oral) Resp 22   Ht 5' 1.5\" (1.562 m)   Wt 166 lb 11.2 oz (75.6 kg)   SpO2 98%   BMI 30.99 kg/m²     Physical Exam:  Constitutional: Well developed, well nourished female in no acute distress, sitting comfortably in the exam room chair. HEENT: Normocephalic and atraumatic. Sclerae anicteric. Neck supple without JVD. No thyromegaly present. Lymph node   No palpable submandibular, cervical, supraclavicular lymph nodes. Skin Warm and dry. No bruising and no rash noted. No erythema. No pallor. Respiratory Lungs are clear to auscultation bilaterally without wheezes, rales or rhonchi, normal air exchange without accessory muscle use. CVS Normal rate, regular rhythm and normal S1 and S2. No murmurs, gallops, or rubs. Abdomen Soft, nontender and nondistended, normoactive bowel sounds. No palpable mass. No hepatosplenomegaly. Neuro Grossly nonfocal with no obvious sensory or motor deficits. MSK Normal range of motion in general.  No edema and no tenderness. Psych Appropriate mood and affect.       Labs:  Recent Results (from the past 96 hour(s))   CBC with Auto Differential    Collection Time: 06/15/22  2:34 PM   Result Value Ref Range    WBC 6.1 4.3 - 11.1 K/uL    RBC 4.97 4.05 - 5.2 M/uL    Hemoglobin 11.8 11.7 - 15.4 g/dL    Hematocrit 39.8 35.8 - 46.3 %    MCV 80.1 79.6 - 97.8 FL    MCH 23.7 (L) 26.1 - 32.9 PG    MCHC 29.6 (L) 31.4 - 35.0 g/dL    RDW 15.0 (H) 11.9 - 14.6 %    Platelets 241 449 - 897 K/uL    MPV 9.0 (L) 9.4 - 12.3 FL    nRBC 0.00 0.0 - 0.2 K/uL    Seg Neutrophils 48 43 - 78 %    Lymphocytes 37 13 - 44 %    Monocytes 12 4.0 - 12.0 %    Eosinophils % 1 0.5 - 7.8 %    Basophils 1 0.0 - 2.0 %    Immature Granulocytes 1 0.0 - 5.0 %    Segs Absolute 3.0 1.7 - 8.2 K/UL    Absolute Lymph # 2.2 0.5 - 4.6 K/UL    Absolute Mono # 0.8 0.1 - 1.3 K/UL    Absolute Eos # 0.1 0.0 - 0.8 K/UL    Basophils Absolute 0.1 0.0 - 0.2 K/UL    Absolute Immature Granulocyte 0.0 0.0 - 0.5 K/UL    Differential Type AUTOMATED     Comprehensive Metabolic Panel    Collection Time: 06/15/22  2:34 PM   Result Value Ref Range    Sodium 141 136 - 145 mmol/L    Potassium 4.2 3.5 - 5.1 mmol/L    Chloride 108 (H) 98 - 107 mmol/L    CO2 27 21 - 32 mmol/L    Anion Gap 6 (L) 7 - 16 mmol/L    Glucose 93 65 - 100 mg/dL    BUN 15 8 - 23 MG/DL    CREATININE 1.50 (H) 0.6 - 1.0 MG/DL    GFR  44 (L) >60 ml/min/1.73m2    GFR Non- 36 (L) >60 ml/min/1.73m2    Calcium 10.4 8.3 - 10.4 MG/DL    Total Bilirubin 0.3 0.2 - 1.1 MG/DL    ALT 35 12 - 65 U/L    AST 20 15 - 37 U/L    Alk Phosphatase 86 50 - 136 U/L    Total Protein 7.7 6.3 - 8.2 g/dL    Albumin 3.7 3.2 - 4.6 g/dL    Globulin 4.0 (H) 2.3 - 3.5 g/dL    Albumin/Globulin Ratio 0.9 (L) 1.2 - 3.5         Imaging:  No results found. ASSESSMENT:   Diagnosis Orders   1. Malignant neoplasm of female breast, unspecified estrogen receptor status, unspecified laterality, unspecified site of breast ([facility] [address]  CBC with Auto Differential    Comprehensive Metabolic Panel         PLAN:  Lab studies were personally reviewed. Breast cancer: 2.4 cm on surgical pathology, invasion into pectoralis but margins negative (<0.1 cm). S/p mastectomy and SLNBx, 0/4 nodes involved with tumor. ER 99%, RI 59%, HER2 negative. [id number]  (stage IIA). Although she had noted skeletal muscle involvement, this is still not considered T4 based on the AJCC staging guidelines because \"only pectoralis invasion or adherence\" is specifically excluded from T4 classification. Therefore, I would  consider this a T2 lesion. Because of the ER/RI positivity, the negative lobo assessment, and the T2 tumor, she would be an appropriate candidate for Oncotype Dx for risk stratification. This  was low risk at 25. Therefore, I would recommend endocrine therapy alone, with Arimidex. We also recommended she meet with radiation oncology due to the close deep margin on pathology.   This was completed in

## 2022-10-25 NOTE — DISCHARGE SUMMARY
Patient: Carlos Adkins MRN: [id number]  SSN: [id number]    YOB: 1950  Age: 75 y.o. Sex: female      Carlos Adkins is a 75 y.o. female who was seen by radiation oncology at the request of Dr. Brandy Robles. She has a history of lumpectomy in 1999 for breast cancer which was treated adjuvantly with radiation and 5 years of tamoxifen at [facility].  She presented for routine screening mammogram on 11/15/17 which identified a new, incompletely characterized, posterior right breast mass.  Right breast ultrasound on 11/17/18 confirmed a 2.4 cm x 1 cm x 1.8 cm hypoechoic abnormality at the level of a prior scar.  MRI 11/28/17 demonstrated a 2.1 cm x 1.6 cm x 1.7 cm enhancing mass at the level of the patient's right breast lumpectomy bed concerning for locally recurrent malignancy with evidence for involvement of the directly adjacent pectoralis muscle.  Biopsy showed grade 1 IDC with lobular features, ER 99%/ME 59% positive, HER-2 negative (0), low grade, well differentiated, infiltrating duct carcinoma with lobular features in association with ductal carcinoma in situ, intermediate grade (cribriform type), with no definite lymphovascular invasion identified.  Secondary to her previous right breast cancer and treatment with radiation, patient was recommended for mastectomy and sentinel node biopsy completed 2/21/18.  This showed the tumor to be 2.4 cm in greatest dimension with carcinoma less than 0.1 cm from marked deep margin (invading skeletal muscle). All 4 nodes were negative for metastatic carcinoma.  She was referred to oncology for evaluation and treatment of her newly diagnosed right breast cancer and saw Dr. Brandy Robles 3/5/18 who recommended Oncotype for consideration of chemotherapy and an AI.   She was completing expansions with Dr. Rosanna Lowe and had drains in place at time of initial consultation with Dr. Diana Cantrell. The recommendation was for adjuvant radiation therapy following mastectomy.      Please see the details of her treatment below as well as my plans for future care and surveillance. Please do not hesitate to call with questions or concerns at any time. DIAGNOSIS:  Right breast invasive ductal carcinoma, [id number] with chest wall invasion, Stage IIA. ER 99%/IA 59% positive, HER-2 negative (0). Prior right sided breast cancer with lumpectomy and radiation (1999). PREVIOUS TREATMENT:    1) 2/21/18:  Right breast mastectomy with sentinel node biopsy    TREATMENT DATES:  6/20/2018 - 8/2/2018    ANATOMIC SITE:  Right chest wall    DOSE:  5000 cGy in 25 fractions right chest wall, 1000 cGy in 5 fractions right medial chest wall boost,                 1000 cGy in 5 fractions right lateral chest wall boost. Total 7000 cGy. BEAM ARRANGEMENT:  3D Conformal - 10MV right chest wall, 3D Conformal - 9E right medial chest wall boost.                                            3D Conformal - 9E right lateral chest wall boost.     CHEMOTHERAPY:  None    TREATMENT COURSE: Ms Peyman Fermin did well without complaints week 1. On week 2 she complained of some occasional chest discomfort and \"stinging. \" No change week 3-5. She had some axillary irritation final week    PLAN:  The patient will be seen in follow up in 4 weeks to assess acute and sub acute side effects.       CARMELO Reed MD  08/09/18 Known

## 2022-12-21 ENCOUNTER — HOSPITAL ENCOUNTER (OUTPATIENT)
Dept: LAB | Age: 72
Discharge: HOME | End: 2022-12-24
Payer: MEDICARE

## 2022-12-21 ENCOUNTER — OFFICE VISIT (OUTPATIENT)
Dept: ONCOLOGY | Age: 72
End: 2022-12-21
Payer: MEDICARE

## 2022-12-21 VITALS
DIASTOLIC BLOOD PRESSURE: 66 MMHG | BODY MASS INDEX: 30.77 KG/M2 | HEART RATE: 85 BPM | SYSTOLIC BLOOD PRESSURE: 126 MMHG | RESPIRATION RATE: 16 BRPM | WEIGHT: 167.2 LBS | HEIGHT: 62 IN | OXYGEN SATURATION: 98 % | TEMPERATURE: 98.1 F

## 2022-12-21 DIAGNOSIS — T45.1X5A HOT FLASHES RELATED TO AROMATASE INHIBITOR THERAPY: ICD-10-CM

## 2022-12-21 DIAGNOSIS — M25.50 AROMATASE INHIBITOR-ASSOCIATED ARTHRALGIA: ICD-10-CM

## 2022-12-21 DIAGNOSIS — C50.919 MALIGNANT NEOPLASM OF FEMALE BREAST, UNSPECIFIED ESTROGEN RECEPTOR STATUS, UNSPECIFIED LATERALITY, UNSPECIFIED SITE OF BREAST (HCC): Primary | ICD-10-CM

## 2022-12-21 DIAGNOSIS — Z79.811 LONG TERM (CURRENT) USE OF AROMATASE INHIBITORS: ICD-10-CM

## 2022-12-21 DIAGNOSIS — C50.919 MALIGNANT NEOPLASM OF FEMALE BREAST, UNSPECIFIED ESTROGEN RECEPTOR STATUS, UNSPECIFIED LATERALITY, UNSPECIFIED SITE OF BREAST (HCC): ICD-10-CM

## 2022-12-21 DIAGNOSIS — R23.2 HOT FLASHES RELATED TO AROMATASE INHIBITOR THERAPY: ICD-10-CM

## 2022-12-21 DIAGNOSIS — T45.1X5A AROMATASE INHIBITOR-ASSOCIATED ARTHRALGIA: ICD-10-CM

## 2022-12-21 DIAGNOSIS — Z12.31 ENCOUNTER FOR SCREENING MAMMOGRAM FOR MALIGNANT NEOPLASM OF BREAST: ICD-10-CM

## 2022-12-21 LAB
ALBUMIN SERPL-MCNC: 3.7 G/DL (ref 3.2–4.6)
ALBUMIN/GLOB SERPL: 0.9 {RATIO} (ref 0.4–1.6)
ALP SERPL-CCNC: 95 U/L (ref 50–136)
ALT SERPL-CCNC: 32 U/L (ref 12–65)
ANION GAP SERPL CALC-SCNC: 2 MMOL/L (ref 2–11)
AST SERPL-CCNC: 16 U/L (ref 15–37)
BASOPHILS # BLD: 0 K/UL (ref 0–0.2)
BASOPHILS NFR BLD: 1 % (ref 0–2)
BILIRUB SERPL-MCNC: 0.3 MG/DL (ref 0.2–1.1)
BUN SERPL-MCNC: 14 MG/DL (ref 8–23)
CALCIUM SERPL-MCNC: 10.4 MG/DL (ref 8.3–10.4)
CHLORIDE SERPL-SCNC: 108 MMOL/L (ref 101–110)
CO2 SERPL-SCNC: 30 MMOL/L (ref 21–32)
CREAT SERPL-MCNC: 1.6 MG/DL (ref 0.6–1)
DIFFERENTIAL METHOD BLD: ABNORMAL
EOSINOPHIL # BLD: 0.1 K/UL (ref 0–0.8)
EOSINOPHIL NFR BLD: 1 % (ref 0.5–7.8)
ERYTHROCYTE [DISTWIDTH] IN BLOOD BY AUTOMATED COUNT: 15.5 % (ref 11.9–14.6)
GLOBULIN SER CALC-MCNC: 4 G/DL (ref 2.8–4.5)
GLUCOSE SERPL-MCNC: 116 MG/DL (ref 65–100)
HCT VFR BLD AUTO: 42 % (ref 35.8–46.3)
HGB BLD-MCNC: 12.7 G/DL (ref 11.7–15.4)
IMM GRANULOCYTES # BLD AUTO: 0.1 K/UL (ref 0–0.5)
IMM GRANULOCYTES NFR BLD AUTO: 1 % (ref 0–5)
LYMPHOCYTES # BLD: 2.5 K/UL (ref 0.5–4.6)
LYMPHOCYTES NFR BLD: 37 % (ref 13–44)
MCH RBC QN AUTO: 24.5 PG (ref 26.1–32.9)
MCHC RBC AUTO-ENTMCNC: 30.2 G/DL (ref 31.4–35)
MCV RBC AUTO: 80.9 FL (ref 82–102)
MONOCYTES # BLD: 0.8 K/UL (ref 0.1–1.3)
MONOCYTES NFR BLD: 12 % (ref 4–12)
NEUTS SEG # BLD: 3.4 K/UL (ref 1.7–8.2)
NEUTS SEG NFR BLD: 48 % (ref 43–78)
NRBC # BLD: 0 K/UL (ref 0–0.2)
PLATELET # BLD AUTO: 223 K/UL (ref 150–450)
PMV BLD AUTO: 8.8 FL (ref 9.4–12.3)
POTASSIUM SERPL-SCNC: 3.8 MMOL/L (ref 3.5–5.1)
PROT SERPL-MCNC: 7.7 G/DL (ref 6.3–8.2)
RBC # BLD AUTO: 5.19 M/UL (ref 4.05–5.2)
SODIUM SERPL-SCNC: 140 MMOL/L (ref 133–143)
WBC # BLD AUTO: 7 K/UL (ref 4.3–11.1)

## 2022-12-21 PROCEDURE — 3078F DIAST BP <80 MM HG: CPT | Performed by: NURSE PRACTITIONER

## 2022-12-21 PROCEDURE — G8427 DOCREV CUR MEDS BY ELIG CLIN: HCPCS | Performed by: NURSE PRACTITIONER

## 2022-12-21 PROCEDURE — 3017F COLORECTAL CA SCREEN DOC REV: CPT | Performed by: NURSE PRACTITIONER

## 2022-12-21 PROCEDURE — 85025 COMPLETE CBC W/AUTO DIFF WBC: CPT

## 2022-12-21 PROCEDURE — 1036F TOBACCO NON-USER: CPT | Performed by: NURSE PRACTITIONER

## 2022-12-21 PROCEDURE — 80053 COMPREHEN METABOLIC PANEL: CPT

## 2022-12-21 PROCEDURE — 99214 OFFICE O/P EST MOD 30 MIN: CPT | Performed by: NURSE PRACTITIONER

## 2022-12-21 PROCEDURE — 1123F ACP DISCUSS/DSCN MKR DOCD: CPT | Performed by: NURSE PRACTITIONER

## 2022-12-21 PROCEDURE — G8417 CALC BMI ABV UP PARAM F/U: HCPCS | Performed by: NURSE PRACTITIONER

## 2022-12-21 PROCEDURE — G8399 PT W/DXA RESULTS DOCUMENT: HCPCS | Performed by: NURSE PRACTITIONER

## 2022-12-21 PROCEDURE — 36415 COLL VENOUS BLD VENIPUNCTURE: CPT

## 2022-12-21 PROCEDURE — 3074F SYST BP LT 130 MM HG: CPT | Performed by: NURSE PRACTITIONER

## 2022-12-21 PROCEDURE — G8484 FLU IMMUNIZE NO ADMIN: HCPCS | Performed by: NURSE PRACTITIONER

## 2022-12-21 PROCEDURE — 1090F PRES/ABSN URINE INCON ASSESS: CPT | Performed by: NURSE PRACTITIONER

## 2022-12-21 RX ORDER — VENLAFAXINE HYDROCHLORIDE 150 MG/1
150 CAPSULE, EXTENDED RELEASE ORAL DAILY
Qty: 90 CAPSULE | Refills: 3 | Status: SHIPPED | OUTPATIENT
Start: 2022-12-21

## 2022-12-21 RX ORDER — ANASTROZOLE 1 MG/1
1 TABLET ORAL DAILY
Qty: 90 TABLET | Refills: 3 | Status: SHIPPED | OUTPATIENT
Start: 2022-12-21

## 2022-12-21 NOTE — PROGRESS NOTES
[facility] Hematology and Oncology: Office Visit Established Patient    Chief Complaint:    Chief Complaint   Patient presents with    Follow-up         History of Present Illness:  Ms. Marin Taveras is a 66 y.o. female who returns today for management of breast cancer. She has a history of lumpectomy in 1999 for breast cancer, she completed radiation and 5 years of tamoxifen. She presented for routine screening mammogram on 11/15/17 which identified a new, incompletely  characterized, posterior right breast mass. Additional imaging with right breast ultrasound on 11/17/18 confirmed a 2.4 cm x 1 cm x 1.8 cm hypoechoic abnormality at the level of a prior scar. MRI, performed on 11/28/17, demonstrated a 2.1 cm x 1.6  cm x 1.7 cm enhancing mass at the level of the patient's right breast lumpectomy bed concerning for locally recurrent malignancy with evidence for involvement of the directly adjacent pectoralis muscle. Biopsy showed grade 1 IDC with lobular features,  ER 99%/AL 59% positive, HER-2 negative (0), low grade, well differentiated, infiltrating duct carcinoma with lobular features in association with ductal carcinoma in situ, intermediate grade (cribriform type), with no definite lymphovascular invasion  identified. Secondary to her previous right breast cancer and treatment with radiation, patient was recommended for mastectomy and sentinel node biopsy. This showed the tumor to be 2.4 cm in greatest dimension with carcinoma less than 0.1 cm from marked  deep margin (invading skeletal muscle). All 4 biopsied nodes were negative for metastatic carcinoma. She was referred to oncology for evaluation and treatment of her newly diagnosed right breast cancer. We recommended Oncotype Dx analysis for risk stratification,  and radiation oncology referral for consideration of PMRT. Oncotype Dx reviewed and low risk at 25. Therefore, I would recommend endocrine therapy alone, with Arimidex.    Radiation was completed in August 2018. She is here today for routine 6 month follow up on Arimidex. Overall, she continues to tolerate treatment well. There are no GI or bowel complaints. Appetite is good and weight is stable. Energy level is normal.  There is no shortness of breath or edema. RUE lymphedema is present, compliant with compression sleeve and PT/massage. Hot flashes are now less severe than previous, remains on effexor 150 mg daily. Arthralgias are stable, some days require ibuprofen. Review of Systems:  Constitutional: Negative. HENT: Negative. Eyes: Negative. Respiratory: Negative. Cardiovascular: Negative. Gastrointestinal: Negative. Genitourinary: Negative. Musculoskeletal: Positive for joint pains. Skin: Negative. Neurological: Negative. Endo/Heme/Allergies: Positive for hot flashes. Psychiatric/Behavioral: Negative. All other systems reviewed and are negative.        Allergies   Allergen Reactions    Buspirone Other (See Comments)    Iron Other (See Comments)     constipation    Lac Bovis Other (See Comments)     Causes stomach problems    Nitrofurantoin Swelling     Lips and nose, itching    Zolpidem Other (See Comments)     Makes her dream     Past Medical History:   Diagnosis Date    Abnormal results of thyroid function studies     Anemia     Anxiety     BPV (benign positional vertigo)     Breast cancer ([facility] [address] 2018    right    Breast cancer ([facility] [address] 1999    right---radiation and 5 years of tamoxifen--lumpectomy    Chronic depression     Patient denies    Chronic tension headache     Diabetes mellitus type II, controlled ([facility] [address]     Does not check, Takes PO and Saxenda, Last A1C 6.0 in 2017    Elevated glucose     Encounter for long-term (current) use of high-risk medication     Encounter for long-term (current) use of medications     Fatigue     Heart palpitations     Hematuria     Hot flashes, menopausal     Hyperlipidemia     Hypertension, benign     Insomnia Menopausal disorder     Microcytosis     Obesity      Past Surgical History:   Procedure Laterality Date    BREAST BIOPSY  1999    BREAST LUMPECTOMY Right 1999    BREAST RECONSTRUCTION Right 2/21/2018    BREAST RECONSTRUCTION RIGHT performed by Mar Millan MD at [address] Right 6/4/2019    RIGHT BREAST TISSUE EXPANDER  EXCHANGE FOR PERMANENT IMPLANT performed by Khloe Quan MD at [address] [facility] Left 6/4/2019    LEFT BREAST MASTOPEXY performed by Khloe Quan MD at [facility] Right 11/2017    with IOL    CATARACT REMOVAL Left 10/2017     with IOL    HYSTERECTOMY (CERVIX STATUS UNKNOWN)      partial    MASTECTOMY Right 2/21/2018    BREAST MASTECTOMY SIMPLE RIGHT performed by Jamin Ordaz MD at [address] [facility] (CERVIX NOT REMOVED)  1979    still w/ ovaries    US BREAST NEEDLE BIOPSY RIGHT Right 12/5/2017    US BREAST NEEDLE BIOPSY RIGHT 12/5/2017 [facility]     Family History   Problem Relation Age of Onset    Cancer Mother         Uterine    Cancer Maternal Grandfather         Lung     No Known Problems Paternal Grandmother     Hypertension Mother     Uterine Cancer Mother     Stroke Father     Breast Cancer Neg Hx     No Known Problems Paternal Grandfather      Social History     Socioeconomic History    Marital status:       Spouse name: Not on file    Number of children: Not on file    Years of education: Not on file    Highest education level: Not on file   Occupational History    Not on file   Tobacco Use    Smoking status: Never    Smokeless tobacco: Never   Substance and Sexual Activity    Alcohol use: No    Drug use: Never    Sexual activity: Not on file   Other Topics Concern    Not on file   Social History Narrative    Not on file     Social Determinants of Health     Financial Resource Strain: Not on file   Food Insecurity: Not on file   Transportation Needs: Not on file   Physical Activity: Not on file   Stress: Not on file   Social Connections: Not on file   Intimate Partner Violence: Not on file   Housing Stability: Not on file     Current Outpatient Medications   Medication Sig Dispense Refill    ezetimibe (ZETIA) 10 MG tablet TAKE 1 TABLET BY MOUTH EVERY DAY      ALPRAZolam (XANAX) 0.5 MG tablet Take 0.5 mg by mouth. amLODIPine (NORVASC) 5 MG tablet Take 5 mg by mouth daily      anastrozole (ARIMIDEX) 1 MG tablet Take 1 mg by mouth daily      nebivolol (BYSTOLIC) 5 MG tablet Take 5 mg by mouth daily      spironolactone (ALDACTONE) 25 MG tablet Take 25 mg by mouth daily      venlafaxine (EFFEXOR XR) 150 MG extended release capsule Take 150 mg by mouth daily       No current facility-administered medications for this visit. OBJECTIVE:  /66   Pulse 85   Temp 98.1 °F (36.7 °C)   Resp 16   Ht 5' 1.5\" (1.562 m)   Wt 167 lb 3.2 oz (75.8 kg)   SpO2 98%   BMI 31.08 kg/m²   Pain Score:   0 - No pain    ECO    Physical Exam:  Constitutional: Well developed, well nourished female in no acute distress, sitting comfortably in the exam room chair. HEENT: Normocephalic and atraumatic. Sclerae anicteric. Neck supple without JVD. No thyromegaly present. Lymph node   No palpable submandibular, cervical, supraclavicular, or axillary lymph nodes. +RUE lymphedema, compression sleeve present   Skin Warm and dry. No bruising and no rash noted. No erythema. No pallor. Respiratory Lungs are clear to auscultation bilaterally without wheezes, rales or rhonchi, normal air exchange without accessory muscle use. CVS Normal rate, regular rhythm and normal S1 and S2. No murmurs, gallops, or rubs. Abdomen Soft, nontender and nondistended, normoactive bowel sounds. Neuro Grossly nonfocal with no obvious sensory or motor deficits. MSK Normal range of motion in general.  No edema and no tenderness. Psych Appropriate mood and affect.       Labs:  Recent Results (from the past 96 hour(s)) CBC with Auto Differential    Collection Time: 12/21/22  2:10 PM   Result Value Ref Range    WBC 7.0 4.3 - 11.1 K/uL    RBC 5.19 4.05 - 5.2 M/uL    Hemoglobin 12.7 11.7 - 15.4 g/dL    Hematocrit 42.0 35.8 - 46.3 %    MCV 80.9 (L) 82.0 - 102.0 FL    MCH 24.5 (L) 26.1 - 32.9 PG    MCHC 30.2 (L) 31.4 - 35.0 g/dL    RDW 15.5 (H) 11.9 - 14.6 %    Platelets 878 592 - 753 K/uL    MPV 8.8 (L) 9.4 - 12.3 FL    nRBC 0.00 0.0 - 0.2 K/uL    Seg Neutrophils 48 43 - 78 %    Lymphocytes 37 13 - 44 %    Monocytes 12 4.0 - 12.0 %    Eosinophils % 1 0.5 - 7.8 %    Basophils 1 0.0 - 2.0 %    Immature Granulocytes 1 0.0 - 5.0 %    Segs Absolute 3.4 1.7 - 8.2 K/UL    Absolute Lymph # 2.5 0.5 - 4.6 K/UL    Absolute Mono # 0.8 0.1 - 1.3 K/UL    Absolute Eos # 0.1 0.0 - 0.8 K/UL    Basophils Absolute 0.0 0.0 - 0.2 K/UL    Absolute Immature Granulocyte 0.1 0.0 - 0.5 K/UL    Differential Type AUTOMATED     Comprehensive Metabolic Panel    Collection Time: 12/21/22  2:10 PM   Result Value Ref Range    Sodium 140 133 - 143 mmol/L    Potassium 3.8 3.5 - 5.1 mmol/L    Chloride 108 101 - 110 mmol/L    CO2 30 21 - 32 mmol/L    Anion Gap 2 2 - 11 mmol/L    Glucose 116 (H) 65 - 100 mg/dL    BUN 14 8 - 23 MG/DL    Creatinine 1.60 (H) 0.6 - 1.0 MG/DL    Est, Glom Filt Rate 34 (L) >60 ml/min/1.73m2    Calcium 10.4 8.3 - 10.4 MG/DL    Total Bilirubin 0.3 0.2 - 1.1 MG/DL    ALT 32 12 - 65 U/L    AST 16 15 - 37 U/L    Alk Phosphatase 95 50 - 136 U/L    Total Protein 7.7 6.3 - 8.2 g/dL    Albumin 3.7 3.2 - 4.6 g/dL    Globulin 4.0 2.8 - 4.5 g/dL    Albumin/Globulin Ratio 0.9 0.4 - 1.6         Imaging:  No results found. ASSESSMENT:   Diagnosis Orders   1. Malignant neoplasm of female breast, unspecified estrogen receptor status, unspecified laterality, unspecified site of breast ([facility] [address]        2. Encounter for screening mammogram for malignant neoplasm of breast  MIKE DIGITAL SCREEN UNILATERAL LEFT      3.  Localized osteoporosis ([place])   DEXA BONE DENSITY AXIAL SKELETON      4. Hot flashes related to aromatase inhibitor therapy        5. Aromatase inhibitor-associated arthralgia              PLAN:  Lab studies were personally reviewed. Breast cancer: 2.4 cm on surgical pathology, invasion into pectoralis but margins negative (<0.1 cm). S/p mastectomy and SLNBx, 0/4 nodes involved with tumor. ER 99%, KS 59%, HER2 negative. [id number]  (stage IIA). Although she had noted skeletal muscle involvement, this is still not considered T4 based on the AJCC staging guidelines because \"only pectoralis invasion or adherence\" is specifically excluded from T4 classification. Therefore, I would  consider this a T2 lesion. Because of the ER/KS positivity, the negative lobo assessment, and the T2 tumor, she would be an appropriate candidate for Oncotype Dx for risk stratification. This  was low risk at 25. Therefore, I would recommend endocrine therapy alone, with Arimidex. We also recommended she meet with radiation oncology due to the close deep margin on pathology. This was completed in August 2018. She is here today for routine 6 month follow up on Arimidex. Overall, she continues to tolerate treatment well. There are no GI or bowel complaints. Appetite is good and weight is stable. Energy level is normal.  There is no shortness of breath or edema. RUE lymphedema is present, compliant with compression sleeve and PT/massage. Hot flashes are now less severe than previous, remains on effexor 150 mg daily. Arthralgias are stable, some days require ibuprofen. Labs reviewed and stable. She will continue on Arimidex/Effexor daily. Annual left mammogram is due in March 2023 along with repeat DEXA. All questions were asked and answered to the best of my ability. F/u in 6 months to assess AI tolerance, we would continue this through at least summer of 2023, although extended endocrine therapy would be reasonable.            Jese Staff, APRN - [id number]  [address] [place] [address]  Office : [phone]  Fax : [phone]

## 2023-03-23 ENCOUNTER — HOSPITAL ENCOUNTER (OUTPATIENT)
Dept: MAMMOGRAPHY | Age: 73
End: 2023-03-23
Payer: MEDICARE

## 2023-03-23 ENCOUNTER — HOSPITAL ENCOUNTER (OUTPATIENT)
Dept: MAMMOGRAPHY | Age: 73
Discharge: HOME | End: 2023-03-23
Payer: MEDICARE

## 2023-03-23 DIAGNOSIS — Z12.31 ENCOUNTER FOR SCREENING MAMMOGRAM FOR MALIGNANT NEOPLASM OF BREAST: ICD-10-CM

## 2023-03-23 DIAGNOSIS — Z79.811 LONG TERM (CURRENT) USE OF AROMATASE INHIBITORS: ICD-10-CM

## 2023-03-23 PROCEDURE — 77067 SCR MAMMO BI INCL CAD: CPT

## 2023-03-23 PROCEDURE — 77080 DXA BONE DENSITY AXIAL: CPT

## 2023-06-20 DIAGNOSIS — C50.919 MALIGNANT NEOPLASM OF FEMALE BREAST, UNSPECIFIED ESTROGEN RECEPTOR STATUS, UNSPECIFIED LATERALITY, UNSPECIFIED SITE OF BREAST (HCC): Primary | ICD-10-CM

## 2023-10-09 ENCOUNTER — OFFICE VISIT (OUTPATIENT)
Dept: ONCOLOGY | Age: 73
End: 2023-10-09
Payer: MEDICARE

## 2023-10-09 ENCOUNTER — HOSPITAL ENCOUNTER (OUTPATIENT)
Dept: LAB | Age: 73
Discharge: HOME | End: 2023-10-12
Payer: MEDICARE

## 2023-10-09 VITALS
HEART RATE: 78 BPM | BODY MASS INDEX: 28.23 KG/M2 | TEMPERATURE: 97.9 F | HEIGHT: 62 IN | WEIGHT: 153.4 LBS | SYSTOLIC BLOOD PRESSURE: 125 MMHG | RESPIRATION RATE: 16 BRPM | OXYGEN SATURATION: 100 % | DIASTOLIC BLOOD PRESSURE: 72 MMHG

## 2023-10-09 DIAGNOSIS — D64.9 ANEMIA, UNSPECIFIED TYPE: ICD-10-CM

## 2023-10-09 DIAGNOSIS — C50.919 MALIGNANT NEOPLASM OF FEMALE BREAST, UNSPECIFIED ESTROGEN RECEPTOR STATUS, UNSPECIFIED LATERALITY, UNSPECIFIED SITE OF BREAST (HCC): ICD-10-CM

## 2023-10-09 DIAGNOSIS — R23.2 HOT FLASHES RELATED TO AROMATASE INHIBITOR THERAPY: ICD-10-CM

## 2023-10-09 DIAGNOSIS — Z51.81 ENCOUNTER FOR MONITORING AROMATASE INHIBITOR THERAPY: ICD-10-CM

## 2023-10-09 DIAGNOSIS — T45.1X5A HOT FLASHES RELATED TO AROMATASE INHIBITOR THERAPY: ICD-10-CM

## 2023-10-09 DIAGNOSIS — Z79.811 ENCOUNTER FOR MONITORING AROMATASE INHIBITOR THERAPY: ICD-10-CM

## 2023-10-09 DIAGNOSIS — C50.919 MALIGNANT NEOPLASM OF FEMALE BREAST, UNSPECIFIED ESTROGEN RECEPTOR STATUS, UNSPECIFIED LATERALITY, UNSPECIFIED SITE OF BREAST (HCC): Primary | ICD-10-CM

## 2023-10-09 DIAGNOSIS — Z12.31 ENCOUNTER FOR SCREENING MAMMOGRAM FOR MALIGNANT NEOPLASM OF BREAST: ICD-10-CM

## 2023-10-09 LAB
ALBUMIN SERPL-MCNC: 3.5 G/DL (ref 3.2–4.6)
ALBUMIN/GLOB SERPL: 0.9 (ref 0.4–1.6)
ALP SERPL-CCNC: 75 U/L (ref 50–136)
ALT SERPL-CCNC: 21 U/L (ref 12–65)
ANION GAP SERPL CALC-SCNC: 4 MMOL/L (ref 2–11)
AST SERPL-CCNC: 12 U/L (ref 15–37)
BASOPHILS # BLD: 0 K/UL (ref 0–0.2)
BASOPHILS NFR BLD: 1 % (ref 0–2)
BILIRUB SERPL-MCNC: 0.2 MG/DL (ref 0.2–1.1)
BUN SERPL-MCNC: 16 MG/DL (ref 8–23)
CALCIUM SERPL-MCNC: 10.2 MG/DL (ref 8.3–10.4)
CHLORIDE SERPL-SCNC: 114 MMOL/L (ref 101–110)
CO2 SERPL-SCNC: 26 MMOL/L (ref 21–32)
CREAT SERPL-MCNC: 1.5 MG/DL (ref 0.6–1)
DIFFERENTIAL METHOD BLD: ABNORMAL
EOSINOPHIL # BLD: 0.1 K/UL (ref 0–0.8)
EOSINOPHIL NFR BLD: 1 % (ref 0.5–7.8)
ERYTHROCYTE [DISTWIDTH] IN BLOOD BY AUTOMATED COUNT: 15.1 % (ref 11.9–14.6)
FERRITIN SERPL-MCNC: 153 NG/ML (ref 8–388)
GLOBULIN SER CALC-MCNC: 3.7 G/DL (ref 2.8–4.5)
GLUCOSE SERPL-MCNC: 96 MG/DL (ref 65–100)
HCT VFR BLD AUTO: 34.3 % (ref 35.8–46.3)
HGB BLD-MCNC: 10.3 G/DL (ref 11.7–15.4)
IMM GRANULOCYTES # BLD AUTO: 0.1 K/UL (ref 0–0.5)
IMM GRANULOCYTES NFR BLD AUTO: 1 % (ref 0–5)
IRON SATN MFR SERPL: 31 %
IRON SERPL-MCNC: 84 UG/DL (ref 35–150)
LYMPHOCYTES # BLD: 2.1 K/UL (ref 0.5–4.6)
LYMPHOCYTES NFR BLD: 32 % (ref 13–44)
MCH RBC QN AUTO: 23.9 PG (ref 26.1–32.9)
MCHC RBC AUTO-ENTMCNC: 30 G/DL (ref 31.4–35)
MCV RBC AUTO: 79.6 FL (ref 82–102)
MONOCYTES # BLD: 0.7 K/UL (ref 0.1–1.3)
MONOCYTES NFR BLD: 11 % (ref 4–12)
NEUTS SEG # BLD: 3.5 K/UL (ref 1.7–8.2)
NEUTS SEG NFR BLD: 54 % (ref 43–78)
NRBC # BLD: 0 K/UL (ref 0–0.2)
PLATELET # BLD AUTO: 236 K/UL (ref 150–450)
PMV BLD AUTO: 8.9 FL (ref 9.4–12.3)
POTASSIUM SERPL-SCNC: 3.7 MMOL/L (ref 3.5–5.1)
PROT SERPL-MCNC: 7.2 G/DL (ref 6.3–8.2)
RBC # BLD AUTO: 4.31 M/UL (ref 4.05–5.2)
SODIUM SERPL-SCNC: 144 MMOL/L (ref 133–143)
TIBC SERPL-MCNC: 267 UG/DL (ref 250–450)
WBC # BLD AUTO: 6.5 K/UL (ref 4.3–11.1)

## 2023-10-09 PROCEDURE — 1090F PRES/ABSN URINE INCON ASSESS: CPT | Performed by: INTERNAL MEDICINE

## 2023-10-09 PROCEDURE — 3074F SYST BP LT 130 MM HG: CPT | Performed by: INTERNAL MEDICINE

## 2023-10-09 PROCEDURE — G8399 PT W/DXA RESULTS DOCUMENT: HCPCS | Performed by: INTERNAL MEDICINE

## 2023-10-09 PROCEDURE — 3078F DIAST BP <80 MM HG: CPT | Performed by: INTERNAL MEDICINE

## 2023-10-09 PROCEDURE — G8417 CALC BMI ABV UP PARAM F/U: HCPCS | Performed by: INTERNAL MEDICINE

## 2023-10-09 PROCEDURE — G8484 FLU IMMUNIZE NO ADMIN: HCPCS | Performed by: INTERNAL MEDICINE

## 2023-10-09 PROCEDURE — 82728 ASSAY OF FERRITIN: CPT

## 2023-10-09 PROCEDURE — 1123F ACP DISCUSS/DSCN MKR DOCD: CPT | Performed by: INTERNAL MEDICINE

## 2023-10-09 PROCEDURE — 99214 OFFICE O/P EST MOD 30 MIN: CPT | Performed by: INTERNAL MEDICINE

## 2023-10-09 PROCEDURE — G8428 CUR MEDS NOT DOCUMENT: HCPCS | Performed by: INTERNAL MEDICINE

## 2023-10-09 PROCEDURE — 3017F COLORECTAL CA SCREEN DOC REV: CPT | Performed by: INTERNAL MEDICINE

## 2023-10-09 PROCEDURE — 85025 COMPLETE CBC W/AUTO DIFF WBC: CPT

## 2023-10-09 PROCEDURE — 80053 COMPREHEN METABOLIC PANEL: CPT

## 2023-10-09 PROCEDURE — 83550 IRON BINDING TEST: CPT

## 2023-10-09 PROCEDURE — 36415 COLL VENOUS BLD VENIPUNCTURE: CPT

## 2023-10-09 PROCEDURE — 83540 ASSAY OF IRON: CPT

## 2023-10-09 PROCEDURE — 1036F TOBACCO NON-USER: CPT | Performed by: INTERNAL MEDICINE

## 2023-10-09 ASSESSMENT — PATIENT HEALTH QUESTIONNAIRE - PHQ9
SUM OF ALL RESPONSES TO PHQ QUESTIONS 1-9: 0
SUM OF ALL RESPONSES TO PHQ QUESTIONS 1-9: 0
2. FEELING DOWN, DEPRESSED OR HOPELESS: 0
SUM OF ALL RESPONSES TO PHQ9 QUESTIONS 1 & 2: 0
SUM OF ALL RESPONSES TO PHQ QUESTIONS 1-9: 0
1. LITTLE INTEREST OR PLEASURE IN DOING THINGS: 0
SUM OF ALL RESPONSES TO PHQ QUESTIONS 1-9: 0

## 2023-10-09 NOTE — PATIENT INSTRUCTIONS
Patient Instructions from Today's Visit    Reason for Visit:  Follow up Breast Cancer     Diagnosis Information:  [email or web address]    Plan:  Lab and scan results reviewed   Left mammogram due after  3/23/24 (last 3/23/23)  Call [phone] to schedule     Follow Up:   Follow up in     Recent Lab Results:  Hospital Outpatient Visit on 10/09/2023   Component Date Value Ref Range Status    WBC 10/09/2023 6.5  4.3 - 11.1 K/uL Final    RBC 10/09/2023 4.31  4.05 - 5.2 M/uL Final    Hemoglobin 10/09/2023 10.3 (L)  11.7 - 15.4 g/dL Final    Hematocrit 10/09/2023 34.3 (L)  35.8 - 46.3 % Final    MCV 10/09/2023 79.6 (L)  82.0 - 102.0 FL Final    MCH 10/09/2023 23.9 (L)  26.1 - 32.9 PG Final    MCHC 10/09/2023 30.0 (L)  31.4 - 35.0 g/dL Final    RDW 10/09/2023 15.1 (H)  11.9 - 14.6 % Final    Platelets 97/07/4309 236  150 - 450 K/uL Final    MPV 10/09/2023 8.9 (L)  9.4 - 12.3 FL Final    nRBC 10/09/2023 0.00  0.0 - 0.2 K/uL Final    **Note: Absolute NRBC parameter is now reported with Hemogram**    Differential Type 10/09/2023 AUTOMATED    Final    Neutrophils % 10/09/2023 54  43 - 78 % Final    Lymphocytes % 10/09/2023 32  13 - 44 % Final    Monocytes % 10/09/2023 11  4.0 - 12.0 % Final    Eosinophils % 10/09/2023 1  0.5 - 7.8 % Final    Basophils % 10/09/2023 1  0.0 - 2.0 % Final    Immature Granulocytes 10/09/2023 1  0.0 - 5.0 % Final    Neutrophils Absolute 10/09/2023 3.5  1.7 - 8.2 K/UL Final    Lymphocytes Absolute 10/09/2023 2.1  0.5 - 4.6 K/UL Final    Monocytes Absolute 10/09/2023 0.7  0.1 - 1.3 K/UL Final    Eosinophils Absolute 10/09/2023 0.1  0.0 - 0.8 K/UL Final    Basophils Absolute 10/09/2023 0.0  0.0 - 0.2 K/UL Final    Absolute Immature Granulocyte 10/09/2023 0.1  0.0 - 0.5 K/UL Final     Treatment Summary has been discussed and given to patient:

## 2023-10-09 NOTE — PROGRESS NOTES
in August 2018. She is here today for routine 6 month follow up on Arimidex. She is doing OK. However, she admits she is having multiple symptoms that she thinks are related to the AI, including hot flashes, insomnia, joint stiffness after inactivity. She is doing well otherwise. There are no GI or bowel complaints. Appetite is good and weight is stable. Energy level is normal, ECOG 0. There is no shortness of breath or edema. No fevers or infectious symptoms. Review of Systems:  Constitutional: Negative. HENT: Negative. Eyes: Negative. Respiratory: Negative. Cardiovascular: Negative. Gastrointestinal: Negative. Genitourinary: Negative. Musculoskeletal: Positive for joint pains. Skin: Negative. Neurological: Negative. Endo/Heme/Allergies: Positive for hot flashes. Psychiatric/Behavioral: Negative. All other systems reviewed and are negative.        Allergies   Allergen Reactions    Buspirone Other (See Comments)    Iron Other (See Comments)     constipation    Milk (Cow) Other (See Comments)     Causes stomach problems    Nitrofurantoin Swelling     Lips and nose, itching    Zolpidem Other (See Comments)     Makes her dream     Past Medical History:   Diagnosis Date    Abnormal results of thyroid function studies     Anemia     Anxiety     BPV (benign positional vertigo)     Breast cancer ([address]) 2018    right    Breast cancer ([address]) 1999    right---radiation and 5 years of tamoxifen--lumpectomy    Chronic depression     Patient denies    Chronic tension headache     Diabetes mellitus type II, controlled ([address])     Does not check, Takes PO and Saxenda, Last A1C 6.0 in 2017    Elevated glucose     Encounter for long-term (current) use of high-risk medication     Encounter for long-term (current) use of medications     Fatigue     Heart palpitations     Hematuria     Hot flashes, menopausal     Hyperlipidemia     Hypertension, benign     Insomnia     Menopausal disorder

## 2024-03-03 DIAGNOSIS — T45.1X5A HOT FLASHES RELATED TO AROMATASE INHIBITOR THERAPY: ICD-10-CM

## 2024-03-03 DIAGNOSIS — R23.2 HOT FLASHES RELATED TO AROMATASE INHIBITOR THERAPY: ICD-10-CM

## 2024-03-20 RX ORDER — VENLAFAXINE HYDROCHLORIDE 150 MG/1
CAPSULE, EXTENDED RELEASE ORAL DAILY
Qty: 90 CAPSULE | Refills: 3 | OUTPATIENT
Start: 2024-03-20

## 2024-03-28 DIAGNOSIS — C50.919 MALIGNANT NEOPLASM OF FEMALE BREAST, UNSPECIFIED ESTROGEN RECEPTOR STATUS, UNSPECIFIED LATERALITY, UNSPECIFIED SITE OF BREAST (HCC): ICD-10-CM

## 2024-03-28 DIAGNOSIS — D64.9 ANEMIA, UNSPECIFIED TYPE: Primary | ICD-10-CM

## 2024-04-05 ENCOUNTER — HOSPITAL ENCOUNTER (OUTPATIENT)
Dept: MAMMOGRAPHY | Age: 74
End: 2024-04-05
Attending: INTERNAL MEDICINE
Payer: MEDICARE

## 2024-04-05 VITALS — WEIGHT: 153 LBS | BODY MASS INDEX: 28.89 KG/M2 | HEIGHT: 61 IN

## 2024-04-05 DIAGNOSIS — Z12.31 ENCOUNTER FOR SCREENING MAMMOGRAM FOR MALIGNANT NEOPLASM OF BREAST: ICD-10-CM

## 2024-04-05 PROCEDURE — 77063 BREAST TOMOSYNTHESIS BI: CPT

## 2024-04-09 ENCOUNTER — OFFICE VISIT (OUTPATIENT)
Dept: ONCOLOGY | Age: 74
End: 2024-04-09
Payer: MEDICARE

## 2024-04-09 ENCOUNTER — HOSPITAL ENCOUNTER (OUTPATIENT)
Dept: LAB | Age: 74
Discharge: HOME | End: 2024-04-12
Payer: MEDICARE

## 2024-04-09 VITALS
OXYGEN SATURATION: 100 % | HEART RATE: 58 BPM | TEMPERATURE: 98.4 F | BODY MASS INDEX: 26.87 KG/M2 | DIASTOLIC BLOOD PRESSURE: 71 MMHG | RESPIRATION RATE: 16 BRPM | HEIGHT: 62 IN | SYSTOLIC BLOOD PRESSURE: 122 MMHG | WEIGHT: 146 LBS

## 2024-04-09 DIAGNOSIS — C50.919 MALIGNANT NEOPLASM OF FEMALE BREAST, UNSPECIFIED ESTROGEN RECEPTOR STATUS, UNSPECIFIED LATERALITY, UNSPECIFIED SITE OF BREAST (HCC): ICD-10-CM

## 2024-04-09 DIAGNOSIS — C50.911 MALIGNANT NEOPLASM OF RIGHT BREAST IN FEMALE, ESTROGEN RECEPTOR POSITIVE, UNSPECIFIED SITE OF BREAST (HCC): Primary | ICD-10-CM

## 2024-04-09 DIAGNOSIS — D64.9 ANEMIA, UNSPECIFIED TYPE: ICD-10-CM

## 2024-04-09 DIAGNOSIS — Z17.0 MALIGNANT NEOPLASM OF RIGHT BREAST IN FEMALE, ESTROGEN RECEPTOR POSITIVE, UNSPECIFIED SITE OF BREAST (HCC): Primary | ICD-10-CM

## 2024-04-09 DIAGNOSIS — Z12.31 ENCOUNTER FOR SCREENING MAMMOGRAM FOR MALIGNANT NEOPLASM OF BREAST: ICD-10-CM

## 2024-04-09 LAB
ALBUMIN SERPL-MCNC: 3.6 G/DL (ref 3.2–4.6)
ALBUMIN/GLOB SERPL: 1 (ref 0.4–1.6)
ALP SERPL-CCNC: 76 U/L (ref 50–136)
ALT SERPL-CCNC: 26 U/L (ref 12–65)
ANION GAP SERPL CALC-SCNC: 5 MMOL/L (ref 2–11)
AST SERPL-CCNC: 15 U/L (ref 15–37)
BASOPHILS # BLD: 0.1 K/UL (ref 0–0.2)
BASOPHILS NFR BLD: 1 % (ref 0–2)
BILIRUB SERPL-MCNC: 0.5 MG/DL (ref 0.2–1.1)
BUN SERPL-MCNC: 17 MG/DL (ref 8–23)
CALCIUM SERPL-MCNC: 10.5 MG/DL (ref 8.3–10.4)
CHLORIDE SERPL-SCNC: 112 MMOL/L (ref 103–113)
CO2 SERPL-SCNC: 24 MMOL/L (ref 21–32)
CREAT SERPL-MCNC: 1.6 MG/DL (ref 0.6–1)
DIFFERENTIAL METHOD BLD: ABNORMAL
EOSINOPHIL # BLD: 0.1 K/UL (ref 0–0.8)
EOSINOPHIL NFR BLD: 1 % (ref 0.5–7.8)
ERYTHROCYTE [DISTWIDTH] IN BLOOD BY AUTOMATED COUNT: 16.7 % (ref 11.9–14.6)
FERRITIN SERPL-MCNC: 130 NG/ML (ref 8–388)
GLOBULIN SER CALC-MCNC: 3.7 G/DL (ref 2.8–4.5)
GLUCOSE SERPL-MCNC: 98 MG/DL (ref 65–100)
HCT VFR BLD AUTO: 35.7 % (ref 35.8–46.3)
HGB BLD-MCNC: 10.8 G/DL (ref 11.7–15.4)
IMM GRANULOCYTES # BLD AUTO: 0.1 K/UL (ref 0–0.5)
IMM GRANULOCYTES NFR BLD AUTO: 1 % (ref 0–5)
IRON SATN MFR SERPL: 31 %
IRON SERPL-MCNC: 87 UG/DL (ref 35–150)
LYMPHOCYTES # BLD: 1.8 K/UL (ref 0.5–4.6)
LYMPHOCYTES NFR BLD: 27 % (ref 13–44)
MCH RBC QN AUTO: 23.8 PG (ref 26.1–32.9)
MCHC RBC AUTO-ENTMCNC: 30.3 G/DL (ref 31.4–35)
MCV RBC AUTO: 78.6 FL (ref 82–102)
MONOCYTES # BLD: 0.6 K/UL (ref 0.1–1.3)
MONOCYTES NFR BLD: 9 % (ref 4–12)
NEUTS SEG # BLD: 4 K/UL (ref 1.7–8.2)
NEUTS SEG NFR BLD: 61 % (ref 43–78)
NRBC # BLD: 0 K/UL (ref 0–0.2)
PLATELET # BLD AUTO: 226 K/UL (ref 150–450)
PMV BLD AUTO: 8.8 FL (ref 9.4–12.3)
POTASSIUM SERPL-SCNC: 4.3 MMOL/L (ref 3.5–5.1)
PROT SERPL-MCNC: 7.3 G/DL (ref 6.3–8.2)
RBC # BLD AUTO: 4.54 M/UL (ref 4.05–5.2)
SODIUM SERPL-SCNC: 141 MMOL/L (ref 136–146)
TIBC SERPL-MCNC: 284 UG/DL (ref 250–450)
WBC # BLD AUTO: 6.6 K/UL (ref 4.3–11.1)

## 2024-04-09 PROCEDURE — 1090F PRES/ABSN URINE INCON ASSESS: CPT | Performed by: INTERNAL MEDICINE

## 2024-04-09 PROCEDURE — 85025 COMPLETE CBC W/AUTO DIFF WBC: CPT

## 2024-04-09 PROCEDURE — G8417 CALC BMI ABV UP PARAM F/U: HCPCS | Performed by: INTERNAL MEDICINE

## 2024-04-09 PROCEDURE — G8428 CUR MEDS NOT DOCUMENT: HCPCS | Performed by: INTERNAL MEDICINE

## 2024-04-09 PROCEDURE — 36415 COLL VENOUS BLD VENIPUNCTURE: CPT

## 2024-04-09 PROCEDURE — 3017F COLORECTAL CA SCREEN DOC REV: CPT | Performed by: INTERNAL MEDICINE

## 2024-04-09 PROCEDURE — 1036F TOBACCO NON-USER: CPT | Performed by: INTERNAL MEDICINE

## 2024-04-09 PROCEDURE — 3078F DIAST BP <80 MM HG: CPT | Performed by: INTERNAL MEDICINE

## 2024-04-09 PROCEDURE — 83540 ASSAY OF IRON: CPT

## 2024-04-09 PROCEDURE — 83550 IRON BINDING TEST: CPT

## 2024-04-09 PROCEDURE — 3074F SYST BP LT 130 MM HG: CPT | Performed by: INTERNAL MEDICINE

## 2024-04-09 PROCEDURE — 1123F ACP DISCUSS/DSCN MKR DOCD: CPT | Performed by: INTERNAL MEDICINE

## 2024-04-09 PROCEDURE — 82728 ASSAY OF FERRITIN: CPT

## 2024-04-09 PROCEDURE — G8399 PT W/DXA RESULTS DOCUMENT: HCPCS | Performed by: INTERNAL MEDICINE

## 2024-04-09 PROCEDURE — 80053 COMPREHEN METABOLIC PANEL: CPT

## 2024-04-09 PROCEDURE — 99214 OFFICE O/P EST MOD 30 MIN: CPT | Performed by: INTERNAL MEDICINE

## 2024-04-09 RX ORDER — VENLAFAXINE HYDROCHLORIDE 150 MG/1
150 CAPSULE, EXTENDED RELEASE ORAL DAILY
Qty: 90 CAPSULE | Refills: 3 | Status: SHIPPED | OUTPATIENT
Start: 2024-04-09

## 2024-04-09 RX ORDER — FERROUS SULFATE 325(65) MG
TABLET ORAL
COMMUNITY
Start: 2024-03-25

## 2024-04-09 ASSESSMENT — PATIENT HEALTH QUESTIONNAIRE - PHQ9
1. LITTLE INTEREST OR PLEASURE IN DOING THINGS: NOT AT ALL
SUM OF ALL RESPONSES TO PHQ QUESTIONS 1-9: 0
SUM OF ALL RESPONSES TO PHQ QUESTIONS 1-9: 0
SUM OF ALL RESPONSES TO PHQ9 QUESTIONS 1 & 2: 0
SUM OF ALL RESPONSES TO PHQ QUESTIONS 1-9: 0
2. FEELING DOWN, DEPRESSED OR HOPELESS: NOT AT ALL
SUM OF ALL RESPONSES TO PHQ QUESTIONS 1-9: 0

## 2024-04-09 NOTE — PATIENT INSTRUCTIONS
Patient Information from Today's Visit    Labs and symptoms reviewed.     Treatment Summary has been discussed and given to patient:N/A  Follow Up: 1 year    Please refer to After Visit Summary or [email or web address] for upcoming appointment information. If you have any questions regarding your upcoming schedule please reach out to your care team through [email or web address] or call [phone].    -------------------------------------------------------------------------------------------------------------------  Please call our office at [phone] if you have any  of the following symptoms:   Fever of 100.5 or greater  Chills  Shortness of breath  Swelling or pain in one leg    After office hours an answering service is available and will contact a provider for emergencies or if you are experiencing any of the above symptoms.    Patient did express an interest in My Chart.  My Chart log in information explained on the after visit summary printout at the check-out desk.    ROSMERY HARDEN RN

## 2024-04-09 NOTE — PROGRESS NOTES
Labs:  Recent Results (from the past 96 hour(s))   Ferritin    Collection Time: 04/09/24  1:08 PM   Result Value Ref Range    Ferritin 130 8 - 388 NG/ML   Transferrin Saturation    Collection Time: 04/09/24  1:08 PM   Result Value Ref Range    Iron 87 35 - 150 ug/dL    TIBC 284 250 - 450 ug/dL    TRANSFERRIN SATURATION 31 >20 %   Comprehensive Metabolic Panel    Collection Time: 04/09/24  1:08 PM   Result Value Ref Range    Sodium 141 136 - 146 mmol/L    Potassium 4.3 3.5 - 5.1 mmol/L    Chloride 112 103 - 113 mmol/L    CO2 24 21 - 32 mmol/L    Anion Gap 5 2 - 11 mmol/L    Glucose 98 65 - 100 mg/dL    BUN 17 8 - 23 MG/DL    Creatinine 1.60 (H) 0.6 - 1.0 MG/DL    Est, Glom Filt Rate 34 (L) >60 ml/min/1.73m2    Calcium 10.5 (H) 8.3 - 10.4 MG/DL    Total Bilirubin 0.5 0.2 - 1.1 MG/DL    ALT 26 12 - 65 U/L    AST 15 15 - 37 U/L    Alk Phosphatase 76 50 - 136 U/L    Total Protein 7.3 6.3 - 8.2 g/dL    Albumin 3.6 3.2 - 4.6 g/dL    Globulin 3.7 2.8 - 4.5 g/dL    Albumin/Globulin Ratio 1.0 0.4 - 1.6     CBC with Auto Differential    Collection Time: 04/09/24  1:08 PM   Result Value Ref Range    WBC 6.6 4.3 - 11.1 K/uL    RBC 4.54 4.05 - 5.2 M/uL    Hemoglobin 10.8 (L) 11.7 - 15.4 g/dL    Hematocrit 35.7 (L) 35.8 - 46.3 %    MCV 78.6 (L) 82.0 - 102.0 FL    MCH 23.8 (L) 26.1 - 32.9 PG    MCHC 30.3 (L) 31.4 - 35.0 g/dL    RDW 16.7 (H) 11.9 - 14.6 %    Platelets 226 150 - 450 K/uL    MPV 8.8 (L) 9.4 - 12.3 FL    nRBC 0.00 0.0 - 0.2 K/uL    Neutrophils % 61 43 - 78 %    Lymphocytes % 27 13 - 44 %    Monocytes % 9 4.0 - 12.0 %    Eosinophils % 1 0.5 - 7.8 %    Basophils % 1 0.0 - 2.0 %    Immature Granulocytes % 1 0.0 - 5.0 %    Neutrophils Absolute 4.0 1.7 - 8.2 K/UL    Lymphocytes Absolute 1.8 0.5 - 4.6 K/UL    Monocytes Absolute 0.6 0.1 - 1.3 K/UL    Eosinophils Absolute 0.1 0.0 - 0.8 K/UL    Basophils Absolute 0.1 0.0 - 0.2 K/UL    Immature Granulocytes Absolute 0.1 0.0 - 0.5 K/UL    Differential Type AUTOMATED

## 2025-04-09 ENCOUNTER — OFFICE VISIT (OUTPATIENT)
Dept: ONCOLOGY | Age: 75
End: 2025-04-09
Payer: MEDICARE

## 2025-04-09 VITALS
WEIGHT: 150 LBS | RESPIRATION RATE: 16 BRPM | BODY MASS INDEX: 27.6 KG/M2 | HEIGHT: 62 IN | TEMPERATURE: 97.8 F | DIASTOLIC BLOOD PRESSURE: 71 MMHG | SYSTOLIC BLOOD PRESSURE: 115 MMHG | OXYGEN SATURATION: 100 % | HEART RATE: 71 BPM

## 2025-04-09 DIAGNOSIS — Z17.0 MALIGNANT NEOPLASM OF RIGHT BREAST IN FEMALE, ESTROGEN RECEPTOR POSITIVE, UNSPECIFIED SITE OF BREAST: Primary | ICD-10-CM

## 2025-04-09 DIAGNOSIS — C50.911 MALIGNANT NEOPLASM OF RIGHT BREAST IN FEMALE, ESTROGEN RECEPTOR POSITIVE, UNSPECIFIED SITE OF BREAST: Primary | ICD-10-CM

## 2025-04-09 PROCEDURE — G8428 CUR MEDS NOT DOCUMENT: HCPCS | Performed by: INTERNAL MEDICINE

## 2025-04-09 PROCEDURE — 99213 OFFICE O/P EST LOW 20 MIN: CPT | Performed by: INTERNAL MEDICINE

## 2025-04-09 PROCEDURE — 1125F AMNT PAIN NOTED PAIN PRSNT: CPT | Performed by: INTERNAL MEDICINE

## 2025-04-09 PROCEDURE — 1036F TOBACCO NON-USER: CPT | Performed by: INTERNAL MEDICINE

## 2025-04-09 PROCEDURE — 3078F DIAST BP <80 MM HG: CPT | Performed by: INTERNAL MEDICINE

## 2025-04-09 PROCEDURE — 1090F PRES/ABSN URINE INCON ASSESS: CPT | Performed by: INTERNAL MEDICINE

## 2025-04-09 PROCEDURE — 3074F SYST BP LT 130 MM HG: CPT | Performed by: INTERNAL MEDICINE

## 2025-04-09 PROCEDURE — 3017F COLORECTAL CA SCREEN DOC REV: CPT | Performed by: INTERNAL MEDICINE

## 2025-04-09 PROCEDURE — 1123F ACP DISCUSS/DSCN MKR DOCD: CPT | Performed by: INTERNAL MEDICINE

## 2025-04-09 PROCEDURE — G8399 PT W/DXA RESULTS DOCUMENT: HCPCS | Performed by: INTERNAL MEDICINE

## 2025-04-09 PROCEDURE — G8417 CALC BMI ABV UP PARAM F/U: HCPCS | Performed by: INTERNAL MEDICINE

## 2025-04-09 RX ORDER — DAPAGLIFLOZIN 5 MG/1
5 TABLET, FILM COATED ORAL EVERY MORNING
COMMUNITY

## 2025-04-09 RX ORDER — VENLAFAXINE HYDROCHLORIDE 150 MG/1
150 CAPSULE, EXTENDED RELEASE ORAL DAILY
Qty: 90 CAPSULE | Refills: 3 | Status: SHIPPED | OUTPATIENT
Start: 2025-04-09

## 2025-04-09 ASSESSMENT — PATIENT HEALTH QUESTIONNAIRE - PHQ9
SUM OF ALL RESPONSES TO PHQ QUESTIONS 1-9: 0
1. LITTLE INTEREST OR PLEASURE IN DOING THINGS: NOT AT ALL
2. FEELING DOWN, DEPRESSED OR HOPELESS: NOT AT ALL
SUM OF ALL RESPONSES TO PHQ QUESTIONS 1-9: 0

## 2025-04-09 NOTE — PATIENT INSTRUCTIONS
Patient Information from Today's Visit    The members of your Oncology Medical Home are listed below:    Physician Provider: Mike Morillo Medical Oncologist  Advanced Practice Clinician: Meredith Carey NP  Registered Nurse: Bk VALDES RN  Nurse Navigator: Zita MAY RN or Manda CATALAN RN  Medical Assistant: Radha CEDENO CMA  : Maria Elena MANZO  Supportive Care Services: Andres DÍAZ LMSW    Diagnosis: Breast Cancer    Follow Up Instructions: 1 year      Treatment Summary has been discussed and given to patient:No      Current Labs: No labs drawn today.      Please refer to After Visit Summary or MyChart for upcoming appointment information. If you have any questions regarding your upcoming schedule please reach out to your care team through [employer] or call [phone].    Please notify your assigned Nurse Navigator of any unplanned hospital admissions or Emergency Room visits within 24 hours of discharge.    -------------------------------------------------------------------------------------------------------------------  Please call our office at [phone] if you have any  of the following symptoms:   Fever of 100.5 or greater  Chills  Shortness of breath  Swelling or pain in one leg    After office hours an answering service is available and will contact a provider for emergencies or if you are experiencing any of the above symptoms.        BK HARDEN RN

## 2025-04-09 NOTE — PROGRESS NOTES
Artificial Intelligence will be utilized during this visit to record, process the conversation to generate a clinical note, and support improvement of the AI technology. The patient (or guardian, if applicable) and other individuals in attendance at the appointment consented to the use of AI, including the recording.

## 2025-05-02 ENCOUNTER — HOSPITAL ENCOUNTER (OUTPATIENT)
Dept: MAMMOGRAPHY | Age: 75
Discharge: HOME | End: 2025-05-02
Attending: INTERNAL MEDICINE
Payer: MEDICARE

## 2025-05-02 DIAGNOSIS — Z12.31 ENCOUNTER FOR SCREENING MAMMOGRAM FOR MALIGNANT NEOPLASM OF BREAST: ICD-10-CM

## 2025-05-02 PROCEDURE — 77063 BREAST TOMOSYNTHESIS BI: CPT

## 2025-05-05 ENCOUNTER — RESULTS FOLLOW-UP (OUTPATIENT)
Dept: ONCOLOGY | Age: 75
End: 2025-05-05

## (undated) DEVICE — SOLUTION IV 1000ML 0.9% SOD CHL

## (undated) DEVICE — SPONGE LAP 18X18IN STRL -- 5/PK

## (undated) DEVICE — BUTTON SWITCH PENCIL BLADE ELECTRODE, HOLSTER: Brand: EDGE

## (undated) DEVICE — OCCLUSIVE GAUZE STRIP,3% BISMUTH TRIBROMOPHENATE IN PETROLATUM BLEND: Brand: XEROFORM

## (undated) DEVICE — SUT ETHLN 4-0 18IN P3 GRN --

## (undated) DEVICE — DRAPE TOWEL: Brand: CONVERTORS

## (undated) DEVICE — AMD ANTIMICROBIAL BANDAGE ROLL,6 PLY: Brand: KERLIX

## (undated) DEVICE — DRAPE SHT 3 QTR PROXIMA 53X77 --

## (undated) DEVICE — REM POLYHESIVE ADULT PATIENT RETURN ELECTRODE: Brand: VALLEYLAB

## (undated) DEVICE — SUT SLK 2-0SH 30IN BLK --

## (undated) DEVICE — SUTURE VCRL SZ 3-0 L18IN ABSRB UD PS-2 L19MM 1/2 CIR J497G

## (undated) DEVICE — Device

## (undated) DEVICE — NEEDLE SPNL 22GA L3.5IN BLK HUB S STL REG WALL FIT STYL W/

## (undated) DEVICE — SUTURE VCRL SZ 3-0 L18IN ABSRB UD PS-2 L19MM 3/8 CRV PRIM J497H

## (undated) DEVICE — COVER,MAYO STAND,STERILE: Brand: MEDLINE

## (undated) DEVICE — 3M™ TEGADERM™ TRANSPARENT FILM DRESSING FRAME STYLE, 1627, 4 IN X 10 IN (10 CM X 25 CM), 20/CT 4CT/CASE: Brand: 3M™ TEGADERM™

## (undated) DEVICE — SUTURE STRATAFIX SZ 3-0 L30CM NONABSORBABLE UD L19MM FS-2 SXMP2B408

## (undated) DEVICE — KENDALL SCD EXPRESS SLEEVES, KNEE LENGTH, MEDIUM: Brand: KENDALL SCD

## (undated) DEVICE — DRAPE SLUSH DISC W44XL66IN ST FOR RND BSIN HUSH SLUSH SYS

## (undated) DEVICE — TRAY PREP DRY W/ PREM GLV 2 APPL 6 SPNG 2 UNDPD 1 OVERWRAP

## (undated) DEVICE — SYR 10ML LUER LOK 1/5ML GRAD --

## (undated) DEVICE — SURGICAL PROCEDURE PACK BASIC ST FRANCIS

## (undated) DEVICE — AMD ANTIMICROBIAL GAUZE SPONGES,12 PLY USP TYPE VII, 0.2% POLYHEXAMETHYLENE BIGUANIDE HCI (PHMB): Brand: CURITY

## (undated) DEVICE — 2000CC GUARDIAN II: Brand: GUARDIAN

## (undated) DEVICE — NEEDLE HYPO 25GA L1.5IN BLU POLYPR HUB S STL REG BVL STR

## (undated) DEVICE — MASTISOL ADHESIVE LIQ 2/3ML

## (undated) DEVICE — DRAPE,CHEST,FENES,15X10,STERIL: Brand: MEDLINE

## (undated) DEVICE — TETRA-FLEX CF WOVEN LATEX FREE ELASTIC BANDAGE 6" X 11 YD: Brand: TETRA-FLEX™CF

## (undated) DEVICE — DRAPE TWL SURG 16X26IN BLU ORB04] ALLCARE INC]

## (undated) DEVICE — SUTURE PERMAHAND SZ 2-0 L18IN NONABSORBABLE BLK L26MM PS 1588H

## (undated) DEVICE — STOCKINETTE,IMPERVIOUS,12X48,STERILE: Brand: MEDLINE

## (undated) DEVICE — (D)PREP SKN CHLRAPRP APPL 26ML -- CONVERT TO ITEM 371833

## (undated) DEVICE — SUTURE STRATAFIX SPRL SZ 4-0 L5IN ABSRB UD FS-2 L19MM 3/8 SXMP2B407

## (undated) DEVICE — DRAPE IRRIG FLD WRM W44XL44IN W/ AORN STD PRTBL INTRATEMP

## (undated) DEVICE — KIT TISS EXP W/ 60ML SYR 122CM TRNSF SET PIERCING DEV L25CM

## (undated) DEVICE — ELECTRODE NDL 2.8IN COAT VALLEYLAB

## (undated) DEVICE — SUTURE VCRL SZ 2-0 L36IN ABSRB UD L36MM CT-1 1/2 CIR J945H

## (undated) DEVICE — DRAIN SURG W10MMXL20CM SIL FLAT HUBLESS W/ RADPQ STRP 3/4

## (undated) DEVICE — DRAPE,TOP,102X53,STERILE: Brand: MEDLINE

## (undated) DEVICE — SMALL BOWL SET-LF: Brand: MEDLINE INDUSTRIES, INC.

## (undated) DEVICE — SUTURE COAT VCRL SZ 4-0 L18IN ABSRB UD L19MM PS-2 1/2 CIR J496G

## (undated) DEVICE — SUTURE VCRL SZ 3-0 L27IN ABSRB UD L26MM SH 1/2 CIR J416H

## (undated) DEVICE — MICRODISSECTION NEEDLE STRAIGHT SLEEVE: Brand: COLORADO

## (undated) DEVICE — COVER US PRB W12XL244CM FLD IORT STR TIP

## (undated) DEVICE — SHEET, DRAPE, SPLIT, STERILE: Brand: MEDLINE

## (undated) DEVICE — SUTURE ABSRB X-1 REV CUT 1/2 CIR 22MM UD BRAID 27IN SZ 3-0 J458H

## (undated) DEVICE — GOWN,REINFORCED,POLY,AURORA,XXLARGE,STR: Brand: MEDLINE

## (undated) DEVICE — SLIM BODY SKIN STAPLER: Brand: APPOSE ULC

## (undated) DEVICE — BANDAGE COMPR SELF ADH 5 YDX4 IN TAN STRL PREMIERPRO LF

## (undated) DEVICE — UNIVERSAL DRAPES: Brand: MEDLINE INDUSTRIES, INC.